# Patient Record
Sex: FEMALE | Race: WHITE | HISPANIC OR LATINO | Employment: OTHER | URBAN - METROPOLITAN AREA
[De-identification: names, ages, dates, MRNs, and addresses within clinical notes are randomized per-mention and may not be internally consistent; named-entity substitution may affect disease eponyms.]

---

## 2022-07-29 ENCOUNTER — APPOINTMENT (INPATIENT)
Dept: MRI IMAGING | Facility: HOSPITAL | Age: 77
DRG: 074 | End: 2022-07-29
Payer: MEDICARE

## 2022-07-29 ENCOUNTER — HOSPITAL ENCOUNTER (INPATIENT)
Facility: HOSPITAL | Age: 77
LOS: 2 days | Discharge: HOME | DRG: 074 | End: 2022-07-31
Attending: EMERGENCY MEDICINE | Admitting: INTERNAL MEDICINE
Payer: MEDICARE

## 2022-07-29 ENCOUNTER — APPOINTMENT (EMERGENCY)
Dept: CT IMAGING | Facility: HOSPITAL | Age: 77
DRG: 074 | End: 2022-07-29
Payer: MEDICARE

## 2022-07-29 DIAGNOSIS — I67.1 INTRACRANIAL ANEURYSM: ICD-10-CM

## 2022-07-29 DIAGNOSIS — I63.9 STROKE (HCC): Primary | ICD-10-CM

## 2022-07-29 DIAGNOSIS — I16.0 HYPERTENSIVE URGENCY: ICD-10-CM

## 2022-07-29 DIAGNOSIS — R29.898 WEAKNESS OF RIGHT HAND: ICD-10-CM

## 2022-07-29 PROBLEM — R29.90 STROKE-LIKE SYMPTOMS: Status: ACTIVE | Noted: 2022-07-29

## 2022-07-29 PROBLEM — R03.0 ELEVATED BLOOD PRESSURE READING: Status: ACTIVE | Noted: 2022-07-29

## 2022-07-29 LAB
2HR DELTA HS TROPONIN: 0 NG/L
4HR DELTA HS TROPONIN: 2 NG/L
ANION GAP SERPL CALCULATED.3IONS-SCNC: 7 MMOL/L (ref 4–13)
APTT PPP: 26 SECONDS (ref 23–37)
BUN SERPL-MCNC: 18 MG/DL (ref 5–25)
CALCIUM SERPL-MCNC: 8.4 MG/DL (ref 8.4–10.2)
CARDIAC TROPONIN I PNL SERPL HS: 4 NG/L
CARDIAC TROPONIN I PNL SERPL HS: 4 NG/L
CARDIAC TROPONIN I PNL SERPL HS: 6 NG/L
CHLORIDE SERPL-SCNC: 105 MMOL/L (ref 96–108)
CO2 SERPL-SCNC: 29 MMOL/L (ref 21–32)
CREAT SERPL-MCNC: 0.94 MG/DL (ref 0.6–1.3)
ERYTHROCYTE [DISTWIDTH] IN BLOOD BY AUTOMATED COUNT: 16.4 % (ref 11.6–15.1)
GFR SERPL CREATININE-BSD FRML MDRD: 59 ML/MIN/1.73SQ M
GLUCOSE SERPL-MCNC: 115 MG/DL (ref 65–140)
GLUCOSE SERPL-MCNC: 87 MG/DL (ref 65–140)
HCT VFR BLD AUTO: 37.3 % (ref 34.8–46.1)
HGB BLD-MCNC: 11.8 G/DL (ref 11.5–15.4)
INR PPP: 0.92 (ref 0.84–1.19)
MCH RBC QN AUTO: 25 PG (ref 26.8–34.3)
MCHC RBC AUTO-ENTMCNC: 31.6 G/DL (ref 31.4–37.4)
MCV RBC AUTO: 79 FL (ref 82–98)
PLATELET # BLD AUTO: 341 THOUSANDS/UL (ref 149–390)
PLATELET # BLD AUTO: 360 THOUSANDS/UL (ref 149–390)
PMV BLD AUTO: 10 FL (ref 8.9–12.7)
PMV BLD AUTO: 10.3 FL (ref 8.9–12.7)
POTASSIUM SERPL-SCNC: 4.4 MMOL/L (ref 3.5–5.3)
PROTHROMBIN TIME: 12.6 SECONDS (ref 11.6–14.5)
RBC # BLD AUTO: 4.72 MILLION/UL (ref 3.81–5.12)
SODIUM SERPL-SCNC: 141 MMOL/L (ref 135–147)
WBC # BLD AUTO: 7.09 THOUSAND/UL (ref 4.31–10.16)

## 2022-07-29 PROCEDURE — 99285 EMERGENCY DEPT VISIT HI MDM: CPT

## 2022-07-29 PROCEDURE — 85027 COMPLETE CBC AUTOMATED: CPT

## 2022-07-29 PROCEDURE — 82948 REAGENT STRIP/BLOOD GLUCOSE: CPT

## 2022-07-29 PROCEDURE — 99223 1ST HOSP IP/OBS HIGH 75: CPT | Performed by: INTERNAL MEDICINE

## 2022-07-29 PROCEDURE — G1004 CDSM NDSC: HCPCS

## 2022-07-29 PROCEDURE — 70496 CT ANGIOGRAPHY HEAD: CPT

## 2022-07-29 PROCEDURE — 80048 BASIC METABOLIC PNL TOTAL CA: CPT

## 2022-07-29 PROCEDURE — 85049 AUTOMATED PLATELET COUNT: CPT | Performed by: INTERNAL MEDICINE

## 2022-07-29 PROCEDURE — 70498 CT ANGIOGRAPHY NECK: CPT

## 2022-07-29 PROCEDURE — 70551 MRI BRAIN STEM W/O DYE: CPT

## 2022-07-29 PROCEDURE — 93005 ELECTROCARDIOGRAM TRACING: CPT

## 2022-07-29 PROCEDURE — 36415 COLL VENOUS BLD VENIPUNCTURE: CPT

## 2022-07-29 PROCEDURE — 84484 ASSAY OF TROPONIN QUANT: CPT | Performed by: INTERNAL MEDICINE

## 2022-07-29 PROCEDURE — 84484 ASSAY OF TROPONIN QUANT: CPT

## 2022-07-29 PROCEDURE — 99285 EMERGENCY DEPT VISIT HI MDM: CPT | Performed by: EMERGENCY MEDICINE

## 2022-07-29 PROCEDURE — 85730 THROMBOPLASTIN TIME PARTIAL: CPT

## 2022-07-29 PROCEDURE — 85610 PROTHROMBIN TIME: CPT

## 2022-07-29 RX ORDER — CLOPIDOGREL BISULFATE 75 MG/1
300 TABLET ORAL ONCE
Status: COMPLETED | OUTPATIENT
Start: 2022-07-29 | End: 2022-07-29

## 2022-07-29 RX ORDER — ATORVASTATIN CALCIUM 40 MG/1
40 TABLET, FILM COATED ORAL EVERY EVENING
Status: DISCONTINUED | OUTPATIENT
Start: 2022-07-29 | End: 2022-07-30

## 2022-07-29 RX ORDER — HEPARIN SODIUM 5000 [USP'U]/ML
5000 INJECTION, SOLUTION INTRAVENOUS; SUBCUTANEOUS EVERY 8 HOURS SCHEDULED
Status: DISCONTINUED | OUTPATIENT
Start: 2022-07-29 | End: 2022-07-31 | Stop reason: HOSPADM

## 2022-07-29 RX ORDER — CLOPIDOGREL BISULFATE 75 MG/1
75 TABLET ORAL DAILY
Status: DISCONTINUED | OUTPATIENT
Start: 2022-07-30 | End: 2022-07-31 | Stop reason: HOSPADM

## 2022-07-29 RX ORDER — ASPIRIN 325 MG
325 TABLET ORAL ONCE
Status: COMPLETED | OUTPATIENT
Start: 2022-07-29 | End: 2022-07-29

## 2022-07-29 RX ADMIN — IOHEXOL 85 ML: 350 INJECTION, SOLUTION INTRAVENOUS at 17:17

## 2022-07-29 RX ADMIN — HEPARIN SODIUM 5000 UNITS: 5000 INJECTION INTRAVENOUS; SUBCUTANEOUS at 21:49

## 2022-07-29 RX ADMIN — ASPIRIN 325 MG ORAL TABLET 325 MG: 325 PILL ORAL at 17:46

## 2022-07-29 RX ADMIN — ATORVASTATIN CALCIUM 40 MG: 40 TABLET, FILM COATED ORAL at 21:49

## 2022-07-29 RX ADMIN — CLOPIDOGREL BISULFATE 300 MG: 75 TABLET ORAL at 17:46

## 2022-07-29 NOTE — ASSESSMENT & PLAN NOTE
Patient presented with right-sided numbness, weakness, gait imbalance  CT head negative for any acute infarction or bleeding  CTA head and neck negative for any large vessel occlusion, showed a 4 mm ICA aneurysm  Patient was stroke alert  Case discussed with Neurology  Patient be admitted for stroke pathway  MRI brain, echocardiogram, tele monitoring  Patient loaded with aspirin Plavix per Neurology recommendations  Continue aspirin 81 mg, Plavix 75 mg and statin  PT OT evaluation

## 2022-07-29 NOTE — ASSESSMENT & PLAN NOTE
4 mm right ICA aneurysm seen on CT head and neck  New for patient  Neurology recommending Neurosurgery evaluation  Will consult

## 2022-07-29 NOTE — ED PROVIDER NOTES
History  Chief Complaint   Patient presents with    CVA/TIA-like Symptoms     Pt presents to ED due to c/o tongue numbness, dizziness, sensation changes and gait dysfunction  Hx stroke, similar symptoms  Patient is a 82-year-old female with a history of hyperlipidemia who presents with gait disturbance and numbness  Patient was last well around 8:30 p m  Last evening  Her daughter states that patient lives with her and patient was normal prior to going to bed  When patient woke up this morning around 5:00 a m , she began complaining of unsteady gait  This continued throughout the day and patient's then told the daughter that she was having sensory deficits on the right side  She described numbness on the right face, upper extremity and lower extremity  She also complained of numbness of her tongue  Daughter did not note any speech difficulty  Patient does feel that she is leaning to the right when she is ambulating  She does have a history of TIA  No history of hypertension according to patient and daughter  History provided by:  Patient  Neurologic Problem  Quality:  Gait disturbance and numbness  Timing:  Constant  Progression:  Unchanged  Chronicity:  New  Ineffective treatments:  None tried  Associated symptoms: no abdominal pain, no chest pain, no cough, no diarrhea, no fever, no headaches, no nausea, no rash, no shortness of breath, no sore throat and no vomiting        None       No past medical history on file  No past surgical history on file  No family history on file  I have reviewed and agree with the history as documented  No existing history information found  No existing history information found  Review of Systems   Constitutional: Negative for chills, diaphoresis and fever  HENT: Negative for nosebleeds, sore throat and trouble swallowing  Eyes: Negative for photophobia, pain and visual disturbance     Respiratory: Negative for cough, chest tightness and shortness of breath  Cardiovascular: Negative for chest pain, palpitations and leg swelling  Gastrointestinal: Negative for abdominal pain, constipation, diarrhea, nausea and vomiting  Endocrine: Negative for polydipsia and polyuria  Genitourinary: Negative for difficulty urinating, dysuria, hematuria, pelvic pain, vaginal bleeding and vaginal discharge  Musculoskeletal: Positive for gait problem  Negative for back pain, neck pain and neck stiffness  Skin: Negative for pallor and rash  Neurological: Positive for numbness  Negative for dizziness, seizures, light-headedness and headaches  All other systems reviewed and are negative  Physical Exam  Physical Exam  Vitals and nursing note reviewed  Constitutional:       General: She is not in acute distress  Appearance: She is well-developed  HENT:      Head: Normocephalic and atraumatic  Eyes:      Pupils: Pupils are equal, round, and reactive to light  Cardiovascular:      Rate and Rhythm: Normal rate and regular rhythm  Pulses: Normal pulses  Heart sounds: Normal heart sounds  Pulmonary:      Effort: Pulmonary effort is normal  No respiratory distress  Breath sounds: Normal breath sounds  Abdominal:      General: There is no distension  Palpations: Abdomen is soft  Abdomen is not rigid  Tenderness: There is no abdominal tenderness  There is no guarding or rebound  Musculoskeletal:         General: No tenderness  Normal range of motion  Cervical back: Normal range of motion and neck supple  Lymphadenopathy:      Cervical: No cervical adenopathy  Skin:     General: Skin is warm and dry  Capillary Refill: Capillary refill takes less than 2 seconds  Neurological:      Mental Status: She is alert and oriented to person, place, and time  GCS: GCS eye subscore is 4  GCS verbal subscore is 5  GCS motor subscore is 6  Cranial Nerves: No cranial nerve deficit        Sensory: Sensory deficit (Decreased sensation to light touch in the right face, upper and lower extremities) present  Motor: Weakness (4+/5 strength in the right lower extremity  , 5/5 strength in all other muscle groups) present  Coordination: Coordination is intact  Vital Signs  ED Triage Vitals   Temperature Pulse Respirations Blood Pressure SpO2   07/29/22 1724 07/29/22 1653 07/29/22 1653 07/29/22 1653 07/29/22 1653   98 2 °F (36 8 °C) 81 16 (!) 237/200 99 %      Temp Source Heart Rate Source Patient Position - Orthostatic VS BP Location FiO2 (%)   07/29/22 1724 07/29/22 1653 07/29/22 1653 07/29/22 1653 --   Oral Monitor Sitting Left arm       Pain Score       --                  Vitals:    07/29/22 1653 07/29/22 1705 07/29/22 1715   BP: (!) 237/200 (!) 186/87 (!) 178/85   Pulse: 81 80 80   Patient Position - Orthostatic VS: Sitting           Visual Acuity  Visual Acuity    Flowsheet Row Most Recent Value   L Pupil Size (mm) 2   R Pupil Size (mm) 3          ED Medications  Medications   iohexol (OMNIPAQUE) 350 MG/ML injection (SINGLE-DOSE) 85 mL (85 mL Intravenous Given 7/29/22 1717)       Diagnostic Studies  Results Reviewed     Procedure Component Value Units Date/Time    CBC and Platelet [[id number]]  (Abnormal) Collected: 07/29/22 1711    Lab Status: Final result Specimen: Blood from Arm, Right Updated: 07/29/22 1725     WBC 7 09 Thousand/uL      RBC 4 72 Million/uL      Hemoglobin 11 8 g/dL      Hematocrit 37 3 %      MCV 79 fL      MCH 25 0 pg      MCHC 31 6 g/dL      RDW 16 4 %      Platelets 522 Thousands/uL      MPV 10 3 fL     Protime-INR [[id number]] Collected: 07/29/22 1711    Lab Status: In process Specimen: Blood from Arm, Right Updated: 07/29/22 1716    APTT [[id number]] Collected: 07/29/22 1711    Lab Status: In process Specimen: Blood from Arm, Right Updated: 07/29/22 1716    HS Troponin 0hr (reflex protocol) [[id number]] Collected: 07/29/22 1711    Lab Status:  In process Specimen: Blood from Arm, Right Updated: 07/29/22 5777    Basic metabolic panel [[id number]] Collected: 07/29/22 1711    Lab Status: In process Specimen: Blood from Arm, Right Updated: 07/29/22 1716    Fingerstick Glucose (POCT) [[id number]]  (Normal) Collected: 07/29/22 1702    Lab Status: Final result Updated: 07/29/22 1703     POC Glucose 115 mg/dl                  CT stroke alert brain    (Results Pending)   CTA stroke alert (head/neck)    (Results Pending)              Procedures  ECG 12 Lead Documentation Only    Date/Time: 7/29/2022 5:28 PM  Performed by: Guillermo Minor DO  Authorized by: Guillermo Minor DO     ECG reviewed by me, the ED Provider: yes    Patient location:  ED  Previous ECG:     Previous ECG:  Compared to current    Similarity:  No change    Comparison to cardiac monitor: Yes    Comments:      Normal sinus rhythm at a rate of 76 beats per minute  Normal intervals  Normal axis  Poor R-wave progression  Nonspecific ST T wave abnormalities  Similar to previous from 03/08/2015             ED Course  ED Course as of 07/29/22 1857 Fri Jul 29, 2022   1711 Spoke with Neurology, Dr Mercedes Oates, who will look at the images  If negative, will load with aspirin and Plavix  1743 No hemorrhage on CT  Will load with aspirin and Plavix per neurology recommendations                                             MDM  Number of Diagnoses or Management Options  Stroke [facility]): new and requires workup  Diagnosis management comments: Patient presents with unsteady gait and paresthesias  Symptoms concerning for stroke  Last known well is outside of the window for tPA  CTA does not reveal a thrombosis or other pathology amenable to endovascular intervention  Neurology recommends aspirin and Plavix  Will hospitalize for further workup of stroke      Portions of the above record have been created with voice recognition software   Occasional wrong word or "sound alike" substitutions may have occurred due to the inherent limitations of voice recognition software   Read the chart carefully and recognize, using context, where substitutions may have occurred  Amount and/or Complexity of Data Reviewed  Clinical lab tests: ordered and reviewed  Tests in the radiology section of CPT®: ordered and reviewed  Tests in the medicine section of CPT®: ordered and reviewed  Review and summarize past medical records: yes  Discuss the patient with other providers: yes  Independent visualization of images, tracings, or specimens: yes    Risk of Complications, Morbidity, and/or Mortality  Presenting problems: high  Diagnostic procedures: high  Management options: moderate    Patient Progress  Patient progress: stable      Disposition  Final diagnoses:   Stroke [facility])     Time reflects when diagnosis was documented in both MDM as applicable and the Disposition within this note     Time User Action Codes Description Comment    7/29/2022  5:09 PM Angela Murphy Add [I63 9] Stroke [facility])       ED Disposition     None      Follow-up Information    None         Patient's Medications    No medications on file       No discharge procedures on file      PDMP Review     None          ED Provider  Electronically Signed by           Rosalinda Cantrell DO  07/29/22 7536

## 2022-07-29 NOTE — QUICK NOTE
Stroke alert 5:05 pm  Neurology call back 5:05 pm  NIH 1, rt sided numbness  Pt out of iv tpa window  Last known normal last night  Ct head no hemorrhage or early evidence of infarct  cta head/neck no lvo  Pt has bilateral intracranial stenosis    htn urgency vs acute left thalamic cva  Given intracranial rt suprclinoid 4 mm aneurysm would recommend tighter bp control sbp 160-180   Initially bp 237/200 don't want to drop it too fast     Aspirin 300 mg load, plavix 325 mg load then 81 mg and plavix 75 mg daily for 3 weeks then aspirin only 81 mg daily  lipitor 80 mg daily  Tele  2-d echo  Mri brain w/o contrast  Stat head ct with any neuro change  Flp, tsh, hba1c, b12  neurosurgery consult for aneurysm

## 2022-07-29 NOTE — H&P
Eduardo 60 1945, 78 y o  female MRN: [id number]  Unit/Bed#: ED 07 Encounter: [id number]  Primary Care Provider: No primary care provider on file  Date and time admitted to hospital: 7/29/2022  4:50 PM    * Stroke-like symptoms  Assessment & Plan  Patient presented with right-sided numbness, weakness, gait imbalance  CT head negative for any acute infarction or bleeding  CTA head and neck negative for any large vessel occlusion, showed a 4 mm ICA aneurysm  Patient was stroke alert  Case discussed with Neurology  Patient be admitted for stroke pathway  MRI brain, echocardiogram, tele monitoring  Patient loaded with aspirin Plavix per Neurology recommendations  Continue aspirin 81 mg, Plavix 75 mg and statin  PT OT evaluation  Intracranial aneurysm  Assessment & Plan  4 mm right ICA aneurysm seen on CT head and neck  New for patient  Neurology recommending Neurosurgery evaluation  Will consult  Elevated blood pressure reading  Assessment & Plan  Patient blood pressure is significantly elevated on admission  Patient without any known history of hypertension  Blood pressure now significantly improved without any interventions  Continue monitor closely for now  VTE Prophylaxis: Heparin  / sequential compression device   Code Status: full  POLST: POLST form is not discussed and not completed at this time  Discussion with family: pt and daughter    Anticipated Length of Stay:  Patient will be admitted on an Inpatient basis with an anticipated length of stay of  > 2 midnights  Justification for Hospital Stay: above    Total Time for Visit, including Counseling / Coordination of Care: 60 minutes  Greater than 50% of this total time spent on direct patient counseling and coordination of care      Chief Complaint:   Right upper and lower extremity numbness, weakness, gait imbalance x since this morning    History of Present Illness:    Travis Medrano is a 78 y o  female who presents with Right upper and lower extremity numbness, weakness, gait imbalance x since this morning  Patient is primarily Indonesian-speaking  Accompanied by her daughter at bedside who helped with translation  Per daughter, patient mentioned to them about gait imbalance and the numbness this afternoon when they were at the mall  But per patient her symptoms possibly started around this morning and progressively worsened  She also complained of numbness on the right side of her tongue  Currently on my evaluation, patient reports slight improvement in her symptoms  But still feels weak, numb on the right extremities  Had headache previously that has now resolved  Daughter does report history of TIA  No other known medical problems  She is not on any chronic prescription medications  Not even aspirin  Review of Systems:    Review of Systems   Constitutional: Negative for chills, fatigue and fever  HENT: Negative for congestion and sore throat  Respiratory: Negative for cough and shortness of breath  Cardiovascular: Negative for chest pain and palpitations  Gastrointestinal: Negative for abdominal pain, nausea and vomiting  Genitourinary: Negative for difficulty urinating, dysuria, flank pain, frequency and urgency  Musculoskeletal: Positive for gait problem  Negative for arthralgias and myalgias  Skin: Negative for color change and rash  Neurological: Positive for weakness, numbness and headaches  Negative for dizziness, speech difficulty and light-headedness  Psychiatric/Behavioral: Negative for agitation, behavioral problems and confusion  Past Medical and Surgical History:     No past medical history on file  No past surgical history on file  Meds/Allergies:    Prior to Admission medications    Not on File     I have reviewed home medications with patient family member      Allergies: No Known Allergies    Social History:     Marital Status: Single   Occupation:   Patient Pre-hospital Living Situation:   Patient Pre-hospital Level of Mobility:   Patient Pre-hospital Diet Restrictions:   Substance Use History:   Social History     Substance and Sexual Activity   Alcohol Use Not on file     Social History     Tobacco Use   Smoking Status Not on file   Smokeless Tobacco Not on file     Social History     Substance and Sexual Activity   Drug Use Not on file       Family History:    No family history on file  Physical Exam:     Vitals:   Blood Pressure: 155/72 (07/29/22 1845)  Pulse: 76 (07/29/22 1845)  Temperature: 98 2 °F (36 8 °C) (07/29/22 1724)  Temp Source: Oral (07/29/22 1724)  Respirations: 18 (07/29/22 1845)  Weight - Scale: 80 6 kg (177 lb 11 1 oz) (07/29/22 1715)  SpO2: 97 % (07/29/22 1845)    Physical Exam    Constitutional: Pt appears comfortable  Not in any acute distress  HENT:   Head: Normocephalic and atraumatic  Eyes: EOM are normal    Neck: Neck supple  Cardiovascular: Normal rate, regular rhythm, normal heart sounds  No murmur heard  Pulmonary/Chest: Effort normal, air entry b/l equal  No respiratory distress  Pt has no wheezes or crackles  Abdominal: Soft  Non-distended, Non-tender  Bowel sounds are normal    Musculoskeletal: Normal range of motion  Neurological: awake, alert  Moving all extremities spontaneously  Right upper and lower extremity strength 5-/5  Numbness in right upper and lower extremity  Cranial nerves intact  Psychiatric: normal mood and affect  Additional Data:     Lab Results: I have personally reviewed pertinent reports        Results from last 7 days   Lab Units 07/29/22  1711   WBC Thousand/uL 7 09   HEMOGLOBIN g/dL 11 8   HEMATOCRIT % 37 3   PLATELETS Thousands/uL 360     Results from last 7 days   Lab Units 07/29/22  1711   SODIUM mmol/L 141   POTASSIUM mmol/L 4 4   CHLORIDE mmol/L 105   CO2 mmol/L 29   BUN mg/dL 18   CREATININE mg/dL 0 94   ANION GAP mmol/L 7 CALCIUM mg/dL 8 4   GLUCOSE RANDOM mg/dL 87     Results from last 7 days   Lab Units 07/29/22  1711   INR  0 92     Results from last 7 days   Lab Units 07/29/22  1702   POC GLUCOSE mg/dl 115               Imaging: I have personally reviewed pertinent reports  CTA stroke alert (head/neck)   Final Result by Toya Petersen MD (07/29 6104)      Moderate to severe plaque stenosis bilateral cavernous ICA carotid siphon  4 mm right supraclinoid ICA aneurysm  I personally discussed this study with neurologist  on 7/29/2022 at 5:28 PM                         Workstation performed: [id number]         CT stroke alert brain   Final Result by Toya Petersen MD (07/29 1726)      No acute intracranial abnormality  I personally discussed this study with neurologist  on 7/29/2022 at 5:28 PM                Workstation performed: [id number]         MRI Inpatient Order    (Results Pending)       EKG, Pathology, and Other Studies Reviewed on Admission:   · EKG:     Allscripts / Epic Records Reviewed: Yes     ** Please Note: This note has been constructed using a voice recognition system   **

## 2022-07-29 NOTE — ASSESSMENT & PLAN NOTE
Patient blood pressure is significantly elevated on admission  Patient without any known history of hypertension  Blood pressure now significantly improved without any interventions  Continue monitor closely for now

## 2022-07-30 ENCOUNTER — APPOINTMENT (INPATIENT)
Dept: NON INVASIVE DIAGNOSTICS | Facility: HOSPITAL | Age: 77
DRG: 074 | End: 2022-07-30
Payer: MEDICARE

## 2022-07-30 PROBLEM — E78.5 HYPERLIPIDEMIA: Status: ACTIVE | Noted: 2022-07-30

## 2022-07-30 PROBLEM — I16.0 HYPERTENSIVE URGENCY: Status: ACTIVE | Noted: 2022-07-29

## 2022-07-30 PROBLEM — R29.898 WEAKNESS OF RIGHT HAND: Status: ACTIVE | Noted: 2022-07-29

## 2022-07-30 LAB
ALBUMIN SERPL BCP-MCNC: 3.4 G/DL (ref 3.5–5)
ALP SERPL-CCNC: 69 U/L (ref 34–104)
ALT SERPL W P-5'-P-CCNC: 11 U/L (ref 7–52)
ANION GAP SERPL CALCULATED.3IONS-SCNC: 5 MMOL/L (ref 4–13)
AST SERPL W P-5'-P-CCNC: 17 U/L (ref 13–39)
ATRIAL RATE: 79 BPM
BILIRUB SERPL-MCNC: 0.44 MG/DL (ref 0.2–1)
BUN SERPL-MCNC: 13 MG/DL (ref 5–25)
CALCIUM ALBUM COR SERPL-MCNC: 8.4 MG/DL (ref 8.3–10.1)
CALCIUM SERPL-MCNC: 7.9 MG/DL (ref 8.4–10.2)
CHLORIDE SERPL-SCNC: 108 MMOL/L (ref 96–108)
CHOLEST SERPL-MCNC: 188 MG/DL
CHOLEST SERPL-MCNC: 188 MG/DL
CO2 SERPL-SCNC: 27 MMOL/L (ref 21–32)
CREAT SERPL-MCNC: 0.68 MG/DL (ref 0.6–1.3)
ERYTHROCYTE [DISTWIDTH] IN BLOOD BY AUTOMATED COUNT: 16.2 % (ref 11.6–15.1)
EST. AVERAGE GLUCOSE BLD GHB EST-MCNC: 114 MG/DL
GFR SERPL CREATININE-BSD FRML MDRD: 85 ML/MIN/1.73SQ M
GLUCOSE SERPL-MCNC: 84 MG/DL (ref 65–140)
HBA1C MFR BLD: 5.6 %
HCT VFR BLD AUTO: 35.2 % (ref 34.8–46.1)
HDLC SERPL-MCNC: 49 MG/DL
HDLC SERPL-MCNC: 49 MG/DL
HGB BLD-MCNC: 11.3 G/DL (ref 11.5–15.4)
LDLC SERPL CALC-MCNC: 124 MG/DL (ref 0–100)
LDLC SERPL CALC-MCNC: 124 MG/DL (ref 0–100)
MCH RBC QN AUTO: 24.9 PG (ref 26.8–34.3)
MCHC RBC AUTO-ENTMCNC: 32.1 G/DL (ref 31.4–37.4)
MCV RBC AUTO: 78 FL (ref 82–98)
NONHDLC SERPL-MCNC: 139 MG/DL
P AXIS: 51 DEGREES
PLATELET # BLD AUTO: 322 THOUSANDS/UL (ref 149–390)
PMV BLD AUTO: 9.8 FL (ref 8.9–12.7)
POTASSIUM SERPL-SCNC: 3.7 MMOL/L (ref 3.5–5.3)
PR INTERVAL: 152 MS
PROT SERPL-MCNC: 6.1 G/DL (ref 6.4–8.4)
QRS AXIS: 66 DEGREES
QRSD INTERVAL: 68 MS
QT INTERVAL: 360 MS
QTC INTERVAL: 405 MS
RBC # BLD AUTO: 4.53 MILLION/UL (ref 3.81–5.12)
SODIUM SERPL-SCNC: 140 MMOL/L (ref 135–147)
T WAVE AXIS: 56 DEGREES
TRIGL SERPL-MCNC: 74 MG/DL
TRIGL SERPL-MCNC: 74 MG/DL
TSH SERPL DL<=0.05 MIU/L-ACNC: 4.25 UIU/ML (ref 0.45–4.5)
VENTRICULAR RATE: 76 BPM
VIT B12 SERPL-MCNC: 2773 PG/ML (ref 100–900)
WBC # BLD AUTO: 5.34 THOUSAND/UL (ref 4.31–10.16)

## 2022-07-30 PROCEDURE — 97116 GAIT TRAINING THERAPY: CPT

## 2022-07-30 PROCEDURE — 80053 COMPREHEN METABOLIC PANEL: CPT | Performed by: INTERNAL MEDICINE

## 2022-07-30 PROCEDURE — 93306 TTE W/DOPPLER COMPLETE: CPT

## 2022-07-30 PROCEDURE — 97163 PT EVAL HIGH COMPLEX 45 MIN: CPT

## 2022-07-30 PROCEDURE — 80061 LIPID PANEL: CPT

## 2022-07-30 PROCEDURE — 80061 LIPID PANEL: CPT | Performed by: INTERNAL MEDICINE

## 2022-07-30 PROCEDURE — 99222 1ST HOSP IP/OBS MODERATE 55: CPT | Performed by: PSYCHIATRY & NEUROLOGY

## 2022-07-30 PROCEDURE — 93010 ELECTROCARDIOGRAM REPORT: CPT | Performed by: INTERNAL MEDICINE

## 2022-07-30 PROCEDURE — 84443 ASSAY THYROID STIM HORMONE: CPT

## 2022-07-30 PROCEDURE — 92610 EVALUATE SWALLOWING FUNCTION: CPT

## 2022-07-30 PROCEDURE — 97166 OT EVAL MOD COMPLEX 45 MIN: CPT

## 2022-07-30 PROCEDURE — NC001 PR NO CHARGE: Performed by: NURSE PRACTITIONER

## 2022-07-30 PROCEDURE — 82607 VITAMIN B-12: CPT

## 2022-07-30 PROCEDURE — 83036 HEMOGLOBIN GLYCOSYLATED A1C: CPT | Performed by: INTERNAL MEDICINE

## 2022-07-30 PROCEDURE — 99447 NTRPROF PH1/NTRNET/EHR 11-20: CPT | Performed by: NURSE PRACTITIONER

## 2022-07-30 PROCEDURE — 85027 COMPLETE CBC AUTOMATED: CPT | Performed by: INTERNAL MEDICINE

## 2022-07-30 PROCEDURE — 99233 SBSQ HOSP IP/OBS HIGH 50: CPT | Performed by: INTERNAL MEDICINE

## 2022-07-30 RX ORDER — ATORVASTATIN CALCIUM 40 MG/1
80 TABLET, FILM COATED ORAL EVERY EVENING
Status: DISCONTINUED | OUTPATIENT
Start: 2022-07-30 | End: 2022-07-31 | Stop reason: HOSPADM

## 2022-07-30 RX ORDER — ASPIRIN 81 MG/1
81 TABLET ORAL DAILY
Status: DISCONTINUED | OUTPATIENT
Start: 2022-07-30 | End: 2022-07-31 | Stop reason: HOSPADM

## 2022-07-30 RX ADMIN — HEPARIN SODIUM 5000 UNITS: 5000 INJECTION INTRAVENOUS; SUBCUTANEOUS at 14:13

## 2022-07-30 RX ADMIN — HEPARIN SODIUM 5000 UNITS: 5000 INJECTION INTRAVENOUS; SUBCUTANEOUS at 05:33

## 2022-07-30 RX ADMIN — ASPIRIN 81 MG: 81 TABLET, COATED ORAL at 08:42

## 2022-07-30 RX ADMIN — HEPARIN SODIUM 5000 UNITS: 5000 INJECTION INTRAVENOUS; SUBCUTANEOUS at 20:39

## 2022-07-30 RX ADMIN — CLOPIDOGREL BISULFATE 75 MG: 75 TABLET ORAL at 08:42

## 2022-07-30 RX ADMIN — DESMOPRESSIN ACETATE 80 MG: 0.2 TABLET ORAL at 18:16

## 2022-07-30 NOTE — CONSULTS
Consultation - Neurology   Kamilah Doshi 78 y o  female MRN: [id number]  Unit/Bed#: S -01 Encounter: [id number]      Assessment/Plan     * Weakness of right hand  Assessment & Plan   78 y o  right handed female with intracranial aneurysm, elevated BP, who presented to [facility] on 7/29/22 with R sided numbness  NIHSS 1 (sensation)  BP on arrival 163/76 but then 237/200  Last known well night of 7/28/22 around 8:30PM   Pt not a tPA candidate due to being outside of tPA time window  R thumb adduction weakness and decreased strength concerning for neuropathy in R hand versus arthritis (pain limitation)     Plan   - recommend outpatient RUE nerve conduction study    MRI brain without any evidence of acute infarct   Echo pending    Lipid Panel: total cholesterol 152   Hemoglobin A1c pending    TSH 4 246   Vitamin B12 pending    S/p aspirin 325 mg load and Plavix 300 mg load on 7/29/22   May resume antiplatelet regimen as was prior to admission due to no stroke, no TIA    Defer statin recommendations to primary team    Recommend goal normotension, management per primary team    Euglycemic, normothermic goal   PT/OT/ST eval and treat as appropriate   Frequent neuro checks  Continue to monitor and notify neurology with any changes   STAT CT head for any acute change in neuro exam  - Medical management and supportive care per primary team  Correction of any metabolic or infectious disturbances    - No further inpatient neurology recommendations at this time  Please reach out with any further questions or concerns  Intracranial aneurysm  Assessment & Plan  - recommend neurosurgical evaluation     Elevated blood pressure reading  Assessment & Plan  - recommend goal normotension   - management per primary team         Kamilah Doshi will need follow up in in 6 weeks with general attending or advance practitioner  She will require a EMG/NCS within 12 weeks       **History and physical examination obtained by attending neurologist  AP in room as witness to history and physical examination obtained and acted solely as a scribe for attending neurologist  This AP did not provide any decision making for this encounter  **      History of Present Illness     Reason for Consult / Principal Problem: Numbness   Hx and PE limited by: N/A; patient's daughter in room and able to supplement history and translate   HPI: Cortney Sterling is a 78 y o  right handed female with intracranial aneurysm, elevated BP, who presented to [facility] on 7/29/22 with R sided numbness  NIHSS 1 (sensation)  BP on arrival 163/76 but then 237/200  Last known well night of 7/28/22 around 8:30PM   Pt not a tPA candidate due to being outside of tPA time window  Per ED notes, pt's daughter reports pt woke up at 5 AM 7/29/22 and was complaining of unsteady gait, which continued throughout the day  Then, pt complained of R sided sensory deficits per ED notes (numbness in R face, RUE, RLE, tongue)  Today, pt notes some lower abdominal pain  Patient reports the lower abdominal pain started "after they injected me " Patient reports she gets up 5-7 times to urinate in the night  Pt's daughter reports she has been dropping things with her R hand and is not able to open and close her R hand as well as her L hand  Pt reports some itchiness and discomfort in R ear  Inpatient consult to Neurology  Consult performed by: Jennifer Junior PA-C  Consult ordered by: Zion Richard MD    Consult to Neurology  Consult performed by: Jennifer Junior PA-C  Consult ordered by: Zion Richard MD          Review of Systems   Constitutional: Negative for fever  HENT: Positive for ear pain  Gastrointestinal: Positive for abdominal pain  Negative for nausea  Genitourinary: Positive for frequency  Musculoskeletal: Positive for neck pain (some chronic "here and there" )     Neurological: Negative for facial asymmetry and speech difficulty  Historical Information   History reviewed  No pertinent past medical history  History reviewed  No pertinent surgical history  Social History   Social History     Substance and Sexual Activity   Alcohol Use None     Social History     Substance and Sexual Activity   Drug Use Not on file     E-Cigarette/Vaping     E-Cigarette/Vaping Substances     Social History     Tobacco Use   Smoking Status Not on file   Smokeless Tobacco Not on file     Family History: History reviewed  No pertinent family history  Review of previous medical records was  completed  Meds/Allergies   current meds:   Current Facility-Administered Medications   Medication Dose Route Frequency    aspirin (ECOTRIN LOW STRENGTH) EC tablet 81 mg  81 mg Oral Daily    atorvastatin (LIPITOR) tablet 80 mg  80 mg Oral QPM    clopidogrel (PLAVIX) tablet 75 mg  75 mg Oral Daily    heparin (porcine) subcutaneous injection 5,000 Units  5,000 Units Subcutaneous Q8H [address]    and PTA meds:   None       No Known Allergies    Objective   Vitals:Blood pressure (!) 177/97, pulse 80, temperature 97 5 °F (36 4 °C), resp  rate 18, weight 80 6 kg (177 lb 11 1 oz), SpO2 98 %  ,There is no height or weight on file to calculate BMI  No intake or output data in the 24 hours ending 07/30/22 1147    Invasive Devices: Invasive Devices  Report    Peripheral Intravenous Line  Duration           Peripheral IV 07/29/22 Right Antecubital <1 day                Physical Exam  Vitals and nursing note reviewed  Constitutional:       General: She is not in acute distress  Appearance: She is obese  She is not diaphoretic  HENT:      Head: Normocephalic and atraumatic  Nose: Nose normal  No congestion or rhinorrhea        Mouth/Throat:      Mouth: Mucous membranes are moist    Eyes:      Extraocular Movements: Extraocular movements intact and EOM normal       Conjunctiva/sclera: Conjunctivae normal       Pupils: Pupils are equal, round, and reactive to light  Cardiovascular:      Rate and Rhythm: Normal rate  Pulmonary:      Effort: Pulmonary effort is normal    Musculoskeletal:      Right lower leg: No edema  Left lower leg: No edema  Neurological:      Mental Status: She is alert  Coordination: Finger-Nose-Finger Test and Heel to [employer] normal    Psychiatric:      Comments: Pleasant and cooperative during exam         Neurologic Exam     Mental Status   Follows 1 step commands  Attention: appropriately attends to provider  Concentration: no redirection required during exam    Speech: (No dysarthria on exam  Speech fluent  )  Level of consciousness: alert  - oriented to place   - able to correctly state date of birth   - oriented to month and year      Cranial Nerves     CN II   Visual acuity: (grossly intact )  Right visual field deficit: none  Left visual field deficit: none     CN III, IV, VI   Pupils are equal, round, and reactive to light  Extraocular motions are normal    Right pupil: Size: 3 mm  Left pupil: Size: 3 mm  CN V   Facial sensation intact  Right facial sensation deficit: none  Left facial sensation deficit: none    CN VII   Facial expression full, symmetric     Right facial weakness: none  Left facial weakness: none    CN VIII   Hearing impaired: reports hearing slightly louder in R compared to L     CN XI   CN XI normal    Right trapezius strength: normal  Left trapezius strength: normal    Motor Exam   Muscle bulk: normal- R thumb adduction weaker and with decreased mobility compared to L thumb adduction   - R hand  4+/5  - L hand  5/5  - b/l wrist flexion 5/5   - b/l finger flexion 5/5  - b/l finger extension 5/5   - b/l elbow flexion and extension 5/5  - b/l hip flexion 5/5  - b/l knee flexion and extension 5/5   - b/l plantar flexion and dorsi flexion 5/5       Sensory Exam   Light touch normal    Right arm light touch: normal  Left arm light touch: normal  Right leg light touch: normal  Left leg light touch: normal  - Proprioception intact at b/l great toes  - feels temperature sensation greater on RUE compared to LUE      Gait, Coordination, and Reflexes     Gait  Gait: (deferred for patient safety )    Coordination   Finger to nose coordination: normal  Heel to shin coordination: normal    Reflexes   Right plantar reflex: downgoing   Left plantar reflex: downgoing Reflexes equal in b/l biceps and b/l patellars        Lab Results:   CBC:   Results from last 7 days   Lab Units 07/30/22  0613 07/29/22  2045 07/29/22  1711   WBC Thousand/uL 5 34  --  7 09   RBC Million/uL 4 53  --  4 72   HEMOGLOBIN g/dL 11 3*  --  11 8   HEMATOCRIT % 35 2  --  37 3   MCV fL 78*  --  79*   PLATELETS Thousands/uL 322 341 360   , BMP/CMP:   Results from last 7 days   Lab Units 07/30/22  0613 07/29/22  1711   SODIUM mmol/L 140 141   POTASSIUM mmol/L 3 7 4 4   CHLORIDE mmol/L 108 105   CO2 mmol/L 27 29   BUN mg/dL 13 18   CREATININE mg/dL 0 68 0 94   CALCIUM mg/dL 7 9* 8 4   AST U/L 17  --    ALT U/L 11  --    ALK PHOS U/L 69  --    EGFR ml/min/1 73sq m 85 59   , HgBA1C:   , TSH:   Results from last 7 days   Lab Units 07/30/22  0613   TSH 3RD GENERATON uIU/mL 4 246   , Lipid Profile:   Results from last 7 days   Lab Units 07/30/22  0613   HDL mg/dL 49*  49*   LDL CALC mg/dL 124*  124*   TRIGLYCERIDES mg/dL 74  74     Imaging Studies: I have personally reviewed pertinent reports  and I have personally reviewed pertinent films in PACS  MRI brain wo contrast 7/29/22, CTA H/N wwo contrast 7/29/22, CTH wo contrast 7/29/22  EKG, Pathology, and Other Studies: EKG 7/29/22  VTE Prophylaxis: Heparin    Code Status: Level 1 - Full Code  Advance Directive and Living Will:      Power of :    POLST:      Counseling / Coordination of Care  Please see time spent in attending attestation       **History and physical examination obtained by attending neurologist  AP in room as witness to history and physical examination obtained and acted solely as a scribe for attending neurologist This AP did not provide any decision making for this encounter   **

## 2022-07-30 NOTE — PHYSICAL THERAPY NOTE
PHYSICAL THERAPY EVALUATION NOTE          Patient Name: Orville Florentino  UOQYK'M Date: 2022      AGE:   78 y o  Mrn:   [id number]  ADMIT DX:  Intracranial aneurysm [I67 1]  Stroke [facility]) [I63 9]  Symptoms of cerebrovascular accident (CVA) [R09 89]    Past Medical History:  History reviewed  No pertinent past medical history  Past Surgical History:  History reviewed  No pertinent surgical history    Length Of Stay: 1        PHYSICAL THERAPY EVALUATION:    Patient's identity confirmed via 2 patient identifiers (full name and ) at start of session       22 1320   PT Last Visit   PT Visit Date 22   Note Type   Note type Evaluation   Pain Assessment   Pain Assessment Tool 0-10   Pain Score No Pain   Restrictions/Precautions   Weight Bearing Precautions Per Order No   Other Precautions Fall Risk  (Pt preferred language is Green[place]ic)   Home Living   Type of [address] [place] [address] Two level;1/2 bath on main level;Bed/bath upstairs  (0 LORENE through garage, 4 LORENE w/o railing through front entrance)   Bathroom Shower/Tub Tub/shower unit   Bathroom Toilet Standard   Additional Comments Pt currently residing w/ family in a two level house w/ 0 LORENE through garage, 1/2 bath on 1st floor w/ couch available if needed, and full flight of steps to 2nd floor bedroom and full bathroom   Prior Function   Level of [place] Independent with ADLs and functional mobility   Lives With Daughter;Family   Receives Help From Family   ADL Assistance Independent   IADLs Needs assistance  (family assists)   Falls in the last 6 months 0   Comments At baseline, pt ambulates independently w/o AD, performs ADLs independently, and has assistance w/ IADLs   General   Additional Pertinent History Pt is primarily [place]ic speaking, declined use of  w/ family present throughout   Family/Caregiver Present Yes   Cognition   Arousal/Participation Cooperative Orientation Level Oriented to person;Oriented to time   Following Commands Follows one step commands with increased time or repetition  (visual cues provided as needed)   Comments Pt ID via name and  on wristband; pt agreeable to PT eval and mobility   Strength RLE   R Knee Flexion 4/5   R Knee Extension 3+/5   R Ankle Dorsiflexion 3+/5   R Ankle Plantar Flexion 3+/5   Strength LLE   L Knee Flexion 4/5   L Knee Extension 4/5   L Ankle Dorsiflexion 4/5   L Ankle Plantar Flexion 4/5   Vision-Basic Assessment   Current Vision Wears glasses all the time   Light Touch   RLE Light Touch Impaired   RLE Light Touch Comments pt reports decreased overall sensation of RLE compared to LLE   LLE Light Touch Grossly intact   Bed Mobility   Supine to Sit Unable to assess   Additional items   (pt sitting OOB on couch upon arrival)   Sit to Supine Unable to assess   Additional items   (pt sitting OOB at end of session)   Transfers   Sit to Stand 5  Supervision   Additional items Assist x 1; Increased time required;Verbal cues   Stand to Sit 5  Supervision   Additional items Assist x 1; Increased time required;Verbal cues   Toilet transfer 5  Supervision   Additional items Assist x 1; Increased time required;Commode   Additional Comments VC for self-pacing w/ sit>stand mobility  Pt able to maintain standing balance at bathroom sink for approx 45-60 sec w/ supervision   Ambulation/Elevation   Gait pattern Improper Weight shift; Wide KEYLA; Decreased foot clearance; Short stride  (1-2 episodes of pt reaching for external assistance to maintain balance)   Gait Assistance 5  Supervision  (1-2 epsiodes of pt reaching for additional external assistance for balance/support)   Additional items Assist x 1;Verbal cues   Assistive Device None   Distance 25'+20'   Stair Management Assistance 5  Supervision   Additional items Assist x 1;Verbal cues  (PT providing LUE support (acting as railing))   Stair Management Technique Step to pattern   Number of Stairs 2  (declined further step trials)   Balance   Static Sitting Fair +   Dynamic Sitting Fair   Static Standing Fair -   Dynamic Standing [address] -   Activity Tolerance   Activity Tolerance Patient tolerated treatment well   Medical Staff Ismael velazquez w/ OT Rebecca Rivas due to pt's medical complexity, regression from mobility baseline, and cognitive/safety awareness deficits requiring two skilled clinicians; individual items assessed and goals addressed   Assessment   Prognosis Fair   Problem List Decreased strength; Impaired balance;Decreased mobility; Impaired sensation   Assessment Ronald Bhakta is a 78 y o  Female who [facility]20[facility] w/ c/o tongue numbness, dizziness, sensation changes, and gait dysfunction and diagnosis of weakness of R hand  Orders for PT eval and treat received, w/ activity orders of up w/ assist  Comorbidities affecting pt at time of evaluation include: h/o TIA  Personal factors affecting DC include: lives in 2 story house, inability to navigate community distances, preferred language not English (language barrier) and inability to perform IADLs  At baseline, pt mobilizes independently w/ no AD, and w/ 0 fall(s) in the previous 6 months  Upon evaluation, pt presents w/ the following deficits: weakness, altered sensation, impaired balance and gait deviations  Pt currently requires supervision for transfers, supervision for ambulation w/o AD (1-2 episodes of pt reaching for additional external assistance/support), and supervision w/ LUE support for stair negotiation  Pt's clinical presentation is unstable/unpredictable due to poor blood pressure control, need for input for mobility technique/safety, impaired balance, ongoing medical monitoring/management, and recent drastic decline in mobility compared to baseline  Pt is at an increased risk of falls due to impaired balance, decreased RLE sensation, decreased RLE strength   From a PT/mobility standpoint, given the above findings, DC recommendation is: Home w/ HHPT w/ family support/supervision  During current admission, pt will benefit from continued skilled inpatient PT in the acute care setting in order to address the above deficits and to maximize function and mobility prior to DC from acute care  Goals   STG Expiration Date 08/09/22   Short Term Goal #1 Pt will: perform bed mobility w/ mod I to decrease pt's burden of care and increase pt's independence w/ repositioning in bed; perform transfers w/ mod I to increase pt's OOB mobility; ambulate at least 350' w/ LARD and mod I to increase pt's ambulatory endurance/tolerance; negotiate 12 steps w/ UE support and supervision to facilitate pt returning to previous living environment; increase all balance ratings by at least 1 grade to decrease pt's risk of falls   PT Treatment Day 1  (PT tx note below)   Plan   Treatment/Interventions Functional transfer training;LE strengthening/ROM; Elevations; Therapeutic exercise; Endurance training;Patient/family training;Equipment eval/education; Bed mobility;Gait training   PT Frequency 3-5x/wk   Recommendation   PT Discharge Recommendation Home with outpatient rehabilitation   Equipment Recommended Walker  (pt used RW during PT tx below)   Carola Kelly walker   Change/add to [employer]?  Yes, Change Size   Walker Size Oscar (Ht <5'1")   AM-PAC Basic Mobility Inpatient   Turning in Bed Without Bedrails 3   Lying on Back to Sitting on Edge of Flat Bed 3   Moving Bed to Chair 3   Standing Up From Chair 3   Walk in Room 3   Climb 3-5 Stairs 3   Basic Mobility Inpatient Raw Score 18   Basic Mobility Standardized Score 41 05   Highest Level Of Mobility   JH-HLM Goal 6: Walk 10 steps or more   JH-HLM Achieved 7: Walk 25 feet or more   Additional Treatment Session   Start Time 1335   End Time 1346  (total time: 11 min)   Treatment Assessment At end of PT eval, pt OOB sitting in armchair and agreeable to further PT intervention  Pt and family educated on using RW during ambulation for improved ambulatory endurance and balance w/ pt agreeable to trial  Visual demonstration of gait and turn negotiation w/ RW provided to pt prior to additional mobility  Using RW, pt ambulated 61' w/ supervision and no evidence of LOB  Pt demonstrated ability to negotiate around obstacles w/in room w/o requiring assistance from PT  Pt performed sit>supine bed mobility w/ supervision  Compared to ambulating w/o AD, while using RW pt demonstrated improved ambulatory endurance and balance  Pt and family educated on continued use of RW during current admission and upon DC w/ family confirming understanding  Pt continues to be functioning below baseline level, and remains limited in functional mobility due to decreased RLE strength and impaired balance  Pt will continue to benefit from skilled PT intervention to promote pt's independence w/ functional mobility and progress towards set goals  Continue to recommend DC home with outpatient rehabilitation w/ family support/supervision when medically cleared  PT's family educated on 1st floor set-up as needed upon DC  Equipment Use RW   Additional Treatment Day 1   End of Consult   Patient Position at End of Consult Supine; All needs within reach;Bed/Chair alarm activated       The patient's AM-PAC Basic Mobility Inpatient Short Form Raw Score is 18  A Raw score of greater than 16 suggests the patient may benefit from discharge to home  Please also refer to the recommendation of the Physical Therapist for safe discharge planning      Pt will benefit from skilled inpatient PT during this admission in order to facilitate progress towards goals and to maximize functional independence [facility] w/ family support/supervision        Carolina Simms, PT, DPT  07/30/22

## 2022-07-30 NOTE — ASSESSMENT & PLAN NOTE
78 y o  right handed female with intracranial aneurysm, elevated BP, who presented to [facility] on 7/29/22 with R sided numbness  NIHSS 1 (sensation)  BP on arrival 163/76 but then 237/200  Last known well night of 7/28/22 around 8:30PM   Pt not a tPA candidate due to being outside of tPA time window  R thumb adduction weakness and decreased strength concerning for neuropathy in R hand versus arthritis (pain limitation)     Plan   - recommend outpatient RUE nerve conduction study    MRI brain without any evidence of acute infarct   Echo pending    Lipid Panel: total cholesterol 152   Hemoglobin A1c pending    TSH 4 246   Vitamin B12 pending    S/p aspirin 325 mg load and Plavix 300 mg load on 7/29/22   May resume antiplatelet regimen as was prior to admission due to no stroke, no TIA    Defer statin recommendations to primary team    Recommend goal normotension, management per primary team    Euglycemic, normothermic goal   PT/OT/ST eval and treat as appropriate   Frequent neuro checks  Continue to monitor and notify neurology with any changes   STAT CT head for any acute change in neuro exam  - Medical management and supportive care per primary team  Correction of any metabolic or infectious disturbances    - No further inpatient neurology recommendations at this time  Please reach out with any further questions or concerns

## 2022-07-30 NOTE — SPEECH THERAPY NOTE
Speech-Language Pathology Bedside Swallow Evaluation        Patient Name: Reanna BUTCHER Date: 7/30/2022     Problem List  Patient Active Problem List   Diagnosis    Weakness of right hand    Elevated blood pressure reading    Intracranial aneurysm       Past Medical History  History reviewed  No pertinent past medical history  Past Surgical History  History reviewed  No pertinent surgical history  Current Medical Status  Pt is a 78 y o  female who presented to [address] [place] [address] with numbness and gait disturbance  BP on arrival 163/76 but then 237/200  Last known well night of 7/28/22 around 8:30PM   Pt not a tPA candidate due to being outside of tPA time window  Per ED notes, pt's daughter reports pt woke up at 5 AM 7/29/22 and was complaining of unsteady gait, which continued throughout the day  Then, pt complained of R sided sensory deficits per ED notes (numbness in R face, RUE, RLE, tongue)  Stroke pathway was initiatied  CTA revealed 4mm ICA aneurysm and neurosurgery evaluation was ordered  MRI negative for acute infarction but did show small chronic left basal ganglia lacunar infarction  Upon my arrival patient and RN deny any speech/language changes  She passed her RN dysphagia evaluation and has been reportedly tolerating her current diet  She reports her lingual numbness has resolved but reported new c/o odynophagia c/b globus sensation when she swallows which started approx 1 month ago  She also reports intermittent changes in her voice c/b hoarse vocal quality which also started approx 1 month ago  The patient/daughter also report occasional coughing with thin liquids  They deny any recent weight loss or PNA  Past medical history:   Please see H&P for details    Special Studies:  7/29 MRI Brain: 1  No acute infarction, intracranial hemorrhage or mass effect   2   Mild, chronic microangiopathy is similar to the previous study as is a small chronic left basal ganglia lacunar infarction  7/29 CTA head/neck: Moderate to severe plaque stenosis bilateral cavernous ICA carotid siphon  4 mm right supraclinoid ICA aneurysm  7/29 CT Brain: No acute intracranial abnormality  Swallow Information   Current Risks for Dysphagia & Aspiration: stroke pathway     Current Symptoms/Concerns: stroke pathway, globus sensation    Current Diet: regular diet and thin liquids      Baseline Diet: regular diet and thin liquids      Baseline Assessment   Behavior/Cognition: alert    Speech/Language Status: able to participate in conversation and able to follow commands Patient is Latvian speaking however her daughter is present to translate throughout my visit    Patient Positioning: upright at edge of couch      Swallow Mechanism Exam   Facial: symmetrical  Labial: WFL  Lingual: WFL  Velum: symmetrical  Mandible: adequate ROM  Dentition: adequate  Vocal quality:mildly hoarse   Volitional Cough: strong/productive   Resp: RA    Consistencies Assessed and Performance   Consistencies Administered: thin liquids and hard solids  Specific materials administered included: pulled pork sandwich, salad, thin liquids     Oral Stage:   Mastication was adequate with the materials administered today  Bolus formation and transfer were functional with nosignificant oral residue noted  No overt s/s reduced oral control  Pharyngeal Stage:   Swallowing initiation appeared prompt  Laryngeal rise was palpated and judged to be within functional limits  No coughing, throat clearing, change in vocal quality or respiratory status noted today despite rapid rate/vol of intake with both solids and liquids today  Esophageal Concerns: globus sensation, belching and reflux    Summary   Pts oropharyngeal swallow function appears generally WFL at this time with the materials administered today  s/s suggestive of esophageal dysphagia   Suspect laryngopharyngeal reflux which is likely contributing to her odynophagia and hoarse vocal quality  The daughter reports that when she comes to [place] her diet is different and her sxs increase  Additionally she reports she was previously on a "reflux medication" but was recently taken off of it in [facility]  I cannot fully r/o a pharyngeal dysphagia given the chronic infarct on the MRI ( started approx 1 month ago?) however this is less likely given her reported sxs, history and presentation today  Education was provided in detail to the patient and family on results of my assessment and recommendations including diet, safe swallowing strategies, aspiration/reflux precautions such as foods/drinks to avoid, positioning, etc  Reciprocal comprehension was verbally expressed       Recommendations: regular diet and thin liquids     Recommended Form of Meds: as tolerated ( daughter reported patient with c/o heartburn with multiple pills at once- consider one at a time)    Aspiration precautions and compensatory swallowing strategies: upright posture, slow rate of feeding, small bites/sips and smaller more frequent meals as well as use of added moisteners and avoiding foods/drinks that will increase reflux    Results Reviewed with: patient and family     Dysphagia Goals: pt will tolerate regular textures with thin liquids without s/s of aspiration x2-3    Plan  Will f/u 1-2x      MARYANN Kan , CCC-SLP  Speech Language Pathologist   Available via [address] [place] [address] #[id number]  [place] #[id number]

## 2022-07-30 NOTE — ASSESSMENT & PLAN NOTE
Patient presented with right-sided numbness, weakness, gait imbalance  Last known well night of 7/28/22 around 8:30PM   CT head negative for any acute infarction or bleeding  CTA head and neck negative for any large vessel occlusion, showed a 4 mm ICA aneurysm  MRI brain did not show any acute findings  Mild, chronic microangiopathy is similar to the previous study as is a small chronic left basal ganglia lacunar infarction  Patient was stroke alert  Case discussed with Neurology  Patient be admitted for stroke pathway  Patient loaded with aspirin Plavix per Neurology recommendations  Neurology consult and recommendation appreciated  Lipid Panel: total cholesterol 152  Hemoglobin A1c 5 6  TSH 4 246  Vitamin B12 pending    Plans:  Continue aspirin 81 mg, Plavix 75 mg and atorvastatin 80 mg daily  Follow-up PT OT evaluation  Follow-up speech and swallowing eval   Follow-up echocardiogram, and tele monitoring  Neurology recommends outpatient RUE nerve conduction study  Frequent neuro checks     STAT CT head for any acute change in neuro exam

## 2022-07-30 NOTE — TELEMEDICINE
On-Call Telephone Note      Jenni Fuentes 78 y o  female MRN: [id number]  Unit/Bed#: S -01 Encounter: [id number]    Per provider report, patient presented to the ED with right-sided weakness  NIH score 1  Patient's blood pressure was elevated  Last known well was night of 07/28/2022  Patient was not a tPA candidate due to outside of tPA window  During workup an incidental 4 mm right supraclinoid ICA aneurysm was seen  Available past medical history,social history, surgical history, medication list, drug allergies and review of systems were reviewed  BP (!) 177/97   Pulse 80   Temp 97 5 °F (36 4 °C)   Resp 18   Wt 80 6 kg (177 lb 11 1 oz)   SpO2 98%      Clinical exam per chart review, GCS 15, right  weakness 4+/5 otherwise strength 5/5 throughout    Imaging personally reviewed  And reviewed with Dr Genaro Sinclair  Incidental 4 mm right supraclinoid ICA aneurysm seen    Assessment and Plan  1  Continue neurological checks  2  Recommend SBP<160  3  No neurosurgical intervention warranted  4  This is likely an incidental finding, sent message to office patient will follow-up in 4 weeks as a snpx with Dr Genaro Sinclair  5  No need for transfer  6  STAT CT head with any neurological decline including drop GCS of 2pts within 1 hr   7  Medical management and pain control per primary team  8  Recommend discussing modifible risk factors with patient to include smoking , hypertension, and hyperlipidemia  Recommend close outpatient follow-up for blood pressure  9  Neurosurgery will sign off, patient will follow-up in outpatient setting in 4 weeks for further workup and evaluation of incidental aneurysms    All questions answered  Provider is in agreement with the course of action  A total of 15 minutes was spent discussing the presentation, physical exam and formulating a management plan with the provider

## 2022-07-30 NOTE — PLAN OF CARE
Problem: PHYSICAL THERAPY ADULT  Goal: Performs mobility at highest level of function for planned discharge setting  See evaluation for individualized goals  Description: Treatment/Interventions: Functional transfer training, LE strengthening/ROM, Elevations, Therapeutic exercise, Endurance training, Patient/family training, Equipment eval/education, Bed mobility, Gait training  Equipment Recommended: Misha Medrano (pt used RW during PT tx below)       See flowsheet documentation for full assessment, interventions and recommendations  7/30/2022 1554 by Trent Cabrera PT  Note: Prognosis: Fair  Problem List: Decreased strength, Impaired balance, Decreased mobility, Impaired sensation  Assessment: Karina Colon is a 78 y o  Female who [facility] w/ c/o tongue numbness, dizziness, sensation changes, and gait dysfunction and diagnosis of weakness of R hand  Orders for PT eval and treat received, w/ activity orders of up w/ assist  Comorbidities affecting pt at time of evaluation include: h/o TIA  Personal factors affecting DC include: lives in 2 story house, inability to navigate community distances, preferred language not English (language barrier) and inability to perform IADLs  At baseline, pt mobilizes independently w/ no AD, and w/ 0 fall(s) in the previous 6 months  Upon evaluation, pt presents w/ the following deficits: weakness, altered sensation, impaired balance and gait deviations  Pt currently requires supervision for transfers, supervision for ambulation w/o AD (1-2 episodes of pt reaching for additional external assistance/support), and supervision w/ LUE support for stair negotiation  Pt's clinical presentation is unstable/unpredictable due to poor blood pressure control, need for input for mobility technique/safety, impaired balance, ongoing medical monitoring/management, and recent drastic decline in mobility compared to baseline   Pt is at an increased risk of falls due to impaired balance, decreased RLE sensation, decreased RLE strength  From a PT/mobility standpoint, given the above findings, DC recommendation is: Home w/ HHPT w/ family support/supervision  During current admission, pt will benefit from continued skilled inpatient PT in the acute care setting in order to address the above deficits and to maximize function and mobility prior to DC from acute care  PT Discharge Recommendation: Home with outpatient rehabilitation    See flowsheet documentation for full assessment

## 2022-07-30 NOTE — OCCUPATIONAL THERAPY NOTE
Occupational Therapy Evaluation     Patient Name: August WADE Date: 7/30/2022  Problem List  Principal Problem:    Weakness of right hand  Active Problems:    Hypertensive urgency    Intracranial aneurysm    Hyperlipidemia    Past Medical History  History reviewed  No pertinent past medical history  Past Surgical History  History reviewed  No pertinent surgical history  07/30/22 4775   Note Type   Note type Evaluation   Restrictions/Precautions   Weight Bearing Precautions Per Order No   Other Precautions Chair Alarm; Bed Alarm; Fall Risk  (K[place] Speaking)   Pain Assessment   Pain Assessment Tool 0-10   Pain Score No Pain   Home Living   Type of Home House  (4 LORENE front and 0 LORENE garage)   Home Layout Two level;1/2 bath on main level   Bathroom Shower/Tub Tub/shower unit   Bathroom Toilet Standard   Additional Comments Patient ambulates w/o AD   Prior Function   Level of [place] Independent with ADLs and functional mobility   Lives With Daughter   Amos   IADLs Independent   Falls in the last 6 months 0   ADL   Eating Assistance 7  Independent   Grooming Assistance 5  Supervision/Setup   Grooming Deficit Wash/dry hands  (standing)   UB Bathing Assistance 5  Supervision/Setup   UB Bathing Deficit   (standing)   LB Bathing Assistance Unable to assess   UB Dressing Assistance 5  Supervision/Setup   LB Dressing Assistance 5  Supervision/Setup   LB Dressing Deficit Don/doff R sock; Don/doff L sock   Toileting Assistance  5  Supervision/Setup   Bed Mobility   Supine to Sit Unable to assess  (was reveived sitting on  couch)   Sit to Supine 5  Supervision   Transfers   Sit to Stand 5  Supervision   Additional items Increased time required   Stand to Sit 5  Supervision   Additional items Increased time required   Toilet transfer 5  Supervision   Functional Mobility   Additional Comments Patient ambulated w/o AD at [facility] and with RW at [facility] Static Sitting Good   Dynamic Sitting Good   Static Standing Fair   Dynamic Standing Fair   Activity Tolerance   Activity Tolerance Patient tolerated treatment well   Nurse Made Aware RN   RUE Assessment   RUE Assessment   (patient demonstarted weakness with hand strength and ability to pronate and supinate equally )   RUE Strength   R Forearm Pronation 3+/5   R Forearm Supination 3+/5   RUE Overall Strength   (Noted 3+ for finger adduction and abduction and mass grasp)   LUE Assessment   LUE Assessment WFL   Hand Function   Gross Motor Coordination Functional   Fine Motor Coordination Functional   Sensation   Light Touch   (patient states sensation difference in hand and forearm when copared to LT)   Vision-Basic Assessment   Current Vision Wears glasses all the time   Vision - Complex Assessment   Acuity Able to read clock/calendar on wall without difficulty   Cognition   Overall Cognitive Status [facility]   Arousal/Participation Alert; Cooperative   Orientation Level Oriented X4   Following Commands Follows one step commands with increased time or repetition   Comments Pt ID by wristband name and    Assessment   Limitation Decreased ADL status; Decreased UE strength;Decreased Safe judgement during ADL;Decreased endurance;Decreased self-care trans;Decreased high-level ADLs; Decreased fine motor control   Prognosis Fair   Assessment Patient evaluated by Occupational Therapy  Patient admitted with Weakness of right hand  CT head negative for any acute infarction or bleeding  CTA head and neck negative for any large vessel occlusion, showed a 4 mm ICA aneurysm  The patients occupational profile, medical and therapy history includes a expanded review of medical and/or therapy records and additional review of physical, cognitive, or psychosocial history related to current functional performance  Comorbidities affecting functional mobility and ADLS include: hypertension and hyperlipidemia    Prior to admission, patient was independent with functional mobility without assistive device, independent with ADLS, independent with IADLS and living with daughter in a 2 level home with 4 steps to enter  PatientThe evaluation identifies the following performance deficits: weakness, impaired balance, decreased endurance, decreased coordination, increased fall risk, new onset of impairment of functional mobility, decreased ADLS, decreased IADLS and decreased strength, that result in activity limitations and/or participation restrictions  Patient performed groom and Ub bath sup, UB dressing, LB dressing and Toileting sup  Transfers at sup and functional ambulation Min a w/o Ad CGA with RW  This evaluation requires clinical decision making of moderate complexity, because the patient may present with comorbidities that affect occupational performance and required minimal or moderate modification of tasks or assistance with the consideration of several treatment options  The Barthel Index was used as a functional outcome tool presenting with a score of Barthel Index Score: 75,The patient's raw score on the -PAC Daily Activity inpatient short form is 19, standardized score is 40 22, greater than 39 4  Patients at this level are likely to benefit from DC to home with outpatient OT to address hand weakness  Please refer to the recommendation of the Occupational Therapist for safe DC planning  Patient will benefit from skilled Occupational Therapy services to address above deficits and facilitate a safe return to prior level of function     Goals   Patient Goals "go home"   LTG Time Frame   (8-14)   Long Term Goal #1 Goals established to promote Patient Goals: "go home":  Patient will increase standing tolerance to 10 minutes during ADL task to decrease assistance level and decrease fall risk;Patient will tolerate 10 minutes of UE ROM/strengthening to increase general activity tolerance and performance in ADLS/IADLS; Patient will improve functional activity tolerance to 10 minutes of sustained functional tasks to increase participation in basic self-care and decrease assistance level;   Patient will increase dynamic standing balance to good to improve postural stability and decrease fall risk during standing ADLS and transfers  Functional Transfer Goals   Pt Will Transfer To Bedside Commode With mod indep   Pt Will Transfer To Toilet With mod indep   Pt Will Transfer To Shower With mod indep   ADL Goals   Pt Will Perform Eating Independently   Pt Will Perform Grooming With mod indep   Pt Will Perform Bathing With mod indep   Pt Will Perform UE Dressing With mod indep   Pt Will Perform LE Dressing With mod indep   Pt Will Perform Toileting With mod indep   Plan   Treatment Interventions ADL retraining;Functional transfer training;Patient/family training;Neuromuscular reeducation; Fine motor coordination activities;Continued evaluation   Goal Expiration Date 08/13/22   OT Frequency 2-3x/wk   Recommendation   OT Discharge Recommendation Home with outpatient rehabilitation   AM-PAC Daily Activity Inpatient   Lower Body Dressing 3   Bathing 3   Toileting 3   Upper Body Dressing 3   Grooming 3   Eating 4   Daily Activity Raw Score 19   Daily Activity Standardized Score (Calc for Raw Score >=11) 40 22   AM-PAC Applied Cognition Inpatient   Following a Speech/Presentation 4   Understanding Ordinary Conversation 4   Taking Medications 3   Remembering Where Things Are Placed or Put Away 4   Remembering List of 4-5 Errands 3   Taking Care of Complicated Tasks 3   Applied Cognition Raw Score 21   Applied Cognition Standardized Score 44 3   Barthel Index   Feeding 10   Bathing 5   Grooming Score 0   Dressing Score 5   Bladder Score 10   Bowels Score 10   Toilet Use Score 10   Transfers (Bed/Chair) Score 10   Mobility (Level Surface) Score 10   Stairs Score 5   Barthel Index Score 75   Lynette Bosch, OT

## 2022-07-30 NOTE — PLAN OF CARE
Recommendations: regular diet and thin liquids     Recommended Form of Meds: as tolerated ( daughter reported patient with c/o heartburn with multiple pills at once- consider one at a time)    Aspiration precautions and compensatory swallowing strategies: upright posture, slow rate of feeding, small bites/sips and smaller more frequent meals as well as use of added moisteners and avoiding foods/drinks that will increase reflux    Dysphagia Goals: pt will tolerate regular textures with thin liquids without s/s of aspiration x2-3

## 2022-07-30 NOTE — ASSESSMENT & PLAN NOTE
Lipid panel with total cholesterol 152, , HDL 49  No home medication  Patient had received a dose of Lipitor 40 mg at ED  Neurology recommended to continue Lipitor 80 mg daily  Defers statin? Recommends lifestyle modification with diet and exercise

## 2022-07-30 NOTE — ASSESSMENT & PLAN NOTE
4 mm right ICA aneurysm seen on CT head and neck  New for patient  Neurology and Neurosurgery evaluation consults and recommendation appreciated  Plans:  Continue neurological checks  Keep SBP<160  No neurosurgical intervention per Neurosurgery  This is likely an incidental finding, sent message to office patient will follow-up in 4 weeks as a snpx with Dr Umair Garcia  STAT CT head with any neurological decline including drop GCS of 2pts within 1 hr   Counseling on smoking , hypertension, and hyperlipidemia  Recommend close outpatient follow-up for blood pressure    Follow-up neurosurgery in outpatient setting in 4 weeks for further workup and evaluation of incidental aneurysms

## 2022-07-30 NOTE — PLAN OF CARE
Problem: OCCUPATIONAL THERAPY ADULT  Goal: Performs self-care activities at highest level of function for planned discharge setting  See evaluation for individualized goals  Description: Treatment Interventions: ADL retraining, Functional transfer training, Patient/family training, Neuromuscular reeducation, Fine motor coordination activities, Continued evaluation          See flowsheet documentation for full assessment, interventions and recommendations  Note: Limitation: Decreased ADL status, Decreased UE strength, Decreased Safe judgement during ADL, Decreased endurance, Decreased self-care trans, Decreased high-level ADLs, Decreased fine motor control  Prognosis: Fair  Assessment: Patient evaluated by Occupational Therapy  Patient admitted with Weakness of right hand  CT head negative for any acute infarction or bleeding  CTA head and neck negative for any large vessel occlusion, showed a 4 mm ICA aneurysm  The patients occupational profile, medical and therapy history includes a expanded review of medical and/or therapy records and additional review of physical, cognitive, or psychosocial history related to current functional performance  Comorbidities affecting functional mobility and ADLS include: hypertension and hyperlipidemia  Prior to admission, patient was independent with functional mobility without assistive device, independent with ADLS, independent with IADLS and living with daughter in a 2 level home with 4 steps to enter  PatientThe evaluation identifies the following performance deficits: weakness, impaired balance, decreased endurance, decreased coordination, increased fall risk, new onset of impairment of functional mobility, decreased ADLS, decreased IADLS and decreased strength, that result in activity limitations and/or participation restrictions  Patient performed groom and Ub bath sup, UB dressing, LB dressing and Toileting sup   Transfers at sup and functional ambulation Min a w/o Ad CGA with RW  This evaluation requires clinical decision making of moderate complexity, because the patient may present with comorbidities that affect occupational performance and required minimal or moderate modification of tasks or assistance with the consideration of several treatment options  The Barthel Index was used as a functional outcome tool presenting with a score of Barthel Index Score: 75,The patient's raw score on the -PAC Daily Activity inpatient short form is 19, standardized score is 40 22, greater than 39 4  Patients at this level are likely to benefit from DC to home with outpatient OT to address hand weakness  Please refer to the recommendation of the Occupational Therapist for safe DC planning  Patient will benefit from skilled Occupational Therapy services to address above deficits and facilitate a safe return to prior level of function       OT Discharge Recommendation: Home with outpatient rehabilitation

## 2022-07-30 NOTE — PROGRESS NOTES
[facility]  Progress Note Mickey Fothergill 1945, 78 y o  female MRN: [id number]  Unit/Bed#: S -[facility]amar Encounter: [id number]  Primary Care Provider: No primary care provider on file  Date and time admitted to hospital: 7/29/2022  4:50 PM    * Weakness of right hand  Assessment & Plan  Patient presented with right-sided numbness, weakness, gait imbalance  Last known well night of 7/28/22 around 8:30PM   CT head negative for any acute infarction or bleeding  CTA head and neck negative for any large vessel occlusion, showed a 4 mm ICA aneurysm  MRI brain did not show any acute findings  Mild, chronic microangiopathy is similar to the previous study as is a small chronic left basal ganglia lacunar infarction  Patient was stroke alert  Case discussed with Neurology  Patient be admitted for stroke pathway  Patient loaded with aspirin Plavix per Neurology recommendations  Neurology consult and recommendation appreciated  Lipid Panel: total cholesterol 152  Hemoglobin A1c 5 6  TSH 4 246  Vitamin B12 pending    Plans:  Continue aspirin 81 mg, Plavix 75 mg and atorvastatin 80 mg daily  Follow-up PT OT evaluation  Follow-up speech and swallowing eval   Follow-up echocardiogram, and tele monitoring  Neurology recommends outpatient RUE nerve conduction study  Frequent neuro checks  STAT CT head for any acute change in neuro exam        Hyperlipidemia  Assessment & Plan  Lipid panel with total cholesterol 152, , HDL 49  No home medication  Patient had received a dose of Lipitor 40 mg at ED  Neurology recommended to continue Lipitor 80 mg daily  Defers statin? Recommends lifestyle modification with diet and exercise  Intracranial aneurysm  Assessment & Plan  4 mm right ICA aneurysm seen on CT head and neck  New for patient  Neurology and Neurosurgery evaluation consults and recommendation appreciated      Plans:  Continue neurological checks  Keep SBP<160  No neurosurgical intervention per Neurosurgery  This is likely an incidental finding, sent message to office patient will follow-up in 4 weeks as a snpx with Dr Yarely Griffin  STAT CT head with any neurological decline including drop GCS of 2pts within 1 hr   Counseling on smoking , hypertension, and hyperlipidemia  Recommend close outpatient follow-up for blood pressure  Follow-up neurosurgery in outpatient setting in 4 weeks for further workup and evaluation of incidental aneurysms    Hypertensive urgency  Assessment & Plan  Patient blood pressure is significantly elevated on admission  Patient without any known history of hypertension  Blood pressure now significantly improved without any interventions  Continue monitor closely for now  VTE Pharmacologic Prophylaxis: VTE Score: 3 Moderate Risk (Score 3-4) - Pharmacological DVT Prophylaxis Ordered: heparin  Patient Centered Rounds: I performed bedside rounds with nursing staff today  Discussions with Specialists or Other Care Team Provider:  Neurology, Neurosurgery    Education and Discussions with Family / Patient: Updated  (daughter) at bedside  Patient's daughter is at bedside during rounds  Current Length of Stay: 1 day(s)  Current Patient Status: Inpatient   Discharge Plan: Anticipate discharge in 24-48 hrs to home  Code Status: Level 1 - Full Code    Subjective:   Patient seen at bedside  Communicated via  on phone this morning  Patient reports that her symptoms are improved  She feels better  Denied new neurological symptoms  No significant overnight event  Discussed imaging findings and treatment plans for today  Patient wants me to call to her daughter      Objective:     Vitals:   Temp (24hrs), Av 9 °F (36 6 °C), Min:97 5 °F (36 4 °C), Max:98 2 °F (36 8 °C)    Temp:  [97 5 °F (36 4 °C)-98 2 °F (36 8 °C)] 97 5 °F (36 4 °C)  HR:  [65-83] 80  Resp:  [16-18] 18  BP: (143-237)/() 177/97  SpO2:  [96 %-100 %] 98 %  There is no height or weight on file to calculate BMI  Input and Output Summary (last 24 hours):   No intake or output data in the 24 hours ending 07/30/22 1336    Physical Exam:   Physical Exam  Vitals and nursing note reviewed  Constitutional:       General: She is not in acute distress  Appearance: Normal appearance  She is well-developed  HENT:      Head: Normocephalic and atraumatic  Mouth/Throat:      Mouth: Mucous membranes are moist       Pharynx: Oropharynx is clear  Eyes:      Extraocular Movements: Extraocular movements intact  Conjunctiva/sclera: Conjunctivae normal       Pupils: Pupils are equal, round, and reactive to light  Cardiovascular:      Rate and Rhythm: Normal rate and regular rhythm  Heart sounds: Normal heart sounds  No murmur heard  No gallop  Pulmonary:      Effort: Pulmonary effort is normal  No respiratory distress  Breath sounds: Normal breath sounds  No wheezing or rales  Abdominal:      General: Bowel sounds are normal  There is no distension  Palpations: Abdomen is soft  Tenderness: There is no abdominal tenderness  Musculoskeletal:         General: No swelling  Cervical back: Neck supple  Right lower leg: No edema  Left lower leg: No edema  Skin:     General: Skin is warm and dry  Capillary Refill: Capillary refill takes less than 2 seconds  Coloration: Skin is not jaundiced or pale  Findings: No bruising or lesion  Neurological:      Mental Status: She is alert and oriented to person, place, and time  Cranial Nerves: No cranial nerve deficit  Motor: Weakness present  Comments: Slight weakness on right upper extremity  Motor 4/5 on right UE, 5/5 on left UE  Limited range of motion of right leg due to pain at right hip            Additional Data:     Labs:  Results from last 7 days   Lab Units 07/30/22  0613   WBC Thousand/uL 5 34   HEMOGLOBIN g/dL 11 3*   HEMATOCRIT % 35 2   PLATELETS Thousands/uL 322     Results from last 7 days   Lab Units 07/30/22  0613   SODIUM mmol/L 140   POTASSIUM mmol/L 3 7   CHLORIDE mmol/L 108   CO2 mmol/L 27   BUN mg/dL 13   CREATININE mg/dL 0 68   ANION GAP mmol/L 5   CALCIUM mg/dL 7 9*   ALBUMIN g/dL 3 4*   TOTAL BILIRUBIN mg/dL 0 44   ALK PHOS U/L 69   ALT U/L 11   AST U/L 17   GLUCOSE RANDOM mg/dL 84     Results from last 7 days   Lab Units 07/29/22  1711   INR  0 92     Results from last 7 days   Lab Units 07/29/22  1702   POC GLUCOSE mg/dl 115     Results from last 7 days   Lab Units 07/30/22  0613   HEMOGLOBIN A1C % 5 6           Lines/Drains:  Invasive Devices  Report    Peripheral Intravenous Line  Duration           Peripheral IV 07/29/22 Right Antecubital <1 day                  Telemetry:  Telemetry Orders (From admission, onward)             48 Hour Telemetry Monitoring  Continuous x 48 hours        References:    Telemetry Guidelines   Question:  Reason for 48 Hour Telemetry  Answer:  Acute CVA (<24 hrs old, hemispheric strokes, selected brainstem strokes, cardiac arrhythmias)                 Telemetry Reviewed: Normal Sinus Rhythm  Indication for Continued Telemetry Use: Acute CVA right-sided weakness    Imaging: Reviewed radiology reports from this admission including: MRI brain  MRI brain wo contrast   Final Result by Meghan Burciaga MD (07/30 0830)         1  No acute infarction, intracranial hemorrhage or mass effect  2   Mild, chronic microangiopathy is similar to the previous study as is a small chronic left basal ganglia lacunar infarction  Workstation performed: [id number]         CTA stroke alert (head/neck)   Final Result by Danette Goncalves MD (07/29 5684)      Moderate to severe plaque stenosis bilateral cavernous ICA carotid siphon  4 mm right supraclinoid ICA aneurysm              I personally discussed this study with neurologist  on 7/29/2022 at 5:28 PM  Workstation performed: [id number]         CT stroke alert brain   Final Result by Sena Maxwell MD (07/29 1728)      No acute intracranial abnormality  I personally discussed this study with neurologist  on 7/29/2022 at 5:28 PM                Workstation performed: [id number]             Recent Cultures (last 7 days):         Last 24 Hours Medication List:   Current Facility-Administered Medications   Medication Dose Route Frequency Provider Last Rate    aspirin  81 mg Oral Daily May Lili Mckeon MD      atorvastatin  80 mg Oral QPM May Lili Mckeon MD      clopidogrel  75 mg Oral Daily Justa Almaraz MD      heparin (porcine)  5,000 Units Subcutaneous [facility] 62 Justa Almaraz MD          Today, Patient Was Seen By: Maeve Mckeon MD    **Please Note: This note may have been constructed using a voice recognition system  **

## 2022-07-31 VITALS
OXYGEN SATURATION: 97 % | RESPIRATION RATE: 18 BRPM | BODY MASS INDEX: 35.68 KG/M2 | HEART RATE: 83 BPM | WEIGHT: 177 LBS | HEIGHT: 59 IN | DIASTOLIC BLOOD PRESSURE: 78 MMHG | TEMPERATURE: 98.2 F | SYSTOLIC BLOOD PRESSURE: 141 MMHG

## 2022-07-31 LAB
ANION GAP SERPL CALCULATED.3IONS-SCNC: 7 MMOL/L (ref 4–13)
AORTIC ROOT: 3.2 CM
APICAL FOUR CHAMBER EJECTION FRACTION: 63 %
AV REGURGITATION PRESSURE HALF TIME: 473 MS
BUN SERPL-MCNC: 12 MG/DL (ref 5–25)
CALCIUM SERPL-MCNC: 7.9 MG/DL (ref 8.4–10.2)
CHLORIDE SERPL-SCNC: 110 MMOL/L (ref 96–108)
CO2 SERPL-SCNC: 24 MMOL/L (ref 21–32)
CREAT SERPL-MCNC: 0.63 MG/DL (ref 0.6–1.3)
E WAVE DECELERATION TIME: 220 MS
ERYTHROCYTE [DISTWIDTH] IN BLOOD BY AUTOMATED COUNT: 16.3 % (ref 11.6–15.1)
FRACTIONAL SHORTENING: 41 % (ref 28–44)
GFR SERPL CREATININE-BSD FRML MDRD: 88 ML/MIN/1.73SQ M
GLUCOSE SERPL-MCNC: 82 MG/DL (ref 65–140)
HCT VFR BLD AUTO: 36.4 % (ref 34.8–46.1)
HGB BLD-MCNC: 11.7 G/DL (ref 11.5–15.4)
INTERVENTRICULAR SEPTUM IN DIASTOLE (PARASTERNAL SHORT AXIS VIEW): 1.1 CM
INTERVENTRICULAR SEPTUM: 1.1 CM (ref 0.6–1.1)
LAAS-AP2: 20.4 CM2
LAAS-AP4: 21.6 CM2
LEFT ATRIUM SIZE: 4 CM
LEFT INTERNAL DIMENSION IN SYSTOLE: 2.3 CM (ref 2.1–4)
LEFT VENTRICULAR INTERNAL DIMENSION IN DIASTOLE: 3.9 CM (ref 3.5–6)
LEFT VENTRICULAR POSTERIOR WALL IN END DIASTOLE: 1.1 CM
LEFT VENTRICULAR STROKE VOLUME: 48 ML
LVSV (TEICH): 48 ML
MCH RBC QN AUTO: 25.1 PG (ref 26.8–34.3)
MCHC RBC AUTO-ENTMCNC: 32.1 G/DL (ref 31.4–37.4)
MCV RBC AUTO: 78 FL (ref 82–98)
MV E'TISSUE VEL-SEP: 4 CM/S
MV PEAK A VEL: 0.95 M/S
MV PEAK E VEL: 51 CM/S
MV STENOSIS PRESSURE HALF TIME: 64 MS
MV VALVE AREA P 1/2 METHOD: 3.44 CM2
PLATELET # BLD AUTO: 314 THOUSANDS/UL (ref 149–390)
PMV BLD AUTO: 10.1 FL (ref 8.9–12.7)
POTASSIUM SERPL-SCNC: 3.8 MMOL/L (ref 3.5–5.3)
RBC # BLD AUTO: 4.67 MILLION/UL (ref 3.81–5.12)
RIGHT ATRIUM AREA SYSTOLE A4C: 12.5 CM2
RIGHT VENTRICLE ID DIMENSION: 3.3 CM
SL CV AV DECELERATION TIME RETROGRADE: 1630 MS
SL CV AV PEAK GRADIENT RETROGRADE: 91 MMHG
SL CV LEFT ATRIUM LENGTH A2C: 5.6 CM
SL CV LV EF: 65
SL CV PED ECHO LEFT VENTRICLE DIASTOLIC VOLUME (MOD BIPLANE) 2D: 67 ML
SL CV PED ECHO LEFT VENTRICLE SYSTOLIC VOLUME (MOD BIPLANE) 2D: 19 ML
SODIUM SERPL-SCNC: 141 MMOL/L (ref 135–147)
TR MAX PG: 22 MMHG
TR PEAK VELOCITY: 2.3 M/S
TRICUSPID VALVE PEAK REGURGITATION VELOCITY: 2.33 M/S
WBC # BLD AUTO: 5.86 THOUSAND/UL (ref 4.31–10.16)

## 2022-07-31 PROCEDURE — 93306 TTE W/DOPPLER COMPLETE: CPT | Performed by: INTERNAL MEDICINE

## 2022-07-31 PROCEDURE — 85027 COMPLETE CBC AUTOMATED: CPT

## 2022-07-31 PROCEDURE — 99239 HOSP IP/OBS DSCHRG MGMT >30: CPT | Performed by: INTERNAL MEDICINE

## 2022-07-31 PROCEDURE — 80048 BASIC METABOLIC PNL TOTAL CA: CPT

## 2022-07-31 RX ORDER — ATORVASTATIN CALCIUM 80 MG/1
40 TABLET, FILM COATED ORAL EVERY EVENING
Qty: 30 TABLET | Refills: 0 | Status: SHIPPED | OUTPATIENT
Start: 2022-07-31 | End: 2022-08-02 | Stop reason: SDUPTHER

## 2022-07-31 RX ORDER — AMLODIPINE BESYLATE 5 MG/1
5 TABLET ORAL DAILY
Qty: 30 TABLET | Refills: 0 | Status: SHIPPED | OUTPATIENT
Start: 2022-08-01 | End: 2022-08-02 | Stop reason: SDUPTHER

## 2022-07-31 RX ORDER — ASPIRIN 81 MG/1
81 TABLET ORAL DAILY
Qty: 30 TABLET | Refills: 0 | Status: SHIPPED | OUTPATIENT
Start: 2022-08-01 | End: 2022-08-02 | Stop reason: SDUPTHER

## 2022-07-31 RX ORDER — AMLODIPINE BESYLATE 5 MG/1
5 TABLET ORAL DAILY
Status: DISCONTINUED | OUTPATIENT
Start: 2022-07-31 | End: 2022-07-31 | Stop reason: HOSPADM

## 2022-07-31 RX ADMIN — AMLODIPINE BESYLATE 5 MG: 5 TABLET ORAL at 11:49

## 2022-07-31 RX ADMIN — ASPIRIN 81 MG: 81 TABLET, COATED ORAL at 08:27

## 2022-07-31 RX ADMIN — CLOPIDOGREL BISULFATE 75 MG: 75 TABLET ORAL at 08:27

## 2022-07-31 NOTE — INCIDENTAL FINDINGS
The following findings require follow up:  Radiographic finding   Findin mm right ICA aneurysm seen on CT head and neck  Follow up required: yes   Follow up should be done within 4 week(s) Follow-up neurosurgery in outpatient setting in 4 weeks for further workup and evaluation of incidental aneurysms    Please notify the following clinician to assist with the follow up: Your PCP

## 2022-07-31 NOTE — PROGRESS NOTES
[facility]  Progress Note Russell Salcedo 1945, 78 y o  female MRN: [id number]  Unit/Bed#: S -[facility] Encounter: [id number]  Primary Care Provider: No primary care provider on file  Date and time admitted to hospital: 7/29/2022  4:50 PM    * Weakness of right hand  Assessment & Plan  Patient presented with right-sided numbness, weakness, gait imbalance  Last known well night of 7/28/22 around 8:30PM   CT head negative for any acute infarction or bleeding  CTA head and neck negative for any large vessel occlusion, showed a 4 mm ICA aneurysm  MRI brain did not show any acute findings  Mild, chronic microangiopathy is similar to the previous study as is a small chronic left basal ganglia lacunar infarction  Patient was stroke alert  Case discussed with Neurology  Patient be admitted for stroke pathway  Patient loaded with aspirin Plavix per Neurology recommendations  Neurology consult and recommendation appreciated  Lipid Panel: total cholesterol 152  Hemoglobin A1c 5 6  TSH 4 246  Vitamin B12 2773    Plans:  Continue aspirin 81 mg,and atorvastatin 80 mg daily  Frequent neuro checks  Outpatient EMG nerve conduction test       Hypertensive urgency  Assessment & Plan  Patient blood pressure was significantly elevated on admission  237/200  This morning BP is 157/93  Plan:  5 mg p o  Amlodipine has been added  Continue  Reassess response  On discharge, patient will continue aspirin 81 mg daily and atorvastatin 80 mg daily  Patient will need to establish care with a PCP for ongoing blood pressure management        Intracranial aneurysm  Assessment & Plan  4 mm right ICA aneurysm seen on CT head and neck  New for patient  Neurology and Neurosurgery evaluation consults and recommendation appreciated  Plans:  Continue neurological check  Counseling on smoking , hypertension, and hyperlipidemia      Recommend close outpatient follow-up for blood pressure  Follow-up neurosurgery in outpatient setting in 4 weeks for further workup and evaluation of incidental aneurysms    Hyperlipidemia  Assessment & Plan  Lipid panel with total cholesterol 152, , HDL 49  No home medication  Patient had received a dose of Lipitor 40 mg at ED  Since admission patient has been on 80 mg atorvastatin daily/    Plan  Continue the 80 mg of atorvastatin  Encouraged lifestyle modifications        VTE Pharmacologic Prophylaxis: VTE Score: 3 Moderate Risk (Score 3-4) - Pharmacological DVT Prophylaxis Ordered: heparin  Patient Centered Rounds: I performed bedside rounds with nursing staff today  Discussions with Specialists or Other Care Team Provider: Neurosurgery and neurology    Education and Discussions with Family / Patient: Updated  (daughter) via phone  Current Length of Stay: 2 day(s)  Current Patient Status: Inpatient   Discharge Plan: Anticipate discharge in 48 hrs to home with home services  Code Status: Level 1 - Full Code    Subjective:   Patient is oriented and alert  Patient is Maltese speaking only  Daughter was able to translate  Rupper and right lower extremity weakness is improving  Denies any chest pain, SOB n/v diarrhea  Objective:     Vitals:   Temp (24hrs), Av 9 °F (36 6 °C), Min:97 6 °F (36 4 °C), Max:98 2 °F (36 8 °C)    Temp:  [97 6 °F (36 4 °C)-98 2 °F (36 8 °C)] 98 2 °F (36 8 °C)  HR:  [71-87] 73  Resp:  [16-18] 18  BP: (147-168)/(86-93) 157/93  SpO2:  [96 %-97 %] 97 %  Body mass index is 35 75 kg/m²  Input and Output Summary (last 24 hours):   No intake or output data in the 24 hours ending 22 1150    Physical Exam:   Physical Exam  Vitals and nursing note reviewed  Constitutional:       General: She is not in acute distress  Appearance: She is well-developed  HENT:      Head: Normocephalic and atraumatic     Eyes:      Conjunctiva/sclera: Conjunctivae normal    Cardiovascular:      Rate and Rhythm: Normal rate and regular rhythm  Heart sounds: No murmur heard  Pulmonary:      Effort: Pulmonary effort is normal  No respiratory distress  Breath sounds: Normal breath sounds  Abdominal:      Palpations: Abdomen is soft  Tenderness: There is no abdominal tenderness  Musculoskeletal:      Cervical back: Neck supple  Skin:     General: Skin is warm and dry  Neurological:      Mental Status: She is alert  Additional Data:     Labs:  Results from last 7 days   Lab Units 07/31/22  0444   WBC Thousand/uL 5 86   HEMOGLOBIN g/dL 11 7   HEMATOCRIT % 36 4   PLATELETS Thousands/uL 314     Results from last 7 days   Lab Units 07/31/22  0444 07/30/22  0613   SODIUM mmol/L 141 140   POTASSIUM mmol/L 3 8 3 7   CHLORIDE mmol/L 110* 108   CO2 mmol/L 24 27   BUN mg/dL 12 13   CREATININE mg/dL 0 63 0 68   ANION GAP mmol/L 7 5   CALCIUM mg/dL 7 9* 7 9*   ALBUMIN g/dL  --  3 4*   TOTAL BILIRUBIN mg/dL  --  0 44   ALK PHOS U/L  --  69   ALT U/L  --  11   AST U/L  --  17   GLUCOSE RANDOM mg/dL 82 84     Results from last 7 days   Lab Units 07/29/22  1711   INR  0 92     Results from last 7 days   Lab Units 07/29/22  1702   POC GLUCOSE mg/dl 115     Results from last 7 days   Lab Units 07/30/22  0613   HEMOGLOBIN A1C % 5 6           Lines/Drains:  Invasive Devices  Report    Peripheral Intravenous Line  Duration           Peripheral IV 07/29/22 Right Antecubital 1 day                  Telemetry:  Telemetry Orders (From admission, onward)             48 Hour Telemetry Monitoring  Continuous x 48 hours        Expiring   References:    Telemetry Guidelines   Question:  Reason for 48 Hour Telemetry  Answer:  Acute CVA (<24 hrs old, hemispheric strokes, selected brainstem strokes, cardiac arrhythmias)                 Telemetry Reviewed: Normal Sinus Rhythm  Indication for Continued Telemetry Use: No indication for continued use  Will discontinue  Imaging: No pertinent imaging reviewed      Recent Cultures (last 7 days):         Last 24 Hours Medication List:   Current Facility-Administered Medications   Medication Dose Route Frequency Provider Last Rate    amLODIPine  5 mg Oral Daily Rhianna Angel MD      aspirin  81 mg Oral Daily May Thu Cortez Morales MD      atorvastatin  80 mg Oral QPM May Thu MD Evert      clopidogrel  75 mg Oral Daily Zion Richard MD      heparin (porcine)  5,000 Units Subcutaneous [facility] Zion Richard MD          Today, Patient Was Seen By: Ana Isabel MD    **Please Note: This note may have been constructed using a voice recognition system  **

## 2022-07-31 NOTE — ASSESSMENT & PLAN NOTE
4 mm right ICA aneurysm seen on CT head and neck  New for patient  Neurology and Neurosurgery evaluation consults and recommendation appreciated  Plans:  Continue neurological check  Counseling on smoking , hypertension, and hyperlipidemia  Recommend close outpatient follow-up for blood pressure    Follow-up neurosurgery in outpatient setting in 4 weeks for further workup and evaluation of incidental aneurysms

## 2022-07-31 NOTE — PLAN OF CARE
Problem: Neurological Deficit  Goal: Neurological status is stable or improving  Description: Interventions:  - Monitor and assess patient's level of consciousness, motor function, sensory function, and level of assistance needed for ADLs  - Monitor and report changes from baseline  Collaborate with interdisciplinary team to initiate plan and implement interventions as ordered  - Provide and maintain a safe environment  - Consider seizure precautions  - Consider fall precautions  - Consider aspiration precautions  - Consider bleeding precautions  7/31/2022 1532 by Yoni Tierney RN  Outcome: Adequate for Discharge  7/31/2022 1022 by Yoni Tierney RN  Outcome: Progressing     Problem: Activity Intolerance/Impaired Mobility  Goal: Mobility/activity is maintained at optimum level for patient  Description: Interventions:  - Assess and monitor patient  barriers to mobility and need for assistive/adaptive devices  - Assess patient's emotional response to limitations  - Collaborate with interdisciplinary team and initiate plans and interventions as ordered  - Encourage independent activity per ability   - Maintain proper body alignment  - Perform active/passive rom as tolerated/ordered  - Plan activities to conserve energy   - Turn patient as appropriate  7/31/2022 1532 by Yoni Tierney RN  Outcome: Adequate for Discharge  7/31/2022 1022 by Yoni Tierney RN  Outcome: Progressing     Problem: Communication Impairment  Goal: Ability to express needs and understand communication  Description: Assess patient's communication skills and ability to understand information  Patient will demonstrate use of effective communication techniques, alternative methods of communication and understanding even if not able to speak  - Encourage communication and provide alternate methods of communication as needed  - Collaborate with case management/ for discharge needs    - Include patient/family/caregiver in decisions related to communication  7/31/2022 1532 by Edson Gracia RN  Outcome: Adequate for Discharge  7/31/2022 1022 by Edson Gracia RN  Outcome: Progressing     Problem: Potential for Aspiration  Goal: Non-ventilated patient's risk of aspiration is minimized  Description: Assess and monitor vital signs, respiratory status, and labs (WBC)  Monitor for signs of aspiration (tachypnea, cough, rales, wheezing, cyanosis, fever)  - Assess and monitor patient's ability to swallow  - Place patient up in chair to eat if possible  - HOB up at 90 degrees to eat if unable to get patient up into chair   - Supervise patient during oral intake  - Instruct patient/ family to take small bites  - Instruct patient/ family to take small single sips when taking liquids  - Follow patient-specific strategies generated by speech pathologist   7/31/2022 1532 by Edson Gracia RN  Outcome: Adequate for Discharge  7/31/2022 1022 by Edson Gracia RN  Outcome: Progressing  Goal: Ventilated patient's risk of aspiration is minimized  Description: Assess and monitor vital signs, respiratory status, airway cuff pressure, and labs (WBC)  Monitor for signs of aspiration (tachypnea, cough, rales, wheezing, cyanosis, fever)  - Elevate head of bed 30 degrees if patient has tube feeding   - Monitor tube feeding  7/31/2022 1532 by Edson Gracia RN  Outcome: Adequate for Discharge  7/31/2022 1022 by Edson Gracia RN  Outcome: Progressing     Problem: Nutrition  Goal: Nutrition/Hydration status is improving  Description: Monitor and assess patient's nutrition/hydration status for malnutrition (ex- brittle hair, bruises, dry skin, pale skin and conjunctiva, muscle wasting, smooth red tongue, and disorientation)  Collaborate with interdisciplinary team and initiate plan and interventions as ordered  Monitor patient's weight and dietary intake as ordered or per policy   Utilize nutrition screening tool and intervene per policy  Determine patient's food preferences and provide high-protein, high-caloric foods as appropriate  - Assist patient with eating   - Allow adequate time for meals   - Encourage patient to take dietary supplement as ordered  - Collaborate with clinical nutritionist   - Include patient/family/caregiver in decisions related to nutrition    7/31/2022 1532 by Angie Akers, RN  Outcome: Adequate for Discharge  7/31/2022 1022 by Angie Akers RN  Outcome: Progressing

## 2022-07-31 NOTE — ASSESSMENT & PLAN NOTE
Patient presented with right-sided numbness, weakness, gait imbalance  Last known well night of 7/28/22 around 8:30PM   CT head negative for any acute infarction or bleeding  CTA head and neck negative for any large vessel occlusion, showed a 4 mm ICA aneurysm  MRI brain did not show any acute findings  Mild, chronic microangiopathy is similar to the previous study as is a small chronic left basal ganglia lacunar infarction  Patient was stroke alert  Case discussed with Neurology  Patient be admitted for stroke pathway  Patient loaded with aspirin Plavix per Neurology recommendations  Neurology consult and recommendation appreciated  Lipid Panel: total cholesterol 152  Hemoglobin A1c 5 6  TSH 4 246  Vitamin B12 2773    Plans:  Continue aspirin 81 mg,and atorvastatin 80 mg daily    Outpatient EMG nerve conduction test

## 2022-07-31 NOTE — ASSESSMENT & PLAN NOTE
Patient blood pressure was significantly elevated on admission  237/200  This morning BP is 157/93  Plan:  Continue amlodipine 5 mg daily  Patient will be discharged on amlodipine 5 mg    continue aspirin 81 mg daily and atorvastatin 80 mg daily    Patient will need to establish care with a PCP for ongoing blood pressure management

## 2022-07-31 NOTE — DISCHARGE SUMMARY
[facility]  Discharge- Travis Medrano 1945, 78 y o  female MRN: [id number]  Unit/Bed#: S -01 Encounter: [id number]  Primary Care Provider: No primary care provider on file  Date and time admitted to hospital: 7/29/2022  4:50 PM    * Weakness of right hand  Assessment & Plan  Patient presented with right-sided numbness, weakness, gait imbalance  Last known well night of 7/28/22 around 8:30PM   CT head negative for any acute infarction or bleeding  CTA head and neck negative for any large vessel occlusion, showed a 4 mm ICA aneurysm  MRI brain did not show any acute findings  Mild, chronic microangiopathy is similar to the previous study as is a small chronic left basal ganglia lacunar infarction  Patient was stroke alert  Case discussed with Neurology  Patient be admitted for stroke pathway  Patient loaded with aspirin Plavix per Neurology recommendations  Neurology consult and recommendation appreciated  Lipid Panel: total cholesterol 152  Hemoglobin A1c 5 6  TSH 4 246  Vitamin B12 2773    Plans:  Continue aspirin 81 mg,and atorvastatin 80 mg daily  Outpatient EMG nerve conduction test       Hypertensive urgency  Assessment & Plan  Patient blood pressure was significantly elevated on admission  237/200  This morning BP is 157/93  Plan:  Continue amlodipine 5 mg daily  Patient will be discharged on amlodipine 5 mg    continue aspirin 81 mg daily and atorvastatin 80 mg daily  Patient will need to establish care with a PCP for ongoing blood pressure management        Intracranial aneurysm  Assessment & Plan  4 mm right ICA aneurysm seen on CT head and neck  New for patient  Neurology and Neurosurgery was onboard    Plans:  Continue neurological check  Counseling on smoking , hypertension, and hyperlipidemia  Recommend close outpatient follow-up for blood pressure    Follow-up neurosurgery in outpatient setting in 4 weeks for further workup and evaluation of incidental aneurysms    Hyperlipidemia  Assessment & Plan  Lipid panel with total cholesterol 152, , HDL 49  No home medication  Patient had received a dose of Lipitor 40 mg at ED  Since admission patient has been on 80 mg atorvastatin daily/    Plan  Continue the 80 mg of atorvastatin  Encouraged lifestyle modifications      Medical Problems             Resolved Problems  Date Reviewed: 7/31/2022   None               Discharging Resident: Zaida Wolfe MD  Discharging Attending: No att  providers found  PCP: No primary care provider on file  Admission Date:   Admission Orders (From admission, onward)     Ordered        07/29/22 1941  Inpatient Admission  Once            07/29/22 1843  Place in Observation  Once                      Discharge Date: 07/31/22    Consultations During Hospital Stay:  · Neurology and Neurosurgery     Procedures Performed:   · None    Significant Findings / Test Results:   MRI brain wo contrast    Result Date: 7/30/2022  Impression: 1  No acute infarction, intracranial hemorrhage or mass effect  2   Mild, chronic microangiopathy is similar to the previous study as is a small chronic left basal ganglia lacunar infarction  CT stroke alert brain    Result Date: 7/29/2022  Impression: No acute intracranial abnormality  I personally discussed this study with neurologist      CTA stroke alert (head/neck)    Result Date: 7/29/2022  Impression: Moderate to severe plaque stenosis bilateral cavernous ICA carotid siphon  4 mm right supraclinoid ICA aneurysm   I personally discussed this study with neurologist   ·     Incidental Findings:   · None    Test Results Pending at Discharge (will require follow up):    none    Outpatient Tests Requested:  · EMG nerve conduction tests    Complications:  none    Reason for Admission: none    Hospital Course:   Min Prabhakar is a 78 y o  female patient who originally presented to the hospital on 7/29/2022 due to arm weakness numbness and gait imbalance  In the ED patient presented with significant elevated blood pressure 237/200 decreased motor strength 4/5 in the right loupper extremity  A stroke alert was initiated and neurology was consulted  Patient did not receive tPA   Patient was loaded with aspirin and Plavix  Patient had a CT head that was negative for any acute infarct infarction or bleeding  Patient had a CTA head and neck that was unremarkable for large vessel occlusion however did reveal a 4 mm ICA aneurysm  MRI brain did not show any acute findings  Mild, chronic microangiopathy is similar to the previous study During hospitalization symptoms significantly improved  Patient is stable for discharge  Patient will be discharged on amlodipine 5 mg daily, aspirin 81 mg daily and atorvastatin 80 mg daily  Patient will be following up with neurology and neurosurgery  Patient will be following up with a PCP for management of elevated blood pressures  Please see above list of diagnoses and related plan for additional information  Condition at Discharge: stable    Discharge Day Visit / Exam:   * Please refer to separate progress note for these details *    Discussion with Family: Updated  (daughter) at bedside  Discharge instructions/Information to patient and family:   See after visit summary for information provided to patient and family  Provisions for Follow-Up Care:  See after visit summary for information related to follow-up care and any pertinent home health orders  Disposition:   Home    Planned Readmission: none    Discharge Medications:  See after visit summary for reconciled discharge medications provided to patient and/or family        **Please Note: This note may have been constructed using a voice recognition system**

## 2022-07-31 NOTE — DISCHARGE INSTR - AVS FIRST PAGE
Dear Anuel Velazquez,     It was our pleasure to care for you here at [facility]  It is our hope that we were always able to exceed the expected standards for your care during your stay  You were hospitalized due to Stroke like symptoms  You were cared for on the third  floor by Yohannes Jacobson MD under the service of Sade Malone MD with the [facility] who covers for your primary care physician (PCP), No primary care provider on file  , while you were hospitalized  If you have any questions or concerns related to this hospitalization, you may contact us at [phone]  For follow up as well as any medication refills, we recommend that you follow up with your primary care physician  A registered nurse will reach out to you by phone within a few days after your discharge to answer any additional questions that you may have after going home  However, at this time we provide for you here, the most important instructions / recommendations at discharge:     Notable Medication Adjustments -   Aspirin 81 mg has been added , take one tablet a day  Atorvastatin 80 mg has been added, take one tablet daily  Amlodipine 5 mg has been added  Please take 1 tablet daily  Testing Required after Discharge -   EMG nerve conduction  Important follow up information -   Please follow up with neurosurgery in four weeks for 4 mm ICA aneurysm  You will see Dr Franki Drummond at [facility]  Call to make an appointment if you do not hear from them in two weeks  Please follow up with neurology in 6 weeks at [facility]  Please follow up with a Primary care Provider to establish care   You can establish care at [facility] [place]   Other Instructions -   Continue with your medications as prescribed   Please review this entire after visit summary as additional general instructions including medication list, appointments, activity, diet, any pertinent wound care, and other additional recommendations from your care team that may be provided for you        Sincerely,     Ángela De La Torre MD

## 2022-07-31 NOTE — ASSESSMENT & PLAN NOTE
Patient presented with right-sided numbness, weakness, gait imbalance  Last known well night of 7/28/22 around 8:30PM   CT head negative for any acute infarction or bleeding  CTA head and neck negative for any large vessel occlusion, showed a 4 mm ICA aneurysm  MRI brain did not show any acute findings  Mild, chronic microangiopathy is similar to the previous study as is a small chronic left basal ganglia lacunar infarction  Patient was stroke alert  Case discussed with Neurology  Patient be admitted for stroke pathway  Patient loaded with aspirin Plavix per Neurology recommendations  Neurology consult and recommendation appreciated  Lipid Panel: total cholesterol 152  Hemoglobin A1c 5 6  TSH 4 246  Vitamin B12 2773    Plans:  Continue aspirin 81 mg,and atorvastatin 80 mg daily  Frequent neuro checks     Outpatient EMG nerve conduction test

## 2022-07-31 NOTE — ASSESSMENT & PLAN NOTE
4 mm right ICA aneurysm seen on CT head and neck  New for patient  Neurology and Neurosurgery was onboard    Plans:  Continue neurological check  Counseling on smoking , hypertension, and hyperlipidemia  Recommend close outpatient follow-up for blood pressure    Follow-up neurosurgery in outpatient setting in 4 weeks for further workup and evaluation of incidental aneurysms

## 2022-07-31 NOTE — ASSESSMENT & PLAN NOTE
Lipid panel with total cholesterol 152, , HDL 49  No home medication  Patient had received a dose of Lipitor 40 mg at ED    Since admission patient has been on 80 mg atorvastatin daily/    Plan  Continue the 80 mg of atorvastatin  Encouraged lifestyle modifications

## 2022-07-31 NOTE — ASSESSMENT & PLAN NOTE
Patient blood pressure was significantly elevated on admission  237/200  This morning BP is 157/93  Plan:  5 mg p o  Amlodipine has been added  Continue  Reassess response  On discharge, patient will continue aspirin 81 mg daily and atorvastatin 80 mg daily    Patient will need to establish care with a PCP for ongoing blood pressure management

## 2022-08-01 ENCOUNTER — TELEPHONE (OUTPATIENT)
Dept: INTERNAL MEDICINE CLINIC | Facility: CLINIC | Age: 77
End: 2022-08-01

## 2022-08-02 ENCOUNTER — TELEPHONE (OUTPATIENT)
Dept: NEUROSURGERY | Facility: CLINIC | Age: 77
End: 2022-08-02

## 2022-08-02 ENCOUNTER — OFFICE VISIT (OUTPATIENT)
Dept: INTERNAL MEDICINE CLINIC | Facility: CLINIC | Age: 77
End: 2022-08-02
Payer: MEDICARE

## 2022-08-02 VITALS
TEMPERATURE: 98.7 F | BODY MASS INDEX: 35.02 KG/M2 | RESPIRATION RATE: 20 BRPM | WEIGHT: 173.4 LBS | DIASTOLIC BLOOD PRESSURE: 80 MMHG | HEART RATE: 85 BPM | SYSTOLIC BLOOD PRESSURE: 120 MMHG | OXYGEN SATURATION: 97 %

## 2022-08-02 DIAGNOSIS — I67.1 INTRACRANIAL ANEURYSM: ICD-10-CM

## 2022-08-02 DIAGNOSIS — I63.20 CEREBROVASCULAR ACCIDENT (CVA) DUE TO OCCLUSION OF PRECEREBRAL ARTERY (HCC): ICD-10-CM

## 2022-08-02 DIAGNOSIS — I10 PRIMARY HYPERTENSION: Primary | ICD-10-CM

## 2022-08-02 PROCEDURE — 99204 OFFICE O/P NEW MOD 45 MIN: CPT | Performed by: INTERNAL MEDICINE

## 2022-08-02 RX ORDER — AMLODIPINE BESYLATE 5 MG/1
5 TABLET ORAL DAILY
Qty: 30 TABLET | Refills: 2 | Status: SHIPPED | OUTPATIENT
Start: 2022-08-02

## 2022-08-02 RX ORDER — ATORVASTATIN CALCIUM 80 MG/1
40 TABLET, FILM COATED ORAL EVERY EVENING
Qty: 30 TABLET | Refills: 2 | Status: SHIPPED | OUTPATIENT
Start: 2022-08-02

## 2022-08-02 RX ORDER — ASPIRIN 81 MG/1
81 TABLET ORAL DAILY
Qty: 30 TABLET | Refills: 2 | Status: SHIPPED | OUTPATIENT
Start: 2022-08-02

## 2022-08-02 RX ORDER — LISINOPRIL 5 MG/1
5 TABLET ORAL DAILY
Qty: 30 TABLET | Refills: 1 | Status: SHIPPED | OUTPATIENT
Start: 2022-08-02 | End: 2022-09-29

## 2022-08-02 NOTE — PROGRESS NOTES
INTERNAL MEDICINE TRANSITION OF CARE OFFICE VISIT  [facility] INTERNAL MEDICINE [facility]    NAME: Ese Mclean  AGE: 78 y o  SEX: female  : 1945     DATE: 2022     Assessment and Plan:     Problem List Items Addressed This Visit        Cardiovascular and Mediastinum    Cerebrovascular accident (CVA) due to occlusion of precerebral artery ([address] )     Recent hospitalization for CVA, discharged 22  DC on Amlodipine, ASA, Atorvastatin   Hx TIA 7 years ago, was not taking any home medication since   Residual R arm and R leg weakness upon examination, improving since discharge     Plan-  Follow up with Neurology   PT/OT   Continue taking ASA and Statin               Relevant Medications    atorvastatin (LIPITOR) 80 mg tablet    aspirin (ECOTRIN LOW STRENGTH) 81 mg EC tablet    Other Relevant Orders    Ambulatory Referral to Physical Therapy    Ambulatory Referral to Occupational Therapy    Ambulatory Referral to Neurology    Primary hypertension - Primary     Daughter has been taking BP at home and mentioned a systolicrange of 153-643, noted that the BP is usually elevated at night  Patient takes Amlodipine HS    Plan-  Added Lisinopril 5mg daily   Continue Amlodipine 5mg daily   Continue taking BP 3x/day              Relevant Medications    lisinopril (ZESTRIL) 5 mg tablet    amLODIPine (NORVASC) 5 mg tablet    Intracranial aneurysm     Recent hospitalization for CVA 22  CTA H/N Incidental finding - Moderate to severe plaque stenosis bilateral cavernous ICA carotid siphon  4 mm right supraclinoid ICA aneurysm      Plan-  Follow up with Neurosurgery  Continue Amlodipine, ASA, Statin, Lisinopril            Relevant Orders    Ambulatory Referral to Neurosurgery           Counseling:     · Medication Side Effects: Adverse side effects of medications were reviewed with the patient/guardian today    · I have spent 30 minutes with Patient and family today in which [facility] than 50% of this time was spent in counseling/coordination of care regarding Prognosis, Risks and benefits of tx options, Intructions for management, Patient and family education, Importance of tx compliance and Risk factor reductions  · Barriers to treatment include: No identified barriers     Transitional Care Management Review:     Sari Theodore is a 78 y o  female here for TCM follow-up    During the TCM phone call patient stated:    TCM Call     None      TCM Call     None           HPI:     Ms Teresa Savage is a 79 y/o F with a PMH of TIA,  who presents with daughter who was translating for patient, presented to the office for transition of care management  Patient was recently discharged from the hospital due to a CVA  During hospitalization CT head, CTA H/N, and MRI Brain were all negative for acute intracranial abnormalities  During hospital stay patient was hypertensive as well  Upon discharge, patient was prescribed Amlodipine, ASA, and Statin  Patient's daughter reported that she had been taking her mother's BP 3x/day and had noticed that the systolic pressures were ranging from 140-170  Patient's daughter also reported that the higher BP readings occur at night, which was when she took her BP meds  Patient reported improving R arm and R leg mobility and strength since hospitalization  Patient denied dizziness, HA, vision changes, new weakness, numbness, difficulty speaking, CP, SOB, palpitations, or any other sx at this time  The following portions of the patient's history were reviewed and updated as appropriate: allergies, current medications, past family history, past medical history, past social history, past surgical history and problem list      Review of Systems:     Review of Systems   Constitutional: Negative for chills and fever  HENT: Negative for ear pain and sore throat  Eyes: Negative for pain and visual disturbance     Respiratory: Negative for cough, chest tightness, shortness of breath and wheezing  Cardiovascular: Negative for chest pain and palpitations  Gastrointestinal: Negative for abdominal pain, diarrhea, nausea and vomiting  Genitourinary: Negative for dysuria and hematuria  Musculoskeletal: Negative for arthralgias and back pain  Skin: Negative for color change and rash  Neurological: Positive for weakness  Negative for dizziness, seizures, syncope, facial asymmetry, speech difficulty, light-headedness, numbness and headaches  Psychiatric/Behavioral: Negative for confusion  All other systems reviewed and are negative  Problem List:     Patient Active Problem List   Diagnosis    Weakness of right hand    Hypertensive urgency    Intracranial aneurysm    Hyperlipidemia    Primary hypertension    Cerebrovascular accident (CVA) due to occlusion of precerebral artery (HCC)        Objective:     /80   Pulse 85   Temp 98 7 °F (37 1 °C)   Resp 20   Wt 78 7 kg (173 lb 6 4 oz)   SpO2 97%   BMI 35 02 kg/m²     Physical Exam  Constitutional:       General: She is not in acute distress  Appearance: Normal appearance  She is not ill-appearing or toxic-appearing  HENT:      Head: Normocephalic and atraumatic  Mouth/Throat:      Mouth: Mucous membranes are moist    Eyes:      Extraocular Movements: Extraocular movements intact  Pupils: Pupils are equal, round, and reactive to light  Cardiovascular:      Rate and Rhythm: Normal rate and regular rhythm  Pulses: Normal pulses  Heart sounds: Normal heart sounds  No murmur heard  No gallop  Pulmonary:      Effort: Pulmonary effort is normal  No respiratory distress  Breath sounds: Normal breath sounds  No wheezing  Chest:      Chest wall: No tenderness  Abdominal:      General: Bowel sounds are normal  There is no distension  Palpations: Abdomen is soft  Tenderness: There is no abdominal tenderness     Musculoskeletal:         General: No swelling or tenderness  Normal range of motion  Cervical back: Normal range of motion  Right lower leg: No edema  Left lower leg: No edema  Skin:     General: Skin is warm  Neurological:      General: No focal deficit present  Mental Status: She is alert and oriented to person, place, and time  Cranial Nerves: No cranial nerve deficit  Sensory: No sensory deficit  Motor: Weakness present  Coordination: Coordination normal       Gait: Gait normal       Comments: R arm and R Leg residual weakness  Improving since discharge    Psychiatric:         Mood and Affect: Mood normal          Behavior: Behavior normal          Laboratory Results: I have personally reviewed the pertinent laboratory results/reports     Radiology/Other Diagnostic Testing Results: I have personally reviewed pertinent reports  MRI brain wo contrast    Result Date: 7/30/2022  MRI BRAIN WITHOUT CONTRAST INDICATION: Stroke-like symptoms  Right-sided numbness  Imbalance  Weakness  COMPARISON:   3/9/2015; 7/29/2022; H&P 7/29/2022 TECHNIQUE:  Sagittal T1, axial T2, axial FLAIR, axial T1, axial [place] and axial diffusion imaging  IMAGE QUALITY:  Diagnostic  FINDINGS: BRAIN PARENCHYMA:  There is no discrete mass, mass effect or midline shift  There is no intracranial hemorrhage  There is no evidence of acute infarction and diffusion imaging is unremarkable  Small scattered hyperintensities on T2/FLAIR imaging are noted in the periventricular and subcortical white matter demonstrating an appearance that is statistically most likely to represent mild microangiopathic change  Chronic left basal ganglia lacunar infarction redemonstrated with peripheral hemosiderin staining indicative of old hemorrhagic lacunar infarction  No acute intracranial hemorrhage currently  VENTRICLES:  Mild ex vacuo dilatation left lateral ventricle   SELLA AND PITUITARY GLAND:  Normal  ORBITS:  Normal  PARANASAL SINUSES:  Normal  VASCULATURE: Evaluation of the major intracranial vasculature demonstrates appropriate flow voids  CALVARIUM AND SKULL BASE:  Normal  EXTRACRANIAL SOFT TISSUES:  Normal      1   No acute infarction, intracranial hemorrhage or mass effect  2   Mild, chronic microangiopathy is similar to the previous study as is a small chronic left basal ganglia lacunar infarction  Workstation performed: [id number]     CT stroke alert brain    Result Date: 7/29/2022  CT BRAIN - STROKE ALERT PROTOCOL INDICATION:   Stroke Alert  COMPARISON:  None  TECHNIQUE:  CT examination of the brain was performed  In addition to axial images, coronal reformatted images were created and submitted for interpretation  Radiation dose length product (DLP) for this visit:  861 mGy-cm   This examination, like all CT scans performed in the [facility], was performed utilizing techniques to minimize radiation dose exposure, including the use of iterative reconstruction and automated exposure control  IMAGE QUALITY:  Diagnostic  FINDINGS:  PARENCHYMA:  No intracranial mass, mass effect or midline shift  No CT signs of acute infarction  No acute parenchymal hemorrhage  Old left basal ganglia/anterior limb internal capsule infarct  VENTRICLES AND EXTRA-AXIAL SPACES:  Normal for patient's age  VISUALIZED ORBITS AND PARANASAL SINUSES:  Unremarkable  CALVARIUM AND EXTRACRANIAL SOFT TISSUES:   Normal      No acute intracranial abnormality  I personally discussed this study with neurologist  on 7/29/2022 at 5:28 PM  Workstation performed: [id number]     CTA stroke alert (head/neck)    Result Date: 7/29/2022  CTA NECK AND BRAIN WITH CONTRAST INDICATION: Stroke Alert COMPARISON:   None  TECHNIQUE:   Post contrast imaging was performed after administration of iodinated contrast through the neck and brain  Post contrast axial 0 625 mm images timed to opacify the arterial system  3D rendering was performed on an independent workstation     MIP reconstructions performed  Coronal reconstructions were performed of the noncontrast portion of the brain  Radiation dose length product (DLP) for this visit:  461 mGy-cm   This examination, like all CT scans performed in the [facility], was performed utilizing techniques to minimize radiation dose exposure, including the use of iterative reconstruction and automated exposure control  IV Contrast:  85 mL of iohexol (OMNIPAQUE)  IMAGE QUALITY:   Diagnostic FINDINGS: CERVICAL VASCULATURE AORTIC ARCH AND GREAT VESSELS:  Mild atherosclerotic disease of the arch, proximal great vessels and visualized subclavian vessels  No significant stenosis  RIGHT VERTEBRAL ARTERY CERVICAL SEGMENT:  Normal origin  The vessel is normal in caliber throughout the neck  LEFT VERTEBRAL ARTERY CERVICAL SEGMENT:  Normal origin  The vessel is normal in caliber throughout the neck  RIGHT EXTRACRANIAL CAROTID SEGMENT:  Normal caliber common carotid artery  Normal bifurcation and cervical internal carotid artery  No stenosis or dissection  LEFT EXTRACRANIAL CAROTID SEGMENT:  Normal caliber common carotid artery  Normal bifurcation and cervical internal carotid artery  No stenosis or dissection  NASCET criteria was used to determine the degree of internal carotid artery diameter stenosis  INTRACRANIAL VASCULATURE INTERNAL CAROTID ARTERIES:  Moderate to severe plaque stenosis bilateral cavernous carotid siphon  4 mm right supraclinoid ICA aneurysm  Normal ophthalmic artery origins  Normal ICA terminus  ANTERIOR CIRCULATION:  Symmetric A1 segments and anterior cerebral arteries with normal enhancement  Normal anterior communicating artery  MIDDLE CEREBRAL ARTERY CIRCULATION:  M1 segment and middle cerebral artery branches demonstrate normal enhancement bilaterally  DISTAL VERTEBRAL ARTERIES:  Normal distal vertebral arteries  Posterior inferior cerebellar artery origins are normal  Normal vertebral basilar junction   BASILAR ARTERY: Basilar artery is normal in caliber  Normal superior cerebellar arteries  POSTERIOR CEREBRAL ARTERIES: Both posterior cerebral arteries arises from the basilar tip  Both arteries demonstrate normal enhancement  Normal posterior communicating arteries  VENOUS STRUCTURES:  Normal  NON VASCULAR ANATOMY BONY STRUCTURES:  No acute osseous abnormality  SOFT TISSUES OF THE NECK:  Normal  THORACIC INLET:  Upper lobe scarring near the apex can be correlated with nonemergent outpatient unenhanced chest CT in 12 months  Moderate to severe plaque stenosis bilateral cavernous ICA carotid siphon  4 mm right supraclinoid ICA aneurysm  I personally discussed this study with neurologist  on 7/29/2022 at 5:28 PM  Workstation performed: [id number]     Echo complete w/ contrast if indicated    Result Date: 7/31/2022    Left Ventricle: Left ventricular cavity size is normal  Wall thickness is upper normal  The left ventricular ejection fraction is 65%  Systolic function is normal  Wall motion is normal  Diastolic function is mildly abnormal, consistent with grade I (abnormal) relaxation    Left Atrium: The atrium is mildly dilated    Aortic Valve: There is mild to moderate regurgitation    Mitral Valve: There is trace regurgitation    Tricuspid Valve: There is mild regurgitation    Pulmonary Artery: The pulmonary artery systolic pressure is normal         Current Medications:     Outpatient Medications Prior to Visit   Medication Sig Dispense Refill    amLODIPine (NORVASC) 5 mg tablet Take 1 tablet (5 mg total) by mouth daily 30 tablet 0    aspirin (ECOTRIN LOW STRENGTH) 81 mg EC tablet Take 1 tablet (81 mg total) by mouth daily 30 tablet 0    atorvastatin (LIPITOR) 80 mg tablet Take 0 5 tablets (40 mg total) by mouth every evening 30 tablet 0     No facility-administered medications prior to visit         Fany Noble MD  [facility] INTERNAL MEDICINE [facility]

## 2022-08-02 NOTE — TELEPHONE ENCOUNTER
8/2/22:   DISCHARGED HOME  CONFIRMED OV W/ DAUGHTER DUSTIN    2 WK HFU SNPX W/ TAYLER      8/31/22 / 11:00 / [place]    IMAGING:  Martita Haider

## 2022-08-02 NOTE — TELEPHONE ENCOUNTER
08/02/2022-PT DISCHARGED TO HOME  TELEPHONE NOTE AND IN BASKET MESSAGE SENT TO NEYMAR TO SCHEDULE, SINCE PT SEEN IN [facility]          ----- Message from Elizabet Neil RN sent at 8/1/2022  8:33 AM EDT -----    ----- Message -----  From: MONIQUE Flynn  Sent: 7/30/2022  12:17 PM EDT  To: Neurosurgical [facility]    Patient needs post hospital follow-up in 4 weeks [facility]

## 2022-08-02 NOTE — ASSESSMENT & PLAN NOTE
Recent hospitalization for CVA, discharged 7/31/22  DC on Amlodipine, ASA, Atorvastatin   Hx TIA 7 years ago, was not taking any home medication since   Residual R arm and R leg weakness upon examination, improving since discharge     Plan-  Follow up with Neurology   PT/OT   Continue taking ASA and Statin

## 2022-08-02 NOTE — ASSESSMENT & PLAN NOTE
Recent hospitalization for CVA 7/31/22  CTA H/N Incidental finding - Moderate to severe plaque stenosis bilateral cavernous ICA carotid siphon   4 mm right supraclinoid ICA aneurysm      Plan-  Follow up with Neurosurgery  Continue Amlodipine, ASA, Statin, Lisinopril

## 2022-08-02 NOTE — ASSESSMENT & PLAN NOTE
Daughter has been taking BP at home and mentioned a systolicrange of 330-442, noted that the BP is usually elevated at night  Patient takes Amlodipine HS    Plan-  Added Lisinopril 5mg daily   Continue Amlodipine 5mg daily   Continue taking BP 3x/day

## 2022-08-03 ENCOUNTER — TELEPHONE (OUTPATIENT)
Dept: NEUROLOGY | Facility: CLINIC | Age: 77
End: 2022-08-03

## 2022-08-03 NOTE — TELEPHONE ENCOUNTER
SLAN/WEAKNESS OF RIGHT HAND            NOTES:Ximena Sealsgabrielabnersabrinaas will need follow up in in 6 weeks with general attending or advance practitioner  She will require a EMG/NCS within 12 weeks  SCHEDULED: 9/21/22 @ 12:45PM W/NATI Barnett IN [place]  ADDED PATIENT TO THE WAIT LIST  WILL SEND PATIENT A LETTER W/APPOINTMENT DETAILS/DIRECTIONS

## 2022-08-12 ENCOUNTER — EVALUATION (OUTPATIENT)
Dept: OCCUPATIONAL THERAPY | Facility: CLINIC | Age: 77
End: 2022-08-12
Payer: MEDICARE

## 2022-08-12 DIAGNOSIS — I63.20 CEREBROVASCULAR ACCIDENT (CVA) DUE TO OCCLUSION OF PRECEREBRAL ARTERY (HCC): Primary | ICD-10-CM

## 2022-08-12 PROCEDURE — 97166 OT EVAL MOD COMPLEX 45 MIN: CPT | Performed by: OCCUPATIONAL THERAPIST

## 2022-08-12 NOTE — PROGRESS NOTES
OCCUPATIONAL THERAPY INITIAL EVALUATION    Today's Date: 2022  Patient Name: Jessica Stone  :   MRN: [id number]  Referring Provider: Won Pretty MD  Dx: Cerebrovascular accident (CVA) due to occlusion of precerebral artery ([address] ) [I63 20]    SKILLED ANALYSIS:  Pt is a R hand dominant 78year old female presenting to OP OT s/p CVA  Pt currently requires assistance with IADLs, bathing, mobility d/t R hemiparesis, balance deficits, motor control deficits  Deficits are noted based on the following assessments: 9 hole peg test, functional dexterity test, MMT, MAS, STANLEY  Recommend OP OT 2-3x/wk for 8-12 weeks with focus on aforementioned deficits to maximize functional recovery s/p CVA and improve QOL  Findings and recommendations discussed with pt and daughter Indu Ngo), and they are in agreement  Educated daughter on allowing pt to participate in seated activities such as folding laundry to increase RUE use, and on recommendation for tub bench or shower chair depending on setup in bathroom to decrease fall risk  She indicated understanding  Next session assess sensation, proprioception, MoCA, Trail Making  PLAN OF CARE START:22  PLAN OF CARE END: 22  FREQUENCY: 2-3x/wk  PRECAUTIONS Portuguese speaking, falls, mod severity b/l cataracts    Subjective  Pt's daughter reports that pt is still experiencing decreased RUE and RLE strength, cramps up when walking community distances (1-2 blocks), loses balance  Juancho with dressing, has had LOB when standing to bathe, Juancho bathing  Normally does homemaking tasks, but daughter isn't letting her at this time  Lives with daughter and most likely will stay with daughter long-term  Mechanism of Injury  Anuel Velazquez is a 78 y o  female patient who originally presented to the hospital on 2022 due to arm weakness numbness and gait imbalance    In the ED patient presented with significant elevated blood pressure 237/200 decreased motor strength 4/5 in the right loupper extremity  A stroke alert was initiated and neurology was consulted  Patient did not receive tPA   Patient was loaded with aspirin and Plavix  Patient had a CT head that was negative for any acute infarct infarction or bleeding  Patient had a CTA head and neck that was unremarkable for large vessel occlusion however did reveal a 4 mm ICA aneurysm  MRI brain did not show any acute findings, did show chronic small L basal ganglia lacunar infarct  Mild, chronic microangiopathy is similar to the previous study During hospitalization symptoms significantly improved    Occupational Profile  Recently moved to the [address] [place] [address] [address] from [facility]  Lives with her daughter, Dewey Chowdary  Had vision checked just before TIA and was diagnosed with moderate cataracts  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  Enjoys taking the family dog for walks  PATIENT GOAL: "The right side, the strength in my right hand and leg  I feel like I fall to the right side "  Wants to be able to go for walks with the dog  HISTORY OF PRESENT ILLNESS:     Pt is a 78 y o  female who was referred to Occupational Therapy s/p  Cerebrovascular accident (CVA) due to occlusion of precerebral artery ([address] ) [I63 20]  PMH: No past medical history on file  Past Surgical Hx: No past surgical history on file  Pain:  Location:     Restin    With Activity:  5/10 from low back down RLE when walking    Objective    Upper Extremities:  Pt is right hand dominant                STANLEY: RUE: 25 3/200 LUE: 34 6/200  The age norm is approximately 42 lbs R, 37 lbs L lbs and indicating decreased  strength                  Range of Motion: [facility]     Manual Muscle Testing:  R UE:  - Shoulder flexion: 3-/5  - Elbow flexion: 4-/5  - Elbow extension: 3+/5  - Wrist flexion: 3/5  - Wrist extension: 3/5  L UE:  - Shoulder flexion: 4/5  - Elbow flexion: 4/5  - Elbow extension: 4/5  - Wrist flexion: 5/5  - Wrist extension: 5/5       NINE-HOLE PEG TEST: assessing dexterity/fine motor coordination with pt scoring 32 7 seconds on R hand and 33 7 seconds on L hand side  Pt demonstrating decreased [address] related to age-related norms (age R 25 seconds, L 24)  Scores indicate decreased b/l [address] however may have been affected by vision deficits  Functional Dexterity Test: assesses patient's ability to use the hand for daily tasks requiring a 3-jaw jose m prehension between the fingers and the thumb     R UE: 28 7 seconds  L UE: 29 9 seconds   Norms for age/sex: dominant hand 37 seconds, non-dominant hand 38 seconds  Scores indicate dexterity WNL for age      Modified Iain Scale: measures spasticity in patients with lesions in the Central Nervous System  R UE: 0/4 at elbow, wrist, FA    0: No increase in muscle tone  1: Slight increase in muscle tone, manifested by a catch and release or by minimal resistance at the end of the range of motion when the affected part(s) is moved in flexion or extension  1+: Slight increase in muscle tone, manifested by a catch, followed by minimal resistance throughout the remainder (less than half) of the ROM  2: More marked increase in muscle tone through most of the ROM, but affected part(s) easily moved  3: Considerable increase in muscle tone, passive movement difficult  4: Affected part(s) rigid in flexion or extension    Subluxation: not assessed    No scapular winging noted     PROPRIOCEPTION:  not tested d/t time constraints    Finger to Nose Testing without visual occlusion: not tested d/t time constraints    Monofilaments/sensory testing: not tested d/t time constraints    Functional Cognition:  Highest level of education: [employer] Cognitive Assessment Version 8 1 (MoCA V8 1)  Visuospatial/executive functioning:  /5  Naming:  /3  Memory: 1st trial:  /5, 2nd trial:  /5  Attention/concentration: /2  List of letters: /1  Serial Seven Subtraction:  /3 w/  errors  Language/sentence repetition: /2  Language Fluency:  /1  Abstract/Correlational Thinking:  /2  Delayed Recall:  /5  Orientation:  /6   Memory Index Score: /15  MoCA V1 8 1 Raw Score:  /30, MIS:  /15, indicative of  neurocognitive impairments  MoCA Scoring        Normal: 26+         Mild Cognitive Impairment: 18-25          Moderate Cognitive Impairment: 10-17         Severe Cognitive Impairment: <10    Trail making Part A and Part B:   Part A:  with    Part B:  with  VCs for recall of instructions, problem solving,   Indicating deficits: Part A deficit >  seconds and Part B deficit >  seconds      Contextual Memory Test:  Immediate: /20,  confabulations  Delayed: /20,  confabulations     GOALS:   Short Term Goals:  Pt will increase BUE  strength by 3 lbs to complete IADLs   Pt will increase b/l FMC by 3 seconds on 9 hole peg to complete ADLs and IADLs   Pt will increase RUE strength by 1 level for MMT at all pivots in order to complete ADLs/IADLs  Pt will participate in simulated homemaking tasks with distant supervision based on improved standing balance in order to complete IADLs  Long Term Goals: 8-12 weeks  Pt will increase BUE  strength by 7 lbs to complete IADLs   Pt will increase b/l FMC by 8 seconds on 9 hole peg to complete ADLs and IADLs   Pt will increase RUE strength to 4+ to 5/5 at all pivots in order to complete ADLs/IADLs  Pt will participate in simulated homemaking tasks independently based on improved standing balance in order to complete IADLs        OTHER PLANNED THERAPY INTERVENTIONS:   Supine, seated, and in stance neuro re-ed  Tricep AG  NMES/FES  FMC/prehension  Timed Trials  Manual tx  Hand to target  Sensory re-ed  Seated functional reach: crossing midline  Supine place and hold  WBearing strategies   Closed chain activities  Open chain activities  Internal and external memory aides  Multimatrix for saccades/ visual clutter/attention  Hypersensitivity strategies education  Multi-modal environment  Sustained/alternating/divided attention  Tracking tube  Oculomotor control:  saccades, con/divergence  Conv /div   Dynamic tasks  Work stations with timed transitions  Temporal Awareness  Memory and mental manipulation  Auditory processing with immediate recall  Memory retention with immediate and delayed recall  Edu on cog/vision apps      Outcome Measures EVAL  PN  PN PN PN PN   Fugyl carmichael         STANLEY    2 pinch  Lateral  3 pinch R 25 3  L 34 6        9 hole peg test R 32 7  L 33 7        Functional dexterity R 28 7  L 29 9        MOCA Score only          Trail making Part A  Part B

## 2022-08-16 ENCOUNTER — APPOINTMENT (OUTPATIENT)
Dept: LAB | Facility: CLINIC | Age: 77
End: 2022-08-16
Payer: MEDICARE

## 2022-08-16 ENCOUNTER — TELEPHONE (OUTPATIENT)
Dept: INTERNAL MEDICINE CLINIC | Facility: CLINIC | Age: 77
End: 2022-08-16

## 2022-08-16 ENCOUNTER — OFFICE VISIT (OUTPATIENT)
Dept: INTERNAL MEDICINE CLINIC | Facility: CLINIC | Age: 77
End: 2022-08-16
Payer: MEDICARE

## 2022-08-16 VITALS
WEIGHT: 171.6 LBS | TEMPERATURE: 98 F | OXYGEN SATURATION: 97 % | BODY MASS INDEX: 34.6 KG/M2 | HEART RATE: 89 BPM | HEIGHT: 59 IN | DIASTOLIC BLOOD PRESSURE: 74 MMHG | SYSTOLIC BLOOD PRESSURE: 120 MMHG

## 2022-08-16 DIAGNOSIS — E66.9 OBESITY (BMI 30.0-34.9): ICD-10-CM

## 2022-08-16 DIAGNOSIS — Z13.820 SCREENING FOR OSTEOPOROSIS: ICD-10-CM

## 2022-08-16 DIAGNOSIS — N39.41 URGE INCONTINENCE: ICD-10-CM

## 2022-08-16 DIAGNOSIS — Z78.0 ASYMPTOMATIC MENOPAUSAL STATE: ICD-10-CM

## 2022-08-16 DIAGNOSIS — Z00.00 WELCOME TO MEDICARE PREVENTIVE VISIT: ICD-10-CM

## 2022-08-16 DIAGNOSIS — Z12.11 SCREEN FOR COLON CANCER: ICD-10-CM

## 2022-08-16 DIAGNOSIS — Z00.00 MEDICARE ANNUAL WELLNESS VISIT, INITIAL: Primary | ICD-10-CM

## 2022-08-16 DIAGNOSIS — R35.0 URINARY FREQUENCY: ICD-10-CM

## 2022-08-16 DIAGNOSIS — N30.01 ACUTE CYSTITIS WITH HEMATURIA: Primary | ICD-10-CM

## 2022-08-16 LAB
BACTERIA UR QL AUTO: ABNORMAL /HPF
BILIRUB UR QL STRIP: NEGATIVE
CAOX CRY URNS QL MICRO: ABNORMAL /HPF
CLARITY UR: ABNORMAL
COLOR UR: YELLOW
GLUCOSE UR STRIP-MCNC: NEGATIVE MG/DL
HGB UR QL STRIP.AUTO: ABNORMAL
HYALINE CASTS #/AREA URNS LPF: ABNORMAL /LPF
KETONES UR STRIP-MCNC: NEGATIVE MG/DL
LEUKOCYTE ESTERASE UR QL STRIP: ABNORMAL
MUCOUS THREADS UR QL AUTO: ABNORMAL
NITRITE UR QL STRIP: POSITIVE
NON-SQ EPI CELLS URNS QL MICRO: ABNORMAL /HPF
PH UR STRIP.AUTO: 5.5 [PH]
PROT UR STRIP-MCNC: ABNORMAL MG/DL
RBC #/AREA URNS AUTO: ABNORMAL /HPF
SP GR UR STRIP.AUTO: 1.02 (ref 1–1.03)
UROBILINOGEN UR STRIP-ACNC: <2 MG/DL
WBC #/AREA URNS AUTO: ABNORMAL /HPF
WBC CLUMPS # UR AUTO: PRESENT /UL

## 2022-08-16 PROCEDURE — 87186 SC STD MICRODIL/AGAR DIL: CPT

## 2022-08-16 PROCEDURE — 87077 CULTURE AEROBIC IDENTIFY: CPT

## 2022-08-16 PROCEDURE — G0438 PPPS, INITIAL VISIT: HCPCS | Performed by: INTERNAL MEDICINE

## 2022-08-16 PROCEDURE — 81001 URINALYSIS AUTO W/SCOPE: CPT

## 2022-08-16 PROCEDURE — 87086 URINE CULTURE/COLONY COUNT: CPT

## 2022-08-16 RX ORDER — NITROFURANTOIN 25; 75 MG/1; MG/1
100 CAPSULE ORAL 2 TIMES DAILY
Qty: 10 CAPSULE | Refills: 0 | Status: SHIPPED | OUTPATIENT
Start: 2022-08-16 | End: 2022-08-21

## 2022-08-16 RX ORDER — OXYBUTYNIN CHLORIDE 5 MG/1
5 TABLET, EXTENDED RELEASE ORAL
Qty: 30 TABLET | Refills: 1 | Status: SHIPPED | OUTPATIENT
Start: 2022-08-16

## 2022-08-16 NOTE — TELEPHONE ENCOUNTER
[facility] pharmacist Clinton Hathaway has callled the [facility] office in regards to [employer]  Clinton Hathaway stated the pharmacy has received the prescription for oxybutynin (DITROPAN-XL) 5 mg 24 hr tablet  Clinton Hathaway stated with the tablet being cut in half the medication won't last 24 hours  Clinton Hathaway stated he would need to speak to the prescribing provider in regards to the oxybutynin   Clinton Hathaway stated the best phone number to reach him would be the [phone]

## 2022-08-16 NOTE — PROGRESS NOTES
Assessment and Plan:     Problem List Items Addressed This Visit    None          Preventive health issues were discussed with patient, and age appropriate screening tests were ordered as noted in patient's After Visit Summary  Personalized health advice and appropriate referrals for health education or preventive services given if needed, as noted in patient's After Visit Summary  History of Present Illness:     Patient presents for a Medicare Wellness Visit    HPI   Patient Care Team:  Amber Garcia MD as PCP - General (Internal Medicine)     Review of Systems:     Review of Systems     Problem List:     Patient Active Problem List   Diagnosis    Weakness of right hand    Hypertensive urgency    Intracranial aneurysm    Hyperlipidemia    Primary hypertension    Cerebrovascular accident (CVA) due to occlusion of precerebral artery [facility])      Past Medical and Surgical History:     History reviewed  No pertinent past medical history  History reviewed  No pertinent surgical history  Family History:     History reviewed  No pertinent family history     Social History:     Social History     Socioeconomic History    Marital status: Single     Spouse name: None    Number of children: None    Years of education: None    Highest education level: None   Occupational History    None   Tobacco Use    Smoking status: Never Smoker    Smokeless tobacco: Never Used   Vaping Use    Vaping Use: Never used   Substance and Sexual Activity    Alcohol use: Never    Drug use: Never    Sexual activity: Not Currently   Other Topics Concern    None   Social History Narrative    None     Social Determinants of Health     Financial Resource Strain: Not on file   Food Insecurity: Not on file   Transportation Needs: Not on file   Physical Activity: Not on file   Stress: Not on file   Social Connections: Not on file   Intimate Partner Violence: Not on file   Housing Stability: Not on file      Medications and Allergies:     Current Outpatient Medications   Medication Sig Dispense Refill    amLODIPine (NORVASC) 5 mg tablet Take 1 tablet (5 mg total) by mouth daily 30 tablet 2    aspirin (ECOTRIN LOW STRENGTH) 81 mg EC tablet Take 1 tablet (81 mg total) by mouth daily 30 tablet 2    atorvastatin (LIPITOR) 80 mg tablet Take 0 5 tablets (40 mg total) by mouth every evening 30 tablet 2    lisinopril (ZESTRIL) 5 mg tablet Take 1 tablet (5 mg total) by mouth daily 30 tablet 1     No current facility-administered medications for this visit  No Known Allergies   Immunizations: There is no immunization history on file for this patient  Health Maintenance:         Topic Date Due    Hepatitis C Screening  Never done    Colorectal Cancer Screening  Never done         Topic Date Due    COVID-19 Vaccine (1) Never done    Pneumococcal Vaccine: 65+ Years (1 - PCV) Never done    Influenza Vaccine (1) 09/01/2022      Medicare Screening Tests and Risk Assessments:     Raghu Perea is here for her Initial Wellness visit  Last Medicare Wellness visit information reviewed, patient interviewed and updates made to the record today  Health Risk Assessment:   Patient rates overall health as good  Patient feels that their physical health rating is same  Patient is satisfied with their life  Eyesight was rated as same  Hearing was rated as slightly worse  Patient feels that their emotional and mental health rating is same  Patients states they are never, rarely angry  Patient states they are often unusually tired/fatigued  Pain experienced in the last 7 days has been none  Patient states that she has experienced no weight loss or gain in last 6 months  Fall Risk Screening: In the past year, patient has experienced: no history of falling in past year      Urinary Incontinence Screening:   Patient has not leaked urine accidently in the last six months       Home Safety:  Patient does not have trouble with stairs inside or outside of their home  Patient has working smoke alarms and has no working carbon monoxide detector  Home safety hazards include: none  Nutrition:   Current diet is Regular  Medications:   Patient is currently taking over-the-counter supplements  OTC medications include: Vitamin B12, Vitamin B3, Multivitamin  Patient is able to manage medications  Activities of Daily Living (ADLs)/Instrumental Activities of Daily Living (IADLs):   Walk and transfer into and out of bed and chair?: Yes  Dress and groom yourself?: Yes    Bathe or shower yourself?: Yes    Feed yourself? Yes  Do your laundry/housekeeping?: No  Manage your money, pay your bills and track your expenses?: No  Make your own meals?: No    Do your own shopping?: No    Previous Hospitalizations:   Any hospitalizations or ED visits within the last 12 months?: Yes    How many hospitalizations have you had in the last year?: 1-2    Cognitive Screening:   Provider or family/friend/caregiver concerned regarding cognition?: No    PREVENTIVE SCREENINGS      Cardiovascular Screening:    General: Screening Not Indicated, History Lipid Disorder and Screening Current      Diabetes Screening:     General: Screening Current      Colorectal Cancer Screening:     General: Risks and Benefits Discussed    Due for: Colonoscopy - Low Risk      Breast Cancer Screening:     General: Risks and Benefits Discussed      Cervical Cancer Screening:    General: Screening Not Indicated      Osteoporosis Screening:    General: Risks and Benefits Discussed    Due for: DXA Axial      Abdominal Aortic Aneurysm (AAA) Screening:        General: Screening Not Indicated      Lung Cancer Screening:     General: Screening Not Indicated    Screening, Brief Intervention, and Referral to Treatment (SBIRT)    Screening  Typical number of drinks in a day: 0  Typical number of drinks in a week: 0  Interpretation: Low risk drinking behavior      Single Item Drug Screening:  How often have you used an illegal drug (including marijuana) or a prescription medication for non-medical reasons in the past year? never    Single Item Drug Screen Score: 0  Interpretation: Negative screen for possible drug use disorder    Other Counseling Topics:   Car/seat belt/driving safety, skin self-exam, sunscreen and calcium and vitamin D intake and regular weightbearing exercise       No exam data present     Physical Exam:     /74 (BP Location: Left arm, Patient Position: Sitting, Cuff Size: Standard)   Pulse 89   Temp 98 °F (36 7 °C) (Tympanic)   Ht 4' 11" (1 499 m)   Wt 77 8 kg (171 lb 9 6 oz)   SpO2 97%   BMI 34 66 kg/m²     Physical Exam     Nasir Hyatt, DO

## 2022-08-16 NOTE — PATIENT INSTRUCTIONS
Medicare Preventive Visit Patient Instructions  Thank you for completing your Welcome to Medicare Visit or Medicare Annual Wellness Visit today  Your next wellness visit will be due in one year (8/17/2023)  The screening/preventive services that you may require over the next 5-10 years are detailed below  Some tests may not apply to you based off risk factors and/or age  Screening tests ordered at today's visit but not completed yet may show as past due  Also, please note that scanned in results may not display below  Preventive Screenings:  Service Recommendations Previous Testing/Comments   Colorectal Cancer Screening  * Colonoscopy    * Fecal Occult Blood Test (FOBT)/Fecal Immunochemical Test (FIT)  * Fecal DNA/Cologuard Test  * Flexible Sigmoidoscopy Age: 39-70 years old   Colonoscopy: every 10 years (may be performed more frequently if at higher risk)  OR  FOBT/FIT: every 1 year  OR  Cologuard: every 3 years  OR  Sigmoidoscopy: every 5 years  Screening may be recommended earlier than age 39 if at higher risk for colorectal cancer  Also, an individualized decision between you and your healthcare provider will decide whether screening between the ages of 74-80 would be appropriate  Colonoscopy: Not on file  FOBT/FIT: Not on file  Cologuard: Not on file  Sigmoidoscopy: Not on file    Risks and Benefits Discussed  Due for Colonoscopy - Low Risk     Breast Cancer Screening Age: 36 years old  Frequency: every 1-2 years  Not required if history of left and right mastectomy Mammogram: Not on file    Risks and Benefits Discussed   Cervical Cancer Screening Between the ages of 21-29, pap smear recommended once every 3 years  Between the ages of 33-67, can perform pap smear with HPV co-testing every 5 years     Recommendations may differ for women with a history of total hysterectomy, cervical cancer, or abnormal pap smears in past  Pap Smear: Not on file    Screening Not Indicated   Hepatitis C Screening Once for adults born between [birth year] and 1965  More frequently in patients at high risk for Hepatitis C Hep C Antibody: Not on file        Diabetes Screening 1-2 times per year if you're at risk for diabetes or have pre-diabetes Fasting glucose: No results in last 5 years (No results in last 5 years)  A1C: 5 6 % (7/30/2022)  Screening Current   Cholesterol Screening Once every 5 years if you don't have a lipid disorder  May order more often based on risk factors  Lipid panel: 07/30/2022    Screening Not Indicated  History Lipid Disorder  Screening Current     Other Preventive Screenings Covered by Medicare:  1  Abdominal Aortic Aneurysm (AAA) Screening: covered once if your at risk  You're considered to be at risk if you have a family history of AAA  2  Lung Cancer Screening: covers low dose CT scan once per year if you meet all of the following conditions: (1) Age 50-69; (2) No signs or symptoms of lung cancer; (3) Current smoker or have quit smoking within the last 15 years; (4) You have a tobacco smoking history of at least 20 pack years (packs per day multiplied by number of years you smoked); (5) You get a written order from a healthcare provider  3  Glaucoma Screening: covered annually if you're considered high risk: (1) You have diabetes OR (2) Family history of glaucoma OR (3)  aged 48 and older OR (3)  American aged 72 and older  3  Osteoporosis Screening: covered every 2 years if you meet one of the following conditions: (1) You're estrogen deficient and at risk for osteoporosis based off medical history and other findings; (2) Have a vertebral abnormality; (3) On glucocorticoid therapy for more than 3 months; (4) Have primary hyperparathyroidism; (5) On osteoporosis medications and need to assess response to drug therapy  · Last bone density test (DXA Scan): Not on file  5  HIV Screening: covered annually if you're between the age of 12-76   Also covered annually if you are younger than 13 and older than 72 with risk factors for HIV infection  For pregnant patients, it is covered up to 3 times per pregnancy  Immunizations:  Immunization Recommendations   Influenza Vaccine Annual influenza vaccination during flu season is recommended for all persons aged >= 6 months who do not have contraindications   Pneumococcal Vaccine   * Pneumococcal conjugate vaccine = PCV13 (Prevnar 13), PCV15 (Vaxneuvance), PCV20 (Prevnar 20)  * Pneumococcal polysaccharide vaccine = PPSV23 (Pneumovax) Adults 25-60 years old: 1-3 doses may be recommended based on certain risk factors  Adults 72 years old: 1-2 doses may be recommended based off what pneumonia vaccine you previously received   Hepatitis B Vaccine 3 dose series if at intermediate or high risk (ex: diabetes, end stage renal disease, liver disease)   Tetanus (Td) Vaccine - COST NOT COVERED BY MEDICARE PART B Following completion of primary series, a booster dose should be given every 10 years to maintain immunity against tetanus  Td may also be given as tetanus wound prophylaxis  Tdap Vaccine - COST NOT COVERED BY MEDICARE PART B Recommended at least once for all adults  For pregnant patients, recommended with each pregnancy  Shingles Vaccine (Shingrix) - COST NOT COVERED BY MEDICARE PART B  2 shot series recommended in those aged 48 and above     Health Maintenance Due:      Topic Date Due    Hepatitis C Screening  Never done    Colorectal Cancer Screening  Never done     Immunizations Due:      Topic Date Due    COVID-19 Vaccine (1) Never done    Pneumococcal Vaccine: 65+ Years (1 - PCV) Never done    Influenza Vaccine (1) 09/01/2022     Advance Directives   What are advance directives? Advance directives are legal documents that state your wishes and plans for medical care  These plans are made ahead of time in case you lose your ability to make decisions for yourself   Advance directives can apply to any medical decision, such as the treatments you want, and if you want to donate organs  What are the types of advance directives? There are many types of advance directives, and each state has rules about how to use them  You may choose a combination of any of the following:  · Living will: This is a written record of the treatment you want  You can also choose which treatments you do not want, which to limit, and which to stop at a certain time  This includes surgery, medicine, IV fluid, and tube feedings  · Durable power of  for healthcare [facility]): This is a written record that states who you want to make healthcare choices for you when you are unable to make them for yourself  This person, called a proxy, is usually a family member or a friend  You may choose more than 1 proxy  · Do not resuscitate (DNR) order:  A DNR order is used in case your heart stops beating or you stop breathing  It is a request not to have certain forms of treatment, such as CPR  A DNR order may be included in other types of advance directives  · Medical directive: This covers the care that you want if you are in a coma, near death, or unable to make decisions for yourself  You can list the treatments you want for each condition  Treatment may include pain medicine, surgery, blood transfusions, dialysis, IV or tube feedings, and a ventilator (breathing machine)  · Values history: This document has questions about your views, beliefs, and how you feel and think about life  This information can help others choose the care that you would choose  Why are advance directives important? An advance directive helps you control your care  Although spoken wishes may be used, it is better to have your wishes written down  Spoken wishes can be misunderstood, or not followed  Treatments may be given even if you do not want them  An advance directive may make it easier for your family to make difficult choices about your care     Weight Management   Why it is important to manage your weight:  Being overweight increases your risk of health conditions such as heart disease, high blood pressure, type 2 diabetes, and certain types of cancer  It can also increase your risk for osteoarthritis, sleep apnea, and other respiratory problems  Aim for a slow, steady weight loss  Even a small amount of weight loss can lower your risk of health problems  How to lose weight safely:  A safe and healthy way to lose weight is to eat fewer calories and get regular exercise  You can lose up about 1 pound a week by decreasing the number of calories you eat by 500 calories each day  Healthy meal plan for weight management:  A healthy meal plan includes a variety of foods, contains fewer calories, and helps you stay healthy  A healthy meal plan includes the following:  · Eat whole-grain foods more often  A healthy meal plan should contain fiber  Fiber is the part of grains, fruits, and vegetables that is not broken down by your body  Whole-grain foods are healthy and provide extra fiber in your diet  Some examples of whole-grain foods are whole-wheat breads and pastas, oatmeal, brown rice, and bulgur  · Eat a variety of vegetables every day  Include dark, leafy greens such as spinach, kale, ann greens, and mustard greens  Eat yellow and orange vegetables such as carrots, sweet potatoes, and winter squash  · Eat a variety of fruits every day  Choose fresh or canned fruit (canned in its own juice or light syrup) instead of juice  Fruit juice has very little or no fiber  · Eat low-fat dairy foods  Drink fat-free (skim) milk or 1% milk  Eat fat-free yogurt and low-fat cottage cheese  Try low-fat cheeses such as mozzarella and other reduced-fat cheeses  · Choose meat and other protein foods that are low in fat  Choose beans or other legumes such as split peas or lentils  Choose fish, skinless poultry (chicken or turkey), or lean cuts of red meat (beef or pork)   Before you cook meat or poultry, cut off any visible fat  · Use less fat and oil  Try baking foods instead of frying them  Add less fat, such as margarine, sour cream, regular salad dressing and mayonnaise to foods  Eat fewer high-fat foods  Some examples of high-fat foods include french fries, doughnuts, ice cream, and cakes  · Eat fewer sweets  Limit foods and drinks that are high in sugar  This includes candy, cookies, regular soda, and sweetened drinks  Exercise:  Exercise at least 30 minutes per day on most days of the week  Some examples of exercise include walking, biking, dancing, and swimming  You can also fit in more physical activity by taking the stairs instead of the elevator or parking farther away from stores  Ask your healthcare provider about the best exercise plan for you  © Copyright [address] 2018 Information is for End User's use only and may not be sold, redistributed or otherwise used for commercial purposes   All illustrations and images included in CareNotes® are the copyrighted property of [employer]  or [address] [place] [address]

## 2022-08-16 NOTE — PROGRESS NOTES
Assessment and Plan:     Problem List Items Addressed This Visit        Other    Medicare annual wellness visit, initial - Primary      Other Visit Diagnoses     Urinary frequency        Relevant Medications    oxybutynin (DITROPAN-XL) 5 mg 24 hr tablet    Other Relevant Orders    UA w Reflex to Microscopic w Reflex to Culture -Lab Collect    Ambulatory Referral to Physical Therapy    Screening for osteoporosis        Relevant Orders    DXA bone density spine hip and pelvis    Urge incontinence        Relevant Medications    oxybutynin (DITROPAN-XL) 5 mg 24 hr tablet    Other Relevant Orders    Ambulatory Referral to Physical Therapy    Asymptomatic menopausal state         Relevant Orders    DXA bone density spine hip and pelvis    Screen for colon cancer        Relevant Orders    Ambulatory referral for colonoscopy    Welcome to Medicare preventive visit        Obesity (BMI 30 0-34  9)               Preventive health issues were discussed with patient, and age appropriate screening tests were ordered as noted in patient's After Visit Summary  Personalized health advice and appropriate referrals for health education or preventive services given if needed, as noted in patient's After Visit Summary  History of Present Illness:     Patient presents for a Medicare Wellness Visit    Patient presents for medicare annual wellness  She does not speak english, only Ghanaian  Son in law is present in the room and daughter is on the phone to help with translation and help provide history  She lives with her daughter  There is one flight of stairs with a handrail, which she takes her time using  Daughter states there is also a chair in the shower for patient to use as needed  No concerns for falls  Patient denies any problems with depression or sleeping  Has not had a colonoscopy in many years   Reports she had a mammogram last year in [facility] which was normal      She does want to talk about urinary issues she has been having  States for the last year she has been having sudden urges to urinate  She denies having any accidents thus far, but says when she has to urinate, she has to go immediately or she feels as though she would have an accident  Denies any dysuria, odor, hematuria  Patient Care Team:  Hayley Packer MD as PCP - General (Internal Medicine)     Review of Systems:     Review of Systems   Constitutional: Positive for appetite change  Negative for fatigue and unexpected weight change  Eyes: Negative for visual disturbance  Respiratory: Negative for cough, shortness of breath and wheezing  Cardiovascular: Negative for chest pain and leg swelling  Gastrointestinal: Negative for abdominal pain, diarrhea, nausea and vomiting  Genitourinary: Positive for urgency  Negative for difficulty urinating, dysuria and hematuria  Musculoskeletal: Negative for arthralgias and back pain  Neurological: Negative for dizziness, syncope, weakness and headaches  Psychiatric/Behavioral: Negative for behavioral problems, dysphoric mood and sleep disturbance  The patient is not nervous/anxious  Problem List:     Patient Active Problem List   Diagnosis    Weakness of right hand    Hypertensive urgency    Intracranial aneurysm    Hyperlipidemia    Primary hypertension    Cerebrovascular accident (CVA) due to occlusion of precerebral artery ([facility] [address] )    Medicare annual wellness visit, initial      Past Medical and Surgical History:     History reviewed  No pertinent past medical history  History reviewed  No pertinent surgical history  Family History:     History reviewed  No pertinent family history     Social History:     Social History     Socioeconomic History    Marital status: Single     Spouse name: None    Number of children: None    Years of education: None    Highest education level: None   Occupational History    None   Tobacco Use    Smoking status: Never Smoker    Smokeless tobacco: Never Used   Vaping Use    Vaping Use: Never used   Substance and Sexual Activity    Alcohol use: Never    Drug use: Never    Sexual activity: Not Currently   Other Topics Concern    None   Social History Narrative    None     Social Determinants of Health     Financial Resource Strain: Not on file   Food Insecurity: Not on file   Transportation Needs: Not on file   Physical Activity: Not on file   Stress: Not on file   Social Connections: Not on file   Intimate Partner Violence: Not on file   Housing Stability: Not on file      Medications and Allergies:     Current Outpatient Medications   Medication Sig Dispense Refill    amLODIPine (NORVASC) 5 mg tablet Take 1 tablet (5 mg total) by mouth daily 30 tablet 2    aspirin (ECOTRIN LOW STRENGTH) 81 mg EC tablet Take 1 tablet (81 mg total) by mouth daily 30 tablet 2    atorvastatin (LIPITOR) 80 mg tablet Take 0 5 tablets (40 mg total) by mouth every evening 30 tablet 2    lisinopril (ZESTRIL) 5 mg tablet Take 1 tablet (5 mg total) by mouth daily 30 tablet 1    oxybutynin (DITROPAN-XL) 5 mg 24 hr tablet Take 1 tablet (5 mg total) by mouth daily at bedtime Take 2 5mg (0 5 tablet) at night 30 tablet 1     No current facility-administered medications for this visit  No Known Allergies   Immunizations: There is no immunization history on file for this patient     Health Maintenance:         Topic Date Due    Hepatitis C Screening  Never done    Colorectal Cancer Screening  Never done         Topic Date Due    COVID-19 Vaccine (1) Never done    Pneumococcal Vaccine: 65+ Years (1 - PCV) Never done    Influenza Vaccine (1) 09/01/2022      Medicare Screening Tests and Risk Assessments:     Annual Wellness Visit  No exam data present     Physical Exam:     /74 (BP Location: Left arm, Patient Position: Sitting, Cuff Size: Standard)   Pulse 89   Temp 98 °F (36 7 °C) (Tympanic)   Ht 4' 11" (1 499 m)   Wt 77 8 kg (171 lb 9 6 oz)   SpO2 97%   BMI 34 66 kg/m²     Physical Exam  Constitutional:       General: She is not in acute distress  Appearance: She is obese  HENT:      Head: Normocephalic and atraumatic  Right Ear: External ear normal       Left Ear: External ear normal       Mouth/Throat:      Mouth: Mucous membranes are moist       Pharynx: Oropharynx is clear  Eyes:      Conjunctiva/sclera: Conjunctivae normal    Cardiovascular:      Rate and Rhythm: Normal rate and regular rhythm  Heart sounds: No murmur heard  Pulmonary:      Effort: Pulmonary effort is normal  No respiratory distress  Breath sounds: No wheezing or rales  Abdominal:      General: Abdomen is flat  Palpations: Abdomen is soft  Tenderness: There is no abdominal tenderness  Musculoskeletal:      Right lower leg: No edema  Left lower leg: No edema  Skin:     General: Skin is warm and dry  Neurological:      Mental Status: She is alert  Mental status is at baseline  Psychiatric:         Mood and Affect: Mood normal          Thought Content:  Thought content normal          Judgment: Judgment normal           Bassam Alonso DO

## 2022-08-17 ENCOUNTER — OFFICE VISIT (OUTPATIENT)
Dept: OCCUPATIONAL THERAPY | Facility: CLINIC | Age: 77
End: 2022-08-17
Payer: MEDICARE

## 2022-08-17 ENCOUNTER — APPOINTMENT (OUTPATIENT)
Dept: PHYSICAL THERAPY | Facility: CLINIC | Age: 77
End: 2022-08-17
Payer: MEDICARE

## 2022-08-17 DIAGNOSIS — I63.20 CEREBROVASCULAR ACCIDENT (CVA) DUE TO OCCLUSION OF PRECEREBRAL ARTERY (HCC): Primary | ICD-10-CM

## 2022-08-17 PROCEDURE — 97530 THERAPEUTIC ACTIVITIES: CPT | Performed by: OCCUPATIONAL THERAPIST

## 2022-08-17 NOTE — PROGRESS NOTES
Daily Note     Today's date: 2022  Patient name: Belkys Cooney  :   MRN: [id number]  Referring provider: Tianna Cat MD  Dx:   Encounter Diagnosis   Name Primary?  Cerebrovascular accident (CVA) due to occlusion of precerebral artery ([facility] [address] ) Yes                  Precautions: Hungarian speaking, falls, mod severity b/l cataracts  Visit 2    PN due   POC valid 22  No Auth- BOMN    Subjective: Pt reports no changes since last session      Objective: See treatment below  [place] Cognitive Assessment Version 8 1 (MoCA V8 1) (translated by pt's son-in-law)  Visuospatial/executive functioning:  3/5 (able to draw cube, contour and numbers for clock)  Naming:  3/3  Memory: 1st trial:  5, 2nd trial:  5  Attention/concentration: 2/2  List of letters:   Serial Seven Subtraction:  3/3 w/ 1 errors  Language/sentence repetition:  1/2  Language Fluency:    Abstract/Correlational Thinkin/2  Delayed Recall:  3/5  Orientation:  3/6 (oriented to month, date, place)   Memory Index Score: 11/15  MoCA V1 8 1 Raw Score:  20/30, MIS:  11/15, indicative of mild neurocognitive impairments  Level of education: started college    Trail Making Part A: 1:44 with 1 self corrected error  Trail Making Part B: 3:23 with multiple errors/omissions, at least 10 verbal cues  Norms for age/education: A 41 seconds, B 100 seconds    Assessment: Tolerated treatment well  Pt demonstrated impairments in EF, immediate and delayed recall  Pt would benefit from continued OT services to address RUE function, cognition for life roles  Plan: Continued skilled OT per POC

## 2022-08-17 NOTE — PROGRESS NOTES
Talked with daughter Rubens Martinez on the phone about results of UA, showing possible UTI with positive leukocytes and nitrites  Confirmed patient had no allergies  Sent in a prescription for 5 days macrobid 100mg bid to treat  Informed daughter we would return call after cultures came back to let them know if we needed to change meds  Daughter voiced understanding and agreed with plan

## 2022-08-18 LAB — BACTERIA UR CULT: ABNORMAL

## 2022-08-19 ENCOUNTER — OFFICE VISIT (OUTPATIENT)
Dept: OCCUPATIONAL THERAPY | Facility: CLINIC | Age: 77
End: 2022-08-19
Payer: MEDICARE

## 2022-08-19 ENCOUNTER — EVALUATION (OUTPATIENT)
Dept: PHYSICAL THERAPY | Facility: CLINIC | Age: 77
End: 2022-08-19
Payer: MEDICARE

## 2022-08-19 DIAGNOSIS — I63.20 CEREBROVASCULAR ACCIDENT (CVA) DUE TO OCCLUSION OF PRECEREBRAL ARTERY (HCC): Primary | ICD-10-CM

## 2022-08-19 PROCEDURE — 97162 PT EVAL MOD COMPLEX 30 MIN: CPT

## 2022-08-19 PROCEDURE — 97112 NEUROMUSCULAR REEDUCATION: CPT | Performed by: OCCUPATIONAL THERAPIST

## 2022-08-19 PROCEDURE — 97530 THERAPEUTIC ACTIVITIES: CPT | Performed by: OCCUPATIONAL THERAPIST

## 2022-08-19 NOTE — PROGRESS NOTES
Daily Note     Today's date: 2022  Patient name: Ese Mclean  :   MRN: [id number]  Referring provider: Allison Zarco MD  Dx:   Encounter Diagnosis   Name Primary?  Cerebrovascular accident (CVA) due to occlusion of precerebral artery ([address] ) Yes       Start Time: 1018  Stop Time: 1100  Total time in clinic (min): 42 minutes    Precautions: Macanese speaking, falls, mod severity b/l cataracts  Visit 3  PN due   POC valid 22  No Auth- BOMN    Subjective: Pt's daughter reports that they purchased a shower chair  PT Suzanna Her reports balance deficits with head turns to R side  Objective: See treatment below  NMRE:  - Finger to Nose:    Pt eyes open:   R = 7 42 seconds, tremor noted                L = 6 39 seconds    Pt eyes closed:   R = 9 53 seconds with 3/5 missed finger        L = 6 79 seconds with 2/5 missed finger   Pt demonstrating (+) dysmetria with R side  - Large peg board on table while standing on foam using yellow pnksters on RUE focusing on digit flexion and extension, dexterity, and static standing balance  Pt reported pain in R thumb using pnkster which was removed  Transitioned to picking up and inserting pegs 2 at a time  Pt demonstrated good ability removing pegs 2 at a time upgraded to 3 at a time  Pt required max VCs to fully extend R hand when reading for peg    - Double spot following pattern at R side standing on foam holding multiple pieces in R hand  Pt focused on [facility], dexterity, and static standing balance demonstrating good standing balance, FMC, and dexterity  Pt required 3 VCs to hold multiple pieces at a time and 2 VCs to remove L hand from table for support while standing  Therapeutic Activity:  - Pt educated on tan theraputty HEP with pt's daughter interpreting instructions  Pt demonstrated each activity in session with good carryover  Educated to stop any exercise that causes pain  Assessment: Tolerated treatment well   Pt demonstrated good static standing balance, FMC, and dexterity however reported pain in R thumb when attempting R hand strengthening  Pt would benefit from continued OT services to address RUE function, cognition for life roles  Plan: Continued skilled OT per POC

## 2022-08-19 NOTE — PROGRESS NOTES
PT Evaluation          POC expires Auth Status Total     Start date  Expiration date PT/OT + Visit Limit? Co-Insurance   22 NA NA NA NA BOMN, PT + OT No                                                Today's date: 2022  Patient name: Abril Elizondo  : 1945  MRN: [id number]  Referring provider: Jessica Lizarraga MD  Dx:   Encounter Diagnosis     ICD-10-CM    1  Cerebrovascular accident (CVA) due to occlusion of precerebral artery ([facility] [address] )  I63 20 Ambulatory Referral to Physical Therapy         Assessment  Assessment details:  Patient is a 78year old male who is presenting to skilled OPPT services following TIA and subsequent (R) sided weakness that in turn has limited safe performance in ADLs/IADLs and mobility at home and in the community  Patient presented to ED on 22 with complaints of dizziness and (R) sided weakness  CTA scan and MRI findings negative, however referral for cerebrovascular accident (CVA) due to occlusion of precerebral artery per Dr Aruna Sosa MD  Strength screen revealed gross RLE weakness per MMT grading (see below)  Coordination screen revealed poor coordination with alternating toe taps as well as slowed performance with dysmetria and dysdiadochokinesia testing  Diminished light touch on RLE compared to LLE per sensation screen, in addition to reported cramping in (R) calf with prolonged ambulation  Patient performed below age-matched normative values for the following outcome measures: 5 x STS, TUG, Alves, FGA, and DGI suggesting deficits in functional LE strength, safe functional mobility, static balance, and dynamic balance, respectively  Per cutoff scores for the 5 x STS, TUG, Alves, FGA, and DGI she is classified as HIGH risk for falls  All normative values and cutoff scores taken from the APTA Neuro Section and Rehab Measures  Plan for general strength, balance, and gait training   Consider HIGT per current research for maximal recovery post-CVA or TIA, however patient may be limited by onset of RLE pain with prolonged ambulation  Patient will benefit from skilled OPPT services to address these aforementioned deficits in order to maximize functional mobility post-TIA  Impairments: Abnormal gait, Activity intolerance and Impaired balance  Understanding of Dx/Px/POC: Good  Prognosis: Excellent      Patient verbalized understanding of POC  Please contact me if you have any questions or recommendations  Thank you for the referral and the opportunity to share in [address] care          Plan  Plan details: balance, gait, strength training  Patient would benefit from: PT Eval and Skilled PT  Planned modality interventions: Biofeedback, Cryotherapy, TENS and Thermotherapy: Hydrocollator Packs  Planned therapy interventions: Balance, Gait training, Neuromuscular re-education, Strengthening, Stretching, Therapeutic activities and Therapeutic exercises  Frequency: 2x/wk  Duration in weeks: 12 weeks  Plan of Care beginning date: 8/19/2022  Plan of Care expiration date: 12 weeks - 11/11/2022  Treatment plan discussed with: Patient         Goals  Short Term Goals (4 weeks):    - Patient will improve time on TUG by 2 9 seconds from 15 01 seconds to 12 11 seconds to facilitate improved safety in all ambulation  - Patient will improve scoring on DGI by 2 6 points from 16/24 to 19/24 to progress safety  - Patient will be independent in basic HEP 2-3 weeks  - Patient will improve 5xSTS score by 2 3 seconds from 14 25 seconds to 11 95 seconds to promote improved LE functional strength needed for ADLs    Long Term Goals (12 weeks):  - Patient will be independent in a comprehensive home exercise program  - Patient will improve gait speed by 0 18 s/sec to improve safety with community ambulation  - Patient will improve LEACH by 6 points from 41/56 to 47/56 to facilitate return to safe independent ambulation  - Patient will improve scoring on FGA by 4 points from 19/30 to 23/30 to progress safety with dynamic tasks  - Patient will be able to demonstrate HT in gait without veering  - Patient will improve 6 Minute Walk Test score by 190 feet to promote improved cardiovascular endurance  - Patient will report 50% reduction in near falls in order to improve safety with functional tasks and reduce his risk for falls  - Patient will report going on walks at least 3 days per week to promote independence and improved cardiovascular endurance  - Patient will be able to ascend/descend stairs reciprocally without UE assist to promote independence and safety with ADLs  - Patient will report 50% reduction in near falls when ambulating on uneven terrain      Cut off score    All date taken from APTA Neuro Section or Rehab Measures      Alves:  Agustin et al , 2018  Mariela Orange [employer] [address]: 2 7 pts    Genia mir al , 2011  Cut-off score: 45/56    Chronic CVA  < 44/56 high risk for falls (Agustin et al , 2018)  < 47 5/56 slow walker status (Tigist mir al , 2011) 5xSTS: Gloria 2010  Mariela Her[address]: 2 3 sec  Age Norms:  60-69: 11 1 sec  70-79: 12 6 sec  80-89: 14 8 sec    Lizeth 2010, Chronic Stroke  Chronic CVA: 12 sec   TUG  Reymundo , 2005  MDC: 2 9 sec    Cut off score:  >13 5 sec community dwelling adults  >32 2 frail elderly  <20 I for basic transfers  >30 dependent on transfers 10 Meter Walk Test:   Fred pablo , 2006  Small meaningful change: 0 06 m/s  Substantial meaningful change: 0 14 m/s  MCID: 0 16 m/s    < 0 4 m/s household ambulators  0 4 - 0 8 m/s limited community ambulators  > 0 8 m/s community ambulators   FGA: Renee et al , 2010  MCID: 4 2 pts  Geriatrics/community < 22/30 fall risk  Geriatrics/community < 20/30 unexplained falls DGI  MDC: vestibular - 4 pts  MDC: geriatric/community - 3 pts  Falls risk <19/24   6 Minute Walk Test  Fred pablo , 2006  MDC: 60 98 m (200 01 ft)    Kari Webster, Brad, & Cut off, 2012  MCID: 34 4 m    Age Norms  60-69: M - 1876 ft   F - 1765 ft  70-79: M - 1729 ft   F - 1545 ft  80-89: M - 1368 ft   F - 1286 ft Modified Iain  0: No increase in tone  1: Catch and release or min resistance at end range  1+: Catch f/b min resistance throughout remainder (< half ROM)  2: Easily moved, but more marked tone throughout most ROM  3: Significant tone, PROM difficult  4: Rigid   MiniBest: Sarah et al , 2013  CVA < 17 5 fall risk Pass (Acute CVA)  MDC: 1 8 points (acute), 3 2 points (chronic)         Subjective    History of Present Illness  Mechanism of injury: Patient is a 78year old male who is presenting to skilled OPPT services following TIA and sequale of (R) sided weakness  Patient presented to ED on 22 with complaints of dizziness and (R) sided weakness  CTA scan and MRI findings negative  Patient is primarily [address] speaking and daughter Jose Luis Rubio presents to serve as   Patient's daughter reports patient enjoys walking the family dog but is unable to walk longer than one block secondary to onset of RLE pain  She obtained a shower chair for her mother based on OT's recommendation  She notes no falls, but states her mother has been more off balance        Primary AD: none  Assist level at home: modified independent, supervision with showering  WC usage: none  PLOF: independent    Pain  Current pain ratin/10  At best pain ratin/10  At worst pain ratin/10  Location: cramping in calf  Aggravating factors: walking    Social Support  Steps to enter house: 3 [address], but goes through garage  Stairs in house: one flight, L hand side   Lives in: 2 story home  Lives with: daughter    Employment status: retired  Hand dominance: R    Treatments  Previous treatment: none  Current treatment: OT  Diagnostic Testing: CT scan, MRI      Objective     Vitals  - HR: 76 bpm  - BP: 122/76  - SPO2: 98%    HR Max  - 207 - (0 7x75)  = 154 bpm    LE MMT  - R Hip Flexion: 3/5   - L Hip Flexion: 4/5  - R Hip Extension: 3/5   - L Hip Extension: 4/5  - R Hip Abduction: 3+/5  - L Hip abduction: 4+/5  - R Hip adduction: 3+/5  - L Hip adduction: 4+/5  - R Knee Extension: 3+/5  - L Knee Extension: 4+/5  - R Knee Flexion: 4-/5   - L Knee Flexion: 4+/5  - R Ankle DF: 3/5   - L Ankle DF: 4+/5    Sensation  - Light touch: diminished light touch on right  - Deep pressure: intact    Coordination  - Dysmetria: slowed  - Dysdiadochokinesia: slowed  - Alternating Toe Taps: difficulty, slowed on R    Modified Iain  - R knee extension: 1 - catch and release or minimum resistance at end range  - L knee extension: 0 - no increase in tone  - R knee flexion: 0 - no increase in tone       - L knee flexion: 0 - no increase in tone  - R ankle DF: 0 - no increase in tone        - L ankle DF: 0 - no increase in tone      Clonus  - L: No  - R: No  - Fatiguing: No  - Number of beats: NA    Vision  - Glasses: Yes  - Abnormalities prior to CVA: Yes, cataracts both eyes  - Abnormalities post CVA: No    Neglect  - R sided: No  - L sided: No    Pusher's Syndrome  - R sided: No  - L sided: No        Outcome Measures Initial Eval  8/19/2022        5xSTS 14 25 sec,   2 UE         TUG 15 01 sec        10 meter defer m/s        LEACH 41/56        FGA 19/30        DGI 16/24        MiniBEST defer/28        6MWT defer ft                      Precautions: CVA     **Czech Speaking**    No past medical history on file

## 2022-08-23 ENCOUNTER — OFFICE VISIT (OUTPATIENT)
Dept: PHYSICAL THERAPY | Facility: CLINIC | Age: 77
End: 2022-08-23
Payer: MEDICARE

## 2022-08-23 ENCOUNTER — OFFICE VISIT (OUTPATIENT)
Dept: OCCUPATIONAL THERAPY | Facility: CLINIC | Age: 77
End: 2022-08-23
Payer: MEDICARE

## 2022-08-23 DIAGNOSIS — I63.20 CEREBROVASCULAR ACCIDENT (CVA) DUE TO OCCLUSION OF PRECEREBRAL ARTERY (HCC): Primary | ICD-10-CM

## 2022-08-23 PROCEDURE — 97112 NEUROMUSCULAR REEDUCATION: CPT | Performed by: OCCUPATIONAL THERAPIST

## 2022-08-23 PROCEDURE — 97112 NEUROMUSCULAR REEDUCATION: CPT

## 2022-08-23 PROCEDURE — 97110 THERAPEUTIC EXERCISES: CPT

## 2022-08-23 NOTE — PROGRESS NOTES
Daily Note     Today's date: 2022  Patient name: Lisseth Stearns  :   MRN: [id number]  Referring provider: Ozzie Patiño MD  Dx:   Encounter Diagnosis     ICD-10-CM    1  Cerebrovascular accident (CVA) due to occlusion of precerebral artery (HCC)  I63 20                   Subjective: Patient reports from OT session with no new issues or complaints  Objective: See treatment diary below    TA:  - miniBEST (see below)  - 6 MWT (see below)  - 10 MWT (see below)    TA/NMR:  Access Code: [email or web address]  URL: [email or web address]  Date: 2022  Prepared by: Cuba Woods    Exercises  · Sit to Stand - 1 x daily - 7 x weekly - 2 sets - 10 reps  · Standing March with Counter Support - 1 x daily - 7 x weekly - 2 sets - 10 reps - 3 sec hold  · Standing Hip Abduction with Counter Support - 1 x daily - 7 x weekly - 2 sets - 10 reps - 3 sec hold  · Standing Hip Extension with Counter Support - 1 x daily - 7 x weekly - 2 sets - 10 reps - 3 sec hold  · Gastroc Stretch on Wall - 1 x daily - 7 x weekly - 3 sets - 30 sec hold    - Ambulation w/ 5# R ankle weight 2 x 100 ft    Assessment: Patient tolerated treatment session well today with focus on concluding outcome measure testing and establishing simple HEP  Patient performed below age-matched normative values for the miniBEST, 6 MWT, and 10 MWT, likely indicating overall deficits in postural and reactive balance, cardiovascular endurance, and ambulation speed, respectively  Patient educated on simple HEP and provided handout; she demonstrated comprehensive understanding  Patient will continue to benefit from skilled OPPT services to address deficits in gait, balance, and strength in order to maximize functional mobility post-TIA  Plan: Continue per plan of care  Progress treatment as tolerated  POC expires Auth Status Total     Start date  Expiration date PT/OT + Visit Limit?  Co-Insurance   22 NA NA NA NA BOMN, PT + OT No Outcome Measures Initial Eval  8/19/2022 & DN 8/23/22        5xSTS 14 25 sec,   2 UE         TUG 15 01 sec        10 meter 0 8 m/s        LEACH 41/56        FGA 19/30        DGI 16/24        MiniBEST 13/28        6MWT 840 ft

## 2022-08-23 NOTE — PROGRESS NOTES
Daily Note     Today's date: 2022  Patient name: Criselda Hanley  :   MRN: [id number]  Referring provider: Rayna Marquez MD  Dx:   Encounter Diagnosis   Name Primary?  Cerebrovascular accident (CVA) due to occlusion of precerebral artery ([facility] [address] ) Yes       Start Time: 809  Stop Time: 0845  Total time in clinic (min): 36 minutes    Precautions: [place]ymra[place] speaking, falls, mod severity b/l cataracts  Visit 4  PN due   POC valid 22  No Auth- BOMN    Subjective: Pt reports that she has been doing theraputty HEP  Objective: See treatment below  NMRE:  -Multimatrix with red pinch pin, 1lb wrist weight to RUE for proprioceptive input and pt picking up white cubes and translating to open cube to reveal a letter  Pt required to name a place starting with each letter  Pt noted with multiple errors when matching shapes, min difficulty with naming component of task   -120 number board while standing on foam beam and pt completing serial 4 subtraction, picking up and placing tiles on answers in 2 green, 1 red pattern  One lb wrist weight applied to RUE for proprioceptive input  Completed to address RUE motor control and [address], standing balance, sustained and divided attention  Pt required min cues for error ID/correction  Assessment: Tolerated treatment well  Pt demonstrated difficulty with  and serial subtraction tasks  Pt would benefit from continued OT services to address RUE function, cognition for life roles  Plan: Continued skilled OT per POC

## 2022-08-25 ENCOUNTER — CONSULT (OUTPATIENT)
Dept: GASTROENTEROLOGY | Facility: CLINIC | Age: 77
End: 2022-08-25
Payer: MEDICARE

## 2022-08-25 ENCOUNTER — TELEPHONE (OUTPATIENT)
Dept: GASTROENTEROLOGY | Facility: CLINIC | Age: 77
End: 2022-08-25

## 2022-08-25 VITALS
SYSTOLIC BLOOD PRESSURE: 109 MMHG | HEART RATE: 78 BPM | HEIGHT: 60 IN | DIASTOLIC BLOOD PRESSURE: 75 MMHG | WEIGHT: 173.6 LBS | BODY MASS INDEX: 34.08 KG/M2

## 2022-08-25 DIAGNOSIS — Z12.11 SCREEN FOR COLON CANCER: ICD-10-CM

## 2022-08-25 PROCEDURE — 99203 OFFICE O/P NEW LOW 30 MIN: CPT | Performed by: NURSE PRACTITIONER

## 2022-08-25 NOTE — TELEPHONE ENCOUNTER
[facility] Assessment    Name: Gretchen Smith  YOB: 1945  Last Height: 5' (1 524 m)  Last weight: 78 7 kg (173 lb 9 6 oz)  BMI: 33 90 kg/m²  Procedure: Colon  Diagnosis: screening  Date of procedure: 9/16/22  Prep: miralax w/ dul (British)  Responsible : yes  Phone#: [phone]  Name completing form: Cruzito Kim  Date form completed: 08/25/22      If the patient answers yes to any of these questions, schedule in a hospital  Are you pregnant: No  Do you rely on a wheelchair for mobility: No  Have you been diagnosed with End Stage Renal Disease (ESRD): No  Do you need oxygen during the day: No  Have you had a heart attack or stroke within the past three months: No  Have you had a seizure within the past three months: No  Have you ever been informed by anesthesia that you have a difficult airway: No  Additional Questions  Have you had any cardiac testing or are under the care of a Cardiologist (see cardiac list): No  Cardiac list:   Do you have an implanted cardiac defibrillator: No (Comment:  This patient should be scheduled in the hospital)    Have any bleeding problems, such as anemia or hemophilia (If patient has H&H result below 8, schedule in hospital   H&H must be within 30 days of procedure): No    Had an organ transplant within the past 3 months: No    Do you have any present infections: No  Do you get short of breath when walking a few blocks: No  Have you been diagnosed with diabetes: No  Comments (provide cardiac provider information if applicable):

## 2022-08-25 NOTE — PROGRESS NOTES
[facility] - Outpatient Consultation  Norberto Navarro 78 y o  female MRN: [id number]  Encounter: [id number]          ASSESSMENT AND PLAN:      1  Screen for colon cancer  Patient reports previous colonoscopy over 10 years ago  Report not available  Patient is overdue for colon cancer screening  Patient is on no blood thinners except for 81 mg of aspirin daily  - Ambulatory referral for colonoscopy  - Colonoscopy; schedule for colonoscopy  Prep and procedure explained to patient in detail  Further recommendations pending results of colonoscopy  -MiraLax and Dulcolax prep for colonoscopy    Follow-up pending results of colonoscopy     ______________________________________________________________________    HPI:  Kennedi James is a 72-year-old [place]ian-speaking female who presents to office for colon cancer screening  Patient denies any GI issues except for intermittent epic of nausea  Patient denies vomiting, acid reflux, heartburn, dysphagia, epigastric abdominal pain  Patient denies blood in stool, blood from rectal area, or black tarry stool  Patient bowel patterns are regular  Denies constipation or diarrhea  Per patient last colonoscopy done over 10 years ago  Review lab work done July 2022 shows hemoglobin 11 7 and within normal limits  BMP within normal limits except for chloride 110 and calcium 7 9  Results are unknown  No report available  Patient does not smoke  No family history of gastric or colon cancer  REVIEW OF SYSTEMS:    CONSTITUTIONAL: Denies any fever, chills, rigors, and weight loss  HEENT: No earache or tinnitus  Denies hearing loss or visual disturbances  CARDIOVASCULAR: No chest pain or palpitations  RESPIRATORY: Denies any cough, hemoptysis, shortness of breath or dyspnea on exertion  GASTROINTESTINAL: As noted in the History of Present Illness  GENITOURINARY: No problems with urination   Denies any hematuria or dysuria  NEUROLOGIC: No dizziness or vertigo, denies headaches  MUSCULOSKELETAL: Denies any muscle or joint pain  SKIN: Denies skin rashes or itching  ENDOCRINE: Denies excessive thirst  Denies intolerance to heat or cold  PSYCHOSOCIAL: Denies depression or anxiety  Denies any recent memory loss  Historical Information   Past Medical History:   Diagnosis Date    Hypertension     Kidney stones      History reviewed  No pertinent surgical history  Social History   Social History     Substance and Sexual Activity   Alcohol Use Never     Social History     Substance and Sexual Activity   Drug Use Never     Social History     Tobacco Use   Smoking Status Never Smoker   Smokeless Tobacco Never Used     Family History   Problem Relation Age of Onset    No Known Problems Mother     No Known Problems Father        Meds/Allergies       Current Outpatient Medications:     amLODIPine (NORVASC) 5 mg tablet    aspirin (ECOTRIN LOW STRENGTH) 81 mg EC tablet    atorvastatin (LIPITOR) 80 mg tablet    lisinopril (ZESTRIL) 5 mg tablet    oxybutynin (DITROPAN-XL) 5 mg 24 hr tablet    No Known Allergies        Objective     Blood pressure 109/75, pulse 78, height 5' (1 524 m), weight 78 7 kg (173 lb 9 6 oz)  Body mass index is 33 9 kg/m²  PHYSICAL EXAM:      General Appearance:   Alert, cooperative, no distress   HEENT:   Normocephalic, atraumatic, anicteric      Neck:  Supple, symmetrical, trachea midline   Lungs:   Clear to auscultation bilaterally; no rales, rhonchi or wheezing; respirations unlabored    Heart[de-identified]   Regular rate and rhythm; no murmur, rub, or gallop     Abdomen:   Soft, mild abdominal tenderness to right lower quadrant with palpation, non-distended; normal bowel sounds; no masses, no organomegaly    Genitalia:   Deferred    Rectal:   Deferred    Extremities:  No cyanosis, clubbing or edema    Pulses:  2+ and symmetric    Skin:  No jaundice, rashes, or lesions    Lymph nodes:  No palpable cervical lymphadenopathy        Lab Results:   No visits with results within 1 Day(s) from this visit  Latest known visit with results is:   Appointment on 08/16/2022   Component Date Value    Color, UA 08/16/2022 Yellow     Clarity, UA 08/16/2022 Turbid     Specific Gravity, UA 08/16/2022 1 024     pH, UA 08/16/2022 5 5     Leukocytes, UA 08/16/2022 Large (A)    Nitrite, UA 08/16/2022 Positive (A)    Protein, UA 08/16/2022 Trace (A)    Glucose, UA 08/16/2022 Negative     Ketones, UA 08/16/2022 Negative     Urobilinogen, UA 08/16/2022 <2 0     Bilirubin, UA 08/16/2022 Negative     Occult Blood, UA 08/16/2022 Small (A)    RBC, UA 08/16/2022 4-10 (A)    WBC, UA 08/16/2022 Innumerable (A)    Epithelial Cells 08/16/2022 Occasional     Bacteria, UA 08/16/2022 Moderate (A)    MUCUS THREADS 08/16/2022 Innumerable (A)    Hyaline Casts, UA 08/16/2022 0-3 (A)    Ca Oxalate Lacy, UA 08/16/2022 Innumerable (A)    WBC Clumps 08/16/2022 Present     Urine Culture 08/16/2022 >100,000 cfu/ml Escherichia coli (A)         Radiology Results:   MRI brain wo contrast    Result Date: 7/30/2022  Narrative: MRI BRAIN WITHOUT CONTRAST INDICATION: Stroke-like symptoms  Right-sided numbness  Imbalance  Weakness  COMPARISON:   3/9/2015; 7/29/2022; H&P 7/29/2022 TECHNIQUE:  Sagittal T1, axial T2, axial FLAIR, axial T1, axial [place] and axial diffusion imaging  IMAGE QUALITY:  Diagnostic  FINDINGS: BRAIN PARENCHYMA:  There is no discrete mass, mass effect or midline shift  There is no intracranial hemorrhage  There is no evidence of acute infarction and diffusion imaging is unremarkable  Small scattered hyperintensities on T2/FLAIR imaging are noted in the periventricular and subcortical white matter demonstrating an appearance that is statistically most likely to represent mild microangiopathic change    Chronic left basal ganglia lacunar infarction redemonstrated with peripheral hemosiderin staining indicative of old hemorrhagic lacunar infarction  No acute intracranial hemorrhage currently  VENTRICLES:  Mild ex vacuo dilatation left lateral ventricle  SELLA AND PITUITARY GLAND:  Normal  ORBITS:  Normal  PARANASAL SINUSES:  Normal  VASCULATURE:  Evaluation of the major intracranial vasculature demonstrates appropriate flow voids  CALVARIUM AND SKULL BASE:  Normal  EXTRACRANIAL SOFT TISSUES:  Normal      Impression: 1  No acute infarction, intracranial hemorrhage or mass effect  2   Mild, chronic microangiopathy is similar to the previous study as is a small chronic left basal ganglia lacunar infarction  Workstation performed: [id number]     CT stroke alert brain    Result Date: 7/29/2022  Narrative: CT BRAIN - STROKE ALERT PROTOCOL INDICATION:   Stroke Alert  COMPARISON:  None  TECHNIQUE:  CT examination of the brain was performed  In addition to axial images, coronal reformatted images were created and submitted for interpretation  Radiation dose length product (DLP) for this visit:  861 mGy-cm   This examination, like all CT scans performed in the [facility], was performed utilizing techniques to minimize radiation dose exposure, including the use of iterative reconstruction and automated exposure control  IMAGE QUALITY:  Diagnostic  FINDINGS:  PARENCHYMA:  No intracranial mass, mass effect or midline shift  No CT signs of acute infarction  No acute parenchymal hemorrhage  Old left basal ganglia/anterior limb internal capsule infarct  VENTRICLES AND EXTRA-AXIAL SPACES:  Normal for patient's age  VISUALIZED ORBITS AND PARANASAL SINUSES:  Unremarkable  CALVARIUM AND EXTRACRANIAL SOFT TISSUES:   Normal      Impression: No acute intracranial abnormality    I personally discussed this study with neurologist  on 7/29/2022 at 5:28 PM  Workstation performed: [id number]     CTA stroke alert (head/neck)    Result Date: 7/29/2022  Narrative: CTA NECK AND BRAIN WITH CONTRAST INDICATION: Stroke Alert COMPARISON: None  TECHNIQUE:   Post contrast imaging was performed after administration of iodinated contrast through the neck and brain  Post contrast axial 0 625 mm images timed to opacify the arterial system  3D rendering was performed on an independent workstation  MIP reconstructions performed  Coronal reconstructions were performed of the noncontrast portion of the brain  Radiation dose length product (DLP) for this visit:  461 mGy-cm   This examination, like all CT scans performed in the [facility], was performed utilizing techniques to minimize radiation dose exposure, including the use of iterative reconstruction and automated exposure control  IV Contrast:  85 mL of iohexol (OMNIPAQUE)  IMAGE QUALITY:   Diagnostic FINDINGS: CERVICAL VASCULATURE AORTIC ARCH AND GREAT VESSELS:  Mild atherosclerotic disease of the arch, proximal great vessels and visualized subclavian vessels  No significant stenosis  RIGHT VERTEBRAL ARTERY CERVICAL SEGMENT:  Normal origin  The vessel is normal in caliber throughout the neck  LEFT VERTEBRAL ARTERY CERVICAL SEGMENT:  Normal origin  The vessel is normal in caliber throughout the neck  RIGHT EXTRACRANIAL CAROTID SEGMENT:  Normal caliber common carotid artery  Normal bifurcation and cervical internal carotid artery  No stenosis or dissection  LEFT EXTRACRANIAL CAROTID SEGMENT:  Normal caliber common carotid artery  Normal bifurcation and cervical internal carotid artery  No stenosis or dissection  NASCET criteria was used to determine the degree of internal carotid artery diameter stenosis  INTRACRANIAL VASCULATURE INTERNAL CAROTID ARTERIES:  Moderate to severe plaque stenosis bilateral cavernous carotid siphon  4 mm right supraclinoid ICA aneurysm  Normal ophthalmic artery origins  Normal ICA terminus  ANTERIOR CIRCULATION:  Symmetric A1 segments and anterior cerebral arteries with normal enhancement  Normal anterior communicating artery   MIDDLE CEREBRAL ARTERY CIRCULATION:  M1 segment and middle cerebral artery branches demonstrate normal enhancement bilaterally  DISTAL VERTEBRAL ARTERIES:  Normal distal vertebral arteries  Posterior inferior cerebellar artery origins are normal  Normal vertebral basilar junction  BASILAR ARTERY:  Basilar artery is normal in caliber  Normal superior cerebellar arteries  POSTERIOR CEREBRAL ARTERIES: Both posterior cerebral arteries arises from the basilar tip  Both arteries demonstrate normal enhancement  Normal posterior communicating arteries  VENOUS STRUCTURES:  Normal  NON VASCULAR ANATOMY BONY STRUCTURES:  No acute osseous abnormality  SOFT TISSUES OF THE NECK:  Normal  THORACIC INLET:  Upper lobe scarring near the apex can be correlated with nonemergent outpatient unenhanced chest CT in 12 months  Impression: Moderate to severe plaque stenosis bilateral cavernous ICA carotid siphon  4 mm right supraclinoid ICA aneurysm  I personally discussed this study with neurologist  on 7/29/2022 at 5:28 PM  Workstation performed: [id number]     Echo complete w/ contrast if indicated    Result Date: 7/31/2022  Narrative: Kylah Vásquez  Left Ventricle: Left ventricular cavity size is normal  Wall thickness is upper normal  The left ventricular ejection fraction is 65%  Systolic function is normal  Wall motion is normal  Diastolic function is mildly abnormal, consistent with grade I (abnormal) relaxation    Left Atrium: The atrium is mildly dilated    Aortic Valve: There is mild to moderate regurgitation    Mitral Valve: There is trace regurgitation    Tricuspid Valve: There is mild regurgitation     Pulmonary Artery: The pulmonary artery systolic pressure is normal

## 2022-08-25 NOTE — H&P (VIEW-ONLY)
[facility] - Outpatient Consultation  Jami Resendiz 78 y o  female MRN: [id number]  Encounter: [id number]          ASSESSMENT AND PLAN:      1  Screen for colon cancer  Patient reports previous colonoscopy over 10 years ago  Report not available  Patient is overdue for colon cancer screening  Patient is on no blood thinners except for 81 mg of aspirin daily  - Ambulatory referral for colonoscopy  - Colonoscopy; schedule for colonoscopy  Prep and procedure explained to patient in detail  Further recommendations pending results of colonoscopy  -MiraLax and Dulcolax prep for colonoscopy    Follow-up pending results of colonoscopy     ______________________________________________________________________    HPI:  Matt Le is a 46-year-old Italian-speaking female who presents to office for colon cancer screening  Patient denies any GI issues except for intermittent epic of nausea  Patient denies vomiting, acid reflux, heartburn, dysphagia, epigastric abdominal pain  Patient denies blood in stool, blood from rectal area, or black tarry stool  Patient bowel patterns are regular  Denies constipation or diarrhea  Per patient last colonoscopy done over 10 years ago  Review lab work done July 2022 shows hemoglobin 11 7 and within normal limits  BMP within normal limits except for chloride 110 and calcium 7 9  Results are unknown  No report available  Patient does not smoke  No family history of gastric or colon cancer  REVIEW OF SYSTEMS:    CONSTITUTIONAL: Denies any fever, chills, rigors, and weight loss  HEENT: No earache or tinnitus  Denies hearing loss or visual disturbances  CARDIOVASCULAR: No chest pain or palpitations  RESPIRATORY: Denies any cough, hemoptysis, shortness of breath or dyspnea on exertion  GASTROINTESTINAL: As noted in the History of Present Illness  GENITOURINARY: No problems with urination   Denies any hematuria or dysuria  NEUROLOGIC: No dizziness or vertigo, denies headaches  MUSCULOSKELETAL: Denies any muscle or joint pain  SKIN: Denies skin rashes or itching  ENDOCRINE: Denies excessive thirst  Denies intolerance to heat or cold  PSYCHOSOCIAL: Denies depression or anxiety  Denies any recent memory loss  Historical Information   Past Medical History:   Diagnosis Date    Hypertension     Kidney stones      History reviewed  No pertinent surgical history  Social History   Social History     Substance and Sexual Activity   Alcohol Use Never     Social History     Substance and Sexual Activity   Drug Use Never     Social History     Tobacco Use   Smoking Status Never Smoker   Smokeless Tobacco Never Used     Family History   Problem Relation Age of Onset    No Known Problems Mother     No Known Problems Father        Meds/Allergies       Current Outpatient Medications:     amLODIPine (NORVASC) 5 mg tablet    aspirin (ECOTRIN LOW STRENGTH) 81 mg EC tablet    atorvastatin (LIPITOR) 80 mg tablet    lisinopril (ZESTRIL) 5 mg tablet    oxybutynin (DITROPAN-XL) 5 mg 24 hr tablet    No Known Allergies        Objective     Blood pressure 109/75, pulse 78, height 5' (1 524 m), weight 78 7 kg (173 lb 9 6 oz)  Body mass index is 33 9 kg/m²  PHYSICAL EXAM:      General Appearance:   Alert, cooperative, no distress   HEENT:   Normocephalic, atraumatic, anicteric      Neck:  Supple, symmetrical, trachea midline   Lungs:   Clear to auscultation bilaterally; no rales, rhonchi or wheezing; respirations unlabored    Heart[de-identified]   Regular rate and rhythm; no murmur, rub, or gallop     Abdomen:   Soft, mild abdominal tenderness to right lower quadrant with palpation, non-distended; normal bowel sounds; no masses, no organomegaly    Genitalia:   Deferred    Rectal:   Deferred    Extremities:  No cyanosis, clubbing or edema    Pulses:  2+ and symmetric    Skin:  No jaundice, rashes, or lesions    Lymph nodes:  No palpable cervical lymphadenopathy        Lab Results:   No visits with results within 1 Day(s) from this visit  Latest known visit with results is:   Appointment on 08/16/2022   Component Date Value    Color, UA 08/16/2022 Yellow     Clarity, UA 08/16/2022 Turbid     Specific Gravity, UA 08/16/2022 1 024     pH, UA 08/16/2022 5 5     Leukocytes, UA 08/16/2022 Large (A)    Nitrite, UA 08/16/2022 Positive (A)    Protein, UA 08/16/2022 Trace (A)    Glucose, UA 08/16/2022 Negative     Ketones, UA 08/16/2022 Negative     Urobilinogen, UA 08/16/2022 <2 0     Bilirubin, UA 08/16/2022 Negative     Occult Blood, UA 08/16/2022 Small (A)    RBC, UA 08/16/2022 4-10 (A)    WBC, UA 08/16/2022 Innumerable (A)    Epithelial Cells 08/16/2022 Occasional     Bacteria, UA 08/16/2022 Moderate (A)    MUCUS THREADS 08/16/2022 Innumerable (A)    Hyaline Casts, UA 08/16/2022 0-3 (A)    Ca Oxalate Lacy, UA 08/16/2022 Innumerable (A)    WBC Clumps 08/16/2022 Present     Urine Culture 08/16/2022 >100,000 cfu/ml Escherichia coli (A)         Radiology Results:   MRI brain wo contrast    Result Date: 7/30/2022  Narrative: MRI BRAIN WITHOUT CONTRAST INDICATION: Stroke-like symptoms  Right-sided numbness  Imbalance  Weakness  COMPARISON:   3/9/2015; 7/29/2022; H&P 7/29/2022 TECHNIQUE:  Sagittal T1, axial T2, axial FLAIR, axial T1, axial [place] and axial diffusion imaging  IMAGE QUALITY:  Diagnostic  FINDINGS: BRAIN PARENCHYMA:  There is no discrete mass, mass effect or midline shift  There is no intracranial hemorrhage  There is no evidence of acute infarction and diffusion imaging is unremarkable  Small scattered hyperintensities on T2/FLAIR imaging are noted in the periventricular and subcortical white matter demonstrating an appearance that is statistically most likely to represent mild microangiopathic change    Chronic left basal ganglia lacunar infarction redemonstrated with peripheral hemosiderin staining indicative of old hemorrhagic lacunar infarction  No acute intracranial hemorrhage currently  VENTRICLES:  Mild ex vacuo dilatation left lateral ventricle  SELLA AND PITUITARY GLAND:  Normal  ORBITS:  Normal  PARANASAL SINUSES:  Normal  VASCULATURE:  Evaluation of the major intracranial vasculature demonstrates appropriate flow voids  CALVARIUM AND SKULL BASE:  Normal  EXTRACRANIAL SOFT TISSUES:  Normal      Impression: 1  No acute infarction, intracranial hemorrhage or mass effect  2   Mild, chronic microangiopathy is similar to the previous study as is a small chronic left basal ganglia lacunar infarction  Workstation performed: [id number]     CT stroke alert brain    Result Date: 7/29/2022  Narrative: CT BRAIN - STROKE ALERT PROTOCOL INDICATION:   Stroke Alert  COMPARISON:  None  TECHNIQUE:  CT examination of the brain was performed  In addition to axial images, coronal reformatted images were created and submitted for interpretation  Radiation dose length product (DLP) for this visit:  861 mGy-cm   This examination, like all CT scans performed in the [facility], was performed utilizing techniques to minimize radiation dose exposure, including the use of iterative reconstruction and automated exposure control  IMAGE QUALITY:  Diagnostic  FINDINGS:  PARENCHYMA:  No intracranial mass, mass effect or midline shift  No CT signs of acute infarction  No acute parenchymal hemorrhage  Old left basal ganglia/anterior limb internal capsule infarct  VENTRICLES AND EXTRA-AXIAL SPACES:  Normal for patient's age  VISUALIZED ORBITS AND PARANASAL SINUSES:  Unremarkable  CALVARIUM AND EXTRACRANIAL SOFT TISSUES:   Normal      Impression: No acute intracranial abnormality    I personally discussed this study with neurologist  on 7/29/2022 at 5:28 PM  Workstation performed: [id number]     CTA stroke alert (head/neck)    Result Date: 7/29/2022  Narrative: CTA NECK AND BRAIN WITH CONTRAST INDICATION: Stroke Alert COMPARISON: None  TECHNIQUE:   Post contrast imaging was performed after administration of iodinated contrast through the neck and brain  Post contrast axial 0 625 mm images timed to opacify the arterial system  3D rendering was performed on an independent workstation  MIP reconstructions performed  Coronal reconstructions were performed of the noncontrast portion of the brain  Radiation dose length product (DLP) for this visit:  461 mGy-cm   This examination, like all CT scans performed in the [facility], was performed utilizing techniques to minimize radiation dose exposure, including the use of iterative reconstruction and automated exposure control  IV Contrast:  85 mL of iohexol (OMNIPAQUE)  IMAGE QUALITY:   Diagnostic FINDINGS: CERVICAL VASCULATURE AORTIC ARCH AND GREAT VESSELS:  Mild atherosclerotic disease of the arch, proximal great vessels and visualized subclavian vessels  No significant stenosis  RIGHT VERTEBRAL ARTERY CERVICAL SEGMENT:  Normal origin  The vessel is normal in caliber throughout the neck  LEFT VERTEBRAL ARTERY CERVICAL SEGMENT:  Normal origin  The vessel is normal in caliber throughout the neck  RIGHT EXTRACRANIAL CAROTID SEGMENT:  Normal caliber common carotid artery  Normal bifurcation and cervical internal carotid artery  No stenosis or dissection  LEFT EXTRACRANIAL CAROTID SEGMENT:  Normal caliber common carotid artery  Normal bifurcation and cervical internal carotid artery  No stenosis or dissection  NASCET criteria was used to determine the degree of internal carotid artery diameter stenosis  INTRACRANIAL VASCULATURE INTERNAL CAROTID ARTERIES:  Moderate to severe plaque stenosis bilateral cavernous carotid siphon  4 mm right supraclinoid ICA aneurysm  Normal ophthalmic artery origins  Normal ICA terminus  ANTERIOR CIRCULATION:  Symmetric A1 segments and anterior cerebral arteries with normal enhancement  Normal anterior communicating artery   MIDDLE CEREBRAL ARTERY CIRCULATION:  M1 segment and middle cerebral artery branches demonstrate normal enhancement bilaterally  DISTAL VERTEBRAL ARTERIES:  Normal distal vertebral arteries  Posterior inferior cerebellar artery origins are normal  Normal vertebral basilar junction  BASILAR ARTERY:  Basilar artery is normal in caliber  Normal superior cerebellar arteries  POSTERIOR CEREBRAL ARTERIES: Both posterior cerebral arteries arises from the basilar tip  Both arteries demonstrate normal enhancement  Normal posterior communicating arteries  VENOUS STRUCTURES:  Normal  NON VASCULAR ANATOMY BONY STRUCTURES:  No acute osseous abnormality  SOFT TISSUES OF THE NECK:  Normal  THORACIC INLET:  Upper lobe scarring near the apex can be correlated with nonemergent outpatient unenhanced chest CT in 12 months  Impression: Moderate to severe plaque stenosis bilateral cavernous ICA carotid siphon  4 mm right supraclinoid ICA aneurysm  I personally discussed this study with neurologist  on 7/29/2022 at 5:28 PM  Workstation performed: [id number]     Echo complete w/ contrast if indicated    Result Date: 7/31/2022  Narrative: Ashli Rahman  Left Ventricle: Left ventricular cavity size is normal  Wall thickness is upper normal  The left ventricular ejection fraction is 65%  Systolic function is normal  Wall motion is normal  Diastolic function is mildly abnormal, consistent with grade I (abnormal) relaxation    Left Atrium: The atrium is mildly dilated    Aortic Valve: There is mild to moderate regurgitation    Mitral Valve: There is trace regurgitation    Tricuspid Valve: There is mild regurgitation     Pulmonary Artery: The pulmonary artery systolic pressure is normal

## 2022-08-25 NOTE — TELEPHONE ENCOUNTER
Scheduled date of colonoscopy (as of today): 9/16/22  Physician performing colonoscopy: Dr Melanie Allen   Location of colonoscopy: [facility]  Bowel prep reviewed with patient: Miralax w/ dul (Czech)  Instructions reviewed with patient by: ls   Clearances: n/a

## 2022-08-26 ENCOUNTER — OFFICE VISIT (OUTPATIENT)
Dept: OCCUPATIONAL THERAPY | Facility: CLINIC | Age: 77
End: 2022-08-26
Payer: MEDICARE

## 2022-08-26 ENCOUNTER — OFFICE VISIT (OUTPATIENT)
Dept: PHYSICAL THERAPY | Facility: CLINIC | Age: 77
End: 2022-08-26
Payer: MEDICARE

## 2022-08-26 DIAGNOSIS — I63.20 CEREBROVASCULAR ACCIDENT (CVA) DUE TO OCCLUSION OF PRECEREBRAL ARTERY (HCC): Primary | ICD-10-CM

## 2022-08-26 PROCEDURE — 97112 NEUROMUSCULAR REEDUCATION: CPT | Performed by: PHYSICAL THERAPIST

## 2022-08-26 PROCEDURE — 97112 NEUROMUSCULAR REEDUCATION: CPT | Performed by: OCCUPATIONAL THERAPIST

## 2022-08-26 PROCEDURE — 97530 THERAPEUTIC ACTIVITIES: CPT | Performed by: OCCUPATIONAL THERAPIST

## 2022-08-26 NOTE — PROGRESS NOTES
Daily Note     Today's date: 2022  Patient name: Ilda Benjamin  : [id number]  MRN: [id number]  Referring provider: Laureen Bryan MD  Dx:   Encounter Diagnosis   Name Primary?  Cerebrovascular accident (CVA) due to occlusion of precerebral artery ([facility] [address] ) Yes       Start Time: 0806  Stop Time: 0845  Total time in clinic (min): 39 minutes    Precautions: Armenian speaking, falls, mod severity b/l cataracts  Visit 4  PN due   POC valid 22  No Auth- BOMN    Subjective: Pt reports that she has been doing theraputty HEP  Objective: See treatment below  Theapeutic Activities:  -Memory mixup picture categorization and recall activity  Pt wrote names of each item and noted with spelling errors for   Immediate recall   NMRE:  -Shoulder flexion b/l with 2-4 lbs, initially used 2lb dumbbells, however pt c/o pain in R upper arm (not shoulder)  Graded down to 2-3lb dowel  Completed 3x10 to improve Rue motor control, b/l coordination, R proximal strength   -Scapular rows with green theraband around 1lb dowel 3x10 focusing on RUE motor control, b/l coordination, strengthening   -Qbitz with 1lb wrist weight on RUE and pt holding/manipulating 2+ pieces at a time to address dexterity and [facility] for IADLs  Required cues for correcting 6 errors  Assessment: Tolerated treatment well  Pt demonstrated difficulty with  and serial subtraction tasks  Pt would benefit from continued OT services to address RUE function, cognition for life roles  Plan: Continued skilled OT per POC

## 2022-08-26 NOTE — PROGRESS NOTES
Daily Note     Today's date: 2022  Patient name: Burman Dakins  : [birth year]  MRN: [id number]  Referring provider: Ian Sullivan MD  Dx:   Encounter Diagnosis     ICD-10-CM    1  Cerebrovascular accident (CVA) due to occlusion of precerebral artery (HCC)  I63 20                   Subjective: Patient reports from OT session with no new issues or complaints  Objective: See treatment diary below    NMR:  - Step taps foam pad 20 reps R and L  - FTEC foam pad 30 sec x 3 reps   - STS 2 x 10 reps    ( 2 lb ankle wt on right side)  - p-ball kicking 100 feet down and back x 2 FWD direction  - p-ball kicking 100 feet down and back x 2 Backwards  Direction  - P ball bounce with focus on right hand 100 feet x 2   - Standing hip flexion with 3 sec hold 2 x 10 reps   - standing hip abd with 3 sec hold 2 x 10 reps   - standing hip ext with 3 sec hold 2 x 10 reps       Access Code: [email or web address]  URL: [email or web address]  Date: 2022  Prepared by: Samia Julio C    Exercises  · Sit to Stand - 1 x daily - 7 x weekly - 2 sets - 10 reps  · Standing March with Counter Support - 1 x daily - 7 x weekly - 2 sets - 10 reps - 3 sec hold  · Standing Hip Abduction with Counter Support - 1 x daily - 7 x weekly - 2 sets - 10 reps - 3 sec hold  · Standing Hip Extension with Counter Support - 1 x daily - 7 x weekly - 2 sets - 10 reps - 3 sec hold  · Gastroc Stretch on Wall - 1 x daily - 7 x weekly - 3 sets - 30 sec hold    - Ambulation w/ 5# R ankle weight 2 x 100 ft    Assessment: Patient tolerated treatment session well today with focus dynamic activity related to HIGT  Had good tolerance with gait and p ball activity which challenged her physically  No loss of balance with activity just fatigue  reviewed standing 3 way hip for hold time and glut contraction on stance leg    Patient will continue to benefit from skilled OPPT services to address deficits in gait, balance, and strength in order to maximize functional mobility post-TIA  Plan: Continue per plan of care  Progress treatment as tolerated  POC expires Auth Status Total     Start date  Expiration date PT/OT + Visit Limit?  Co-Insurance   11/11/22 NA NA NA NA BOMN, PT + OT No                                           Outcome Measures Initial Eval  8/19/2022 & DN 8/23/22        5xSTS 14 25 sec,   2 UE         TUG 15 01 sec        10 meter 0 8 m/s        LEACH 41/56        FGA 19/30        DGI 16/24        MiniBEST 13/28        6MWT 840 ft

## 2022-08-30 ENCOUNTER — OFFICE VISIT (OUTPATIENT)
Dept: PHYSICAL THERAPY | Facility: CLINIC | Age: 77
End: 2022-08-30
Payer: MEDICARE

## 2022-08-30 ENCOUNTER — OFFICE VISIT (OUTPATIENT)
Dept: OCCUPATIONAL THERAPY | Facility: CLINIC | Age: 77
End: 2022-08-30
Payer: MEDICARE

## 2022-08-30 DIAGNOSIS — I63.20 CEREBROVASCULAR ACCIDENT (CVA) DUE TO OCCLUSION OF PRECEREBRAL ARTERY (HCC): Primary | ICD-10-CM

## 2022-08-30 PROCEDURE — 97112 NEUROMUSCULAR REEDUCATION: CPT

## 2022-08-30 PROCEDURE — 97112 NEUROMUSCULAR REEDUCATION: CPT | Performed by: OCCUPATIONAL THERAPIST

## 2022-08-30 NOTE — PROGRESS NOTES
Daily Note     Today's date: 2022  Patient name: Angella Raymond  :   MRN: [id number]  Referring provider: Fito Bentley MD  Dx:   Encounter Diagnosis     ICD-10-CM    1  Cerebrovascular accident (CVA) due to occlusion of precerebral artery (HCC)  I63 20                   Subjective: Patient reports from OT session with no new issues or complaints  Objective: See treatment diary below    NMR (4# R ankle weight):  - STS from standard chair + foam pad on chair and one under (L) foot 2 x 10  - Ambulation with posterior resistance (yellow) 2 x 100 ft  - FWD step ups (6") x 20 B/L  - Step taps (6") from foam x 20 B/L  - FWD/LAT tabitha negotiation (6") x 3 cycles down/back    Treadmill training - Peak HR: 126 bpm  - 0-4 mins @ 1 0-1 2 mph, 0% incline  - Cool down @ 0 9 mph, 0% incline x 2 mins    Assessment: Patient tolerated treatment session well today with continued focus on functional strength, gait, and balance training  Significant hesitancy with performing resisted ambulation, stating an increased fear of falling  Trialed treadmill training for improved gait mechanics and increased number of steps achieved in treatment session to maximize blood flow to area of brain affected by TIA as supported by current research for post-CVA recovery  Patient will continue to benefit from skilled OPPT services to address deficits in gait, balance, and strength in order to maximize functional mobility post-TIA  Plan: Continue per plan of care  Progress treatment as tolerated  POC expires Auth Status Total     Start date  Expiration date PT/OT + Visit Limit?  Co-Insurance   22 NA NA NA NA BOMN, PT + OT No                                           Outcome Measures Initial Eval  2022 & DN 22        5xSTS 14 25 sec,   2 UE         TUG 15 01 sec        10 meter 0 8 m/s        LEACH 41/56        FGA         DGI         MiniBEST         6MWT 840 ft

## 2022-08-30 NOTE — PROGRESS NOTES
Daily Note     Today's date: 2022  Patient name: Iesha Gunn  :   MRN: [id number]  Referring provider: Shefali Kendall MD  Dx:   Encounter Diagnosis   Name Primary?  Cerebrovascular accident (CVA) due to occlusion of precerebral artery ([address] ) Yes       Start Time: 0811  Stop Time: 0845  Total time in clinic (min): 34 minutes    Precautions: Japanese speaking, falls, mod severity b/l cataracts  Visit 6  PN due   POC valid 22  No Auth- BOMN    Subjective: Pt reports that she has been doing theraputty HEP  Objective: See treatment below  Theapeutic Activities:  -Discussed recommendation for ST evaluation with pt and her daughter and they were agreeable  NMRE:  -Modified push-ups at raised EOM 2x10 for RUE proprioceptive input for improved motor control   -Magneciser 2x minutes forward, 2 minutes reverse focusing on motor control for scapular protraction/retraction, reciprocal coordination, and muscular endurance   -While standing on foam pad pt completed marble board task picking up a handful of marbles at a time and placing on board 1 at a time focusing on palm to finger translation, turning on compliant surface to address dynamic standing balance  Pt had 1 slight LOB and recovered with CGA  -While seated pt used tripod tweezers to  marbles from board and transfer to container  Graded up to dual tasking with addition for different marble colors  Pt required extra time and Josefina for math component  Assessment: Tolerated treatment well  Pt demonstrated difficulty with  and serial subtraction tasks  Pt would benefit from continued OT services to address RUE function, cognition for life roles  Plan: Continued skilled OT per POC

## 2022-08-31 ENCOUNTER — OFFICE VISIT (OUTPATIENT)
Dept: NEUROSURGERY | Facility: CLINIC | Age: 77
End: 2022-08-31
Payer: MEDICARE

## 2022-08-31 VITALS
SYSTOLIC BLOOD PRESSURE: 118 MMHG | TEMPERATURE: 98.2 F | WEIGHT: 172 LBS | HEIGHT: 60 IN | BODY MASS INDEX: 33.77 KG/M2 | OXYGEN SATURATION: 97 % | DIASTOLIC BLOOD PRESSURE: 76 MMHG | HEART RATE: 75 BPM

## 2022-08-31 DIAGNOSIS — I67.1 INTRACRANIAL ANEURYSM: ICD-10-CM

## 2022-08-31 PROCEDURE — 99202 OFFICE O/P NEW SF 15 MIN: CPT | Performed by: NURSE PRACTITIONER

## 2022-08-31 NOTE — ASSESSMENT & PLAN NOTE
Patient seen in outpatient office today as a new patient for further workup and evaluation of incidental 4 mm right supraclinoid ICA aneurysm  · This was an incidental finding when patient presented to the ED on 07/29 with right-sided numbness, weakness, and gait imbalance  · Patient underwent stroke workup and was started on aspirin and statin  Imaging reviewed with Dr Hurtado:    CTA head and neck w/wo 07/29/2022: Moderate to severe plaque stenosis bilateral cavernous ICA carotid siphon  4 mm right supraclinoid ICA aneurysm  Plan:  · Reviewed imaging with patient and daughter, daughter used as  for visit today   · Recommend continuing healthy habits such as eating a balanced diet and exercise regularly  · Extensively discussed natural history of aneurysms  Discussed modifiable risk factors including hypertension, hyperlipidemia, and smoking  Patient does endorse history of hypertension hyperlipidemia on medication  She denies smoking history  Patient denies any family history of aneurysms or sudden death but is unsure of her mother's family history  · Discussed signs symptoms of aneurysm rupture including severe, sudden onset headache continent neck pain, nausea vomiting, and seizure  Reiterated that the symptoms should prompt patient visit in emergency department immediately  · Spoke with patient and gave her the choice to either follow-up in 1 year with repeat imaging or follow-up p r n  Felton Ruiz Patient and her daughter both decided for follow-up p r n  or if symptoms worsen  · Continue to follow up with Neurology for stroke management  · Patient made aware to seek care sooner if she develops any new or worsening neurological changes or red flag signs   · Patient made aware to contact Neurosurgery with any further questions or concerns

## 2022-08-31 NOTE — PROGRESS NOTES
Neurosurgery Office Note  Ese Mclean 78 y o  female MRN: [id number]      Assessment/Plan     Intracranial aneurysm  Patient seen in outpatient office today as a new patient for further workup and evaluation of incidental 4 mm right supraclinoid ICA aneurysm  · This was an incidental finding when patient presented to the ED on 07/29 with right-sided numbness, weakness, and gait imbalance  · Patient underwent stroke workup and was started on aspirin and statin  Imaging reviewed with Dr Hurtado:    CTA head and neck w/wo 07/29/2022: Moderate to severe plaque stenosis bilateral cavernous ICA carotid siphon  4 mm right supraclinoid ICA aneurysm  Plan:  · Reviewed imaging with patient and daughter, daughter used as  for visit today   · Recommend continuing healthy habits such as eating a balanced diet and exercise regularly  · Extensively discussed natural history of aneurysms  Discussed modifiable risk factors including hypertension, hyperlipidemia, and smoking  Patient does endorse history of hypertension hyperlipidemia on medication  She denies smoking history  Patient denies any family history of aneurysms or sudden death but is unsure of her mother's family history  · Discussed signs symptoms of aneurysm rupture including severe, sudden onset headache continent neck pain, nausea vomiting, and seizure  Reiterated that the symptoms should prompt patient visit in emergency department immediately  · Spoke with patient and gave her the choice to either follow-up in 1 year with repeat imaging or follow-up p r n  Melissa Loyola Patient and her daughter both decided for follow-up p r n  or if symptoms worsen  · Continue to follow up with Neurology for stroke management  · Patient made aware to seek care sooner if she develops any new or worsening neurological changes or red flag signs   · Patient made aware to contact Neurosurgery with any further questions or concerns         Diagnoses and all orders for this visit:    Intracranial aneurysm  -     Ambulatory Referral to Neurosurgery            CHIEF COMPLAINT    Chief Complaint   Patient presents with    Follow-up     From hospital admission    Aneurysm     Intracranial aneurysm       HISTORY    History of Present Illness     78y o  year old female with past medical history significant for hypertension, CVA, hyperlipidemia, and kidney stones  Patient seen in outpatient office today as a new patient for further workup and evaluation of incidental 4 mm right supraclinoid ICA aneurysms  This was an incidental finding when patient presented to the ED on 07/29 with right-sided numbness, weakness and gait imbalance  Patient underwent a stroke workup when aneurysm was found and she was started on aspirin and statin  Patient states she continues to have right-sided weakness  She is seeing PT and OT as an outpatient  She endorses some dizziness before or after she takes medication or when she initially gets up in the morning if she sits up too fast which does not last long  She also endorses right ear pain and tinnitus sounds like a buzzing in her ear  Patient does endorse having ear problems since childhood and this is something that comes and goes and has been ongoing  Patient also endorses overall fatigue  She denies any recent falls or traumas and states her balance has improved  She denies any headaches, blurry vision, chest pain, shortness of breath, abdominal pain, nausea, vomiting, diarrhea, no problems with bowel or bladder, no new weakness or numbness/tingling  Patient does endorse history of hypertension and hyperlipidemia on medication  She denies any smoking history  She denies any family history of aneurysms or sudden death but states she is unsure of her mother's family history  HPI    See Discussion    REVIEW OF SYSTEMS    Review of Systems   Constitutional: Positive for activity change (too tired to do anything) and fatigue  HENT: Positive for ear pain (mild pain in right ear, started last night; always happens after sound in ear) and tinnitus (comes and goes, episode started last night, not especially loud)  Eyes: Negative  Respiratory: Positive for cough (when laying down)  Cardiovascular: Negative  Gastrointestinal: Negative  Endocrine: Positive for cold intolerance  Genitourinary: Positive for urgency  Musculoskeletal: Positive for gait problem (sometimes has trouble getting out of bed d/t dizziness, has to lie back down until it passes; loses balance occasionally d/t dizziness, no falls, catches herself) and myalgias (cramping in right calf)  Skin: Negative  Allergic/Immunologic: Negative  Neurological: Positive for dizziness (once a week) and weakness (generalized, R>L, decreased motor skills on right, decreased walking on right)  Hematological: Negative  Psychiatric/Behavioral: Positive for sleep disturbance (coughing when laying down, nocturia)  All other systems reviewed and are negative  ROS reviewed with patient and agree and changes were made as needed    Meds/Allergies     Current Outpatient Medications   Medication Sig Dispense Refill    amLODIPine (NORVASC) 5 mg tablet Take 1 tablet (5 mg total) by mouth daily 30 tablet 2    aspirin (ECOTRIN LOW STRENGTH) 81 mg EC tablet Take 1 tablet (81 mg total) by mouth daily 30 tablet 2    atorvastatin (LIPITOR) 80 mg tablet Take 0 5 tablets (40 mg total) by mouth every evening 30 tablet 2    lisinopril (ZESTRIL) 5 mg tablet Take 1 tablet (5 mg total) by mouth daily 30 tablet 1    oxybutynin (DITROPAN-XL) 5 mg 24 hr tablet Take 1 tablet (5 mg total) by mouth daily at bedtime Take 2 5mg (0 5 tablet) at night (Patient not taking: Reported on 8/31/2022) 30 tablet 1     No current facility-administered medications for this visit         No Known Allergies    PAST HISTORY    Past Medical History:   Diagnosis Date    Hypertension     Kidney stones        History reviewed  No pertinent surgical history  Social History     Tobacco Use    Smoking status: Never Smoker    Smokeless tobacco: Never Used   Vaping Use    Vaping Use: Never used   Substance Use Topics    Alcohol use: Never    Drug use: Never       Family History   Problem Relation Age of Onset    No Known Problems Mother     No Known Problems Father          Above history personally reviewed  EXAM    Vitals:Blood pressure 118/76, pulse 75, temperature 98 2 °F (36 8 °C), temperature source Temporal, height 4' 11 5" (1 511 m), weight 78 kg (172 lb), SpO2 97 %  ,Body mass index is 34 16 kg/m²  Physical Exam  Vitals reviewed  Constitutional:       General: She is awake  She is not in acute distress  Appearance: Normal appearance  She is not ill-appearing  HENT:      Head: Normocephalic and atraumatic  Eyes:      Extraocular Movements: Extraocular movements intact and EOM normal       Conjunctiva/sclera: Conjunctivae normal       Pupils: Pupils are equal, round, and reactive to light  Cardiovascular:      Rate and Rhythm: Normal rate  Pulmonary:      Effort: Pulmonary effort is normal  No respiratory distress  Chest:      Chest wall: No tenderness  Abdominal:      General: There is no distension  Palpations: Abdomen is soft  Tenderness: There is no abdominal tenderness  Musculoskeletal:         General: Normal range of motion  Cervical back: Normal range of motion and neck supple  Skin:     General: Skin is warm and dry  Neurological:      Mental Status: She is alert and oriented to person, place, and time  Coordination: Finger-Nose-Finger Test normal       Gait: Gait is intact  Deep Tendon Reflexes:      Reflex Scores:       Bicep reflexes are 2+ on the right side and 2+ on the left side  Patellar reflexes are 2+ on the right side and 2+ on the left side    Psychiatric:         Attention and Perception: Attention and perception normal          Mood and Affect: Mood and affect normal          Speech: Speech normal          Behavior: Behavior normal  Behavior is cooperative  Thought Content: Thought content normal          Cognition and Memory: Cognition and memory normal          Judgment: Judgment normal          Neurologic Exam     Mental Status   Oriented to person, place, and time  Follows 2 step commands  Attention: normal  Concentration: normal    Speech: speech is normal   Level of consciousness: alert  Knowledge: good  Able to perform simple calculations  Able to name object  Normal comprehension  Cranial Nerves     CN III, IV, VI   Pupils are equal, round, and reactive to light  Extraocular motions are normal    CN III: no CN III palsy  CN VI: no CN VI palsy  Nystagmus: none   Diplopia: none  Conjugate gaze: present    CN V   Facial sensation intact  CN VII   Facial expression full, symmetric  CN VIII   CN VIII normal    Hearing: intact    CN IX, X   CN IX normal      CN XI   CN XI normal      CN XII   CN XII normal      Motor Exam   Muscle bulk: normal  Overall muscle tone: normal  Right arm pronator drift: absent  Left arm pronator drift: absent    Strength   Strength 5/5 except as noted  RUE 4/5 and R HF 3/5, KE/KF/DF/PF 4-/5     Sensory Exam   Left arm light touch: normal  Left leg light touch: normal  Proprioception normal    Decreased sensation to LT in RUE and RLE    JPS and DST intact     Gait, Coordination, and Reflexes     Gait  Gait: normal    Coordination   Finger to nose coordination: normal    Tremor   Resting tremor: absent  Intention tremor: absent  Action tremor: absent    Reflexes   Right biceps: 2+  Left biceps: 2+  Right patellar: 2+  Left patellar: 2+  Right Villa: absent  Left Villa: absent  Right ankle clonus: absent  Left ankle clonus: absent        MEDICAL DECISION MAKING    Imaging Studies:     No results found  I have personally reviewed pertinent reports     and I have personally reviewed pertinent films in PACS

## 2022-09-02 ENCOUNTER — APPOINTMENT (OUTPATIENT)
Dept: PHYSICAL THERAPY | Facility: CLINIC | Age: 77
End: 2022-09-02
Payer: MEDICARE

## 2022-09-02 ENCOUNTER — APPOINTMENT (OUTPATIENT)
Dept: OCCUPATIONAL THERAPY | Facility: CLINIC | Age: 77
End: 2022-09-02
Payer: MEDICARE

## 2022-09-06 ENCOUNTER — OFFICE VISIT (OUTPATIENT)
Dept: PHYSICAL THERAPY | Facility: CLINIC | Age: 77
End: 2022-09-06
Payer: MEDICARE

## 2022-09-06 ENCOUNTER — OFFICE VISIT (OUTPATIENT)
Dept: OCCUPATIONAL THERAPY | Facility: CLINIC | Age: 77
End: 2022-09-06
Payer: MEDICARE

## 2022-09-06 ENCOUNTER — EVALUATION (OUTPATIENT)
Dept: PHYSICAL THERAPY | Facility: CLINIC | Age: 77
End: 2022-09-06
Payer: MEDICARE

## 2022-09-06 DIAGNOSIS — I63.20 CEREBROVASCULAR ACCIDENT (CVA) DUE TO OCCLUSION OF PRECEREBRAL ARTERY (HCC): Primary | ICD-10-CM

## 2022-09-06 DIAGNOSIS — R35.0 URINARY FREQUENCY: ICD-10-CM

## 2022-09-06 DIAGNOSIS — R35.1 NOCTURIA: Primary | ICD-10-CM

## 2022-09-06 DIAGNOSIS — N39.41 URGE INCONTINENCE: ICD-10-CM

## 2022-09-06 PROCEDURE — 97140 MANUAL THERAPY 1/> REGIONS: CPT

## 2022-09-06 PROCEDURE — 97112 NEUROMUSCULAR REEDUCATION: CPT

## 2022-09-06 PROCEDURE — 97112 NEUROMUSCULAR REEDUCATION: CPT | Performed by: PHYSICAL THERAPIST

## 2022-09-06 PROCEDURE — 97116 GAIT TRAINING THERAPY: CPT | Performed by: PHYSICAL THERAPIST

## 2022-09-06 PROCEDURE — 97162 PT EVAL MOD COMPLEX 30 MIN: CPT

## 2022-09-06 NOTE — PROGRESS NOTES
Daily Note     Today's date: 2022  Patient name: Anu Corbett  :   MRN: [id number]  Referring provider: Shayne Ann MD  Dx:   Encounter Diagnosis     ICD-10-CM    1  Cerebrovascular accident (CVA) due to occlusion of precerebral artery ([address] )  I63 20        Start Time: 4607  Stop Time: 09  Total time in clinic (min): 45 minutes    Subjective: Patient reports from OT session with no new issues or complaints  Patient's son-in-law is present throughout to provide translation as patient is [place] Islander speaking  Objective: See treatment diary below    **4 lb right ankle weight donned entire session**    NMR:   - STS from standard chair + foam pad on chair and one under (L) foot 2 x 10  - FWD step ups (6") x 20 B/L  - FWD/LAT tabitha negotiation (6") x 3 cycles down/back  - Step taps (6") from foam x 20 B/L    GAIT:  - High knee stepping, 50 ft, 2 cycles; SBA-CGA  - Fwd Ambulation with VC for fast/slow, 50 ft, 4 cycles; SBA-CGA  - Bwd Ambulation with VC for "big steps", 50 ft, 2 cycles; SBA-CGA    Assessment: Patient tolerated treatment session well today with continued focus on functional strength, gait, and balance training  Patient required intermittent rest breaks throughout her session today  Trialed over ground ambulation drills for HIGT  Patient required SBA-CGA throughout due to unsteadiness  Patient was challenged to alternate between fast/slow ambulation with some accessory stepping to regain balance at times  She was challenged with backwards walking as well with patient reporting increase in dizziness needing therapist to steady patient prior to taking a seat  Patient will continue to benefit from skilled OPPT services to address deficits in gait, balance, and strength in order to maximize functional mobility post-TIA  Plan: Continue per plan of care  Progress treatment as tolerated  POC expires Auth Status Total     Start date  Expiration date PT/OT + Visit Limit? Co-Insurance   11/11/22 NA NA NA NA BOMN, PT + OT No                                           Outcome Measures Initial Eval  8/19/2022 & DN 8/23/22        5xSTS 14 25 sec,   2 UE         TUG 15 01 sec        10 meter 0 8 m/s        LEACH 41/56        FGA 19/30        DGI 16/24        MiniBEST 13/28        6MWT 840 ft

## 2022-09-06 NOTE — PROGRESS NOTES
Daily Note     Today's date: 2022  Patient name: Belkys Cooney  :   MRN: [id number]  Referring provider: Tianna Cat MD  Dx:   Encounter Diagnosis   Name Primary?  Cerebrovascular accident (CVA) due to occlusion of precerebral artery ([facility] [address] ) Yes                  Precautions: Latvian speaking, falls, mod severity b/l cataracts  Visit 7   PN due   POC valid 22  No Auth- BOMN    Subjective: Pt reports performing theraputty HEP ocationally     Objective: See treatment below  NMRE:  - Sock folding while standing x8 focusing on B/L [facility] and dexterity and static standing balance  - B/L PNF D1 and D2 patterns with yellow theraband sitting on yellow physioball x15 each focusing on B/L strengthening and motor coordination  Activity downgraded to sitting on EOM 2* balance deficits  Pt reported fatigue in B/L UE and slight pain in R shoulder with D2 PNF Pattern  Theo Lyons theraputty bead removal with 1 lb wrist weights on B/L hands for proprioceptive feedback  Pt focused on B/L [facility] and dexterity  Pt demonstrated ability to remove all bead and reported fatigue in B/L hands post exercise  - Honey tree naming 2 categories with B/L 1 lb wrist weights for proprioceptive feedback standing on foam  Pt focused on /[facility] and divided attention  Assessment: Tolerated treatment well  Pt demonstrated difficulty with balance 2* decreased core strength and increased digit fatigue post exercises  Pt would benefit from continued OT services to address RUE function, cognition for life roles  Plan: Continued skilled OT per POC

## 2022-09-06 NOTE — PROGRESS NOTES
PT Evaluation     Today's date: 2022  Patient name: Laura Marquez  :   MRN: [id number]  Referring provider: Lico Castrejon*  Dx:   Encounter Diagnosis     ICD-10-CM    1  Urinary frequency  R35 0 Ambulatory Referral to Physical Therapy   2  Urge incontinence  N39 41 Ambulatory Referral to Physical Therapy                  Assessment  Assessment details:   CASE SUMMARY:   Laura Marquez is a 78y o  year old female who reports onset of symptoms ~ 2 years ago    Patient describes symptoms as: bothersome, consistently interrupting sleep, and uncomfortable  Symptoms are : intermittent/constant  Elenor Smiling is limited in the following activities: sleeping as she wakes up multiple times a day  PMHx includes: See chart for full details with medications  Patient's clinical presentation is consistent with their referring diagnosis of: Nocturia  (primary encounter diagnosis)    Urinary frequency  Plan: Ambulatory Referral to Physical Therapy    Urge incontinence  Plan: Ambulatory Referral to Physical Therapy    POC was discussed and agreed upon with patient  Patient was educated on: pelvic floor muscle function, pelvic floor rehab process, prognosis and diagnosis, POC, and HEP  Patient vocalized a good understanding of  POC and HEP issued   Patient would benefit from skilled physical therapy services to address their aforementioned functional limitations and progress towards prior level of function and independence with home exercise program       Pelvic floor verbal consent and written consent signed and in chart  Patient deferred second person in room: YES        Impairments: abnormal muscle firing, abnormal muscle tone, activity intolerance, impaired physical strength, lacks appropriate home exercise program, poor posture  and poor body mechanics    Symptom irritability: highUnderstanding of Dx/Px/POC: good   Prognosis: good    Goals  STG:  -The patient will improve pelvic floor muscle strength 1 to 2 grade in 8 to 12 weeks  -The patient will improve pelvic floor muscle endurance to 2 to 3 seconds in 8 to 12 weeks  -The patient will reduce diastasis of the rectus abdominis by 1/2 to 1 finger widths in 8 to 12 weeks  -The patient will reduce number of pads to 3 per day  LT  The patient will be independent with HEP upon discharge  2  The patient will perform higher level activities and exercise without leaking upon discharge  Plan  Plan details: HEP development, stretching, strengthening, A/AA/PROM, joint mobilizations, posture education, STM/MI as needed to reduce muscle tension, muscle reeducation, patient has been educated in Dx, prognosis and plan of care and is in agreement  Patient would benefit from: PT eval, women's health eval and skilled physical therapy  Planned modality interventions: biofeedback, cryotherapy, ultrasound, TENS and hydrotherapy  Planned therapy interventions: abdominal trunk stabilization, activity modification, manual therapy, massage, motor coordination training, neuromuscular re-education, body mechanics training, patient education, postural training, self care, strengthening, therapeutic exercise, therapeutic activities, home exercise program, breathing training and flexibility  Frequency: 1x week  Duration in weeks: 8        PT Pelvic Floor Subjective:   History of Present Illness:   Pt reports that she has been dealing with increase urine frequency over the last two years and states that over the last few months symptoms have gotten worse  Pt reports that she get up anywhere from 4-6 times a night to use the bathroom  States that as she is making her way to the bathroom she does notice leakage as well and leaking with coughing and laughing  States that she has had this problem in the past however, it has never been this problematic   She states that she is having a hard time getting enough sleep and at times has had accidents in her bed  Denies any pain in her pelvic region, lumbar spine, or hips  Pt does report three prior pregnancies all vaginal deliveries  Recurrent probem    Quality of life: poor    Social Support:     Lives in:  [address] [place] [address]    Lives with:  Adult children    Relationship status:     Work status: unemployed and retired    Life stress severity: mild    History of Depression: no  Diet and Exercise:    Diet:balanced nutrition    Exercise type: no activity    Not exercising due to: not interested  OB/ gyn History    Gestational History:     Prior Pregnancy: Yes      Number of prior pregnancies: 3    Number of term pregnancies: 3    Delivery Type: vaginal delivery    Bladder Function:     Voiding Difficulties positive for: urgency, frequent urination and incomplete emptying      Voiding Difficulties comments:     Urinary leakage: urine leakage    Urinary leakage aggravated by: coughing and sneezing    Nocturia (episodes per night): 4 or more    Painful urination: No      Fluid Intake Type:  Coffee, tea, soda and juice  Bowel Function:     Bowel frequency: daily  Sexual Function:     Sexually Active:  Non-contributory  Pain:     No pain reported by patient  Treatments:     Previous treatment:  Physical therapy    Current treatment: physical therapy    Patient Goals:     Patient goals for therapy:  Improved quality of life, improved sleep, improved bladder or bowel function, improved comfort and fully empty bladder or bowels      Objective   Pelvic Floor Exam   Position: supine exam    Diastatis   Connective tissue integrity at linea alba: boggy  no tenderness at linea alba  unable to engage transverse abdominis     Skin inspection:   no scars present  General Perineum Exam:   Positive for gaping introitus       Visual Inspection of Perineum:   Excursion of perineal body in cephalad direction with contraction of pelvic floor muscles (PFM): unable and weak  Excursion of perineal body in caudal direction with relaxation of pelvic floor muscles (PFM): weak  Cotton swab test: non-tender  Cough reflex: cough reflex  Sphincter Tone Resting: weak  Sphincter Tone Squeeze: weak  Sensation: intact    Pelvic Organ Prolapse   Position: hook-lying  At rest: mild and mild  With bearing down: mild (>1cm from hymenal remnants) and moderate (to the level of hymenal remnants)    Pelvic Floor Muscle Exam     Palpation   No increased muscle tension in the bulbospongiosus, ischiocavernosus and super transverse perineal  Minimal increased muscle tension in the puborectalis and pubococcygeus  Moderate increased muscle tension in the iliococcygeus and coccygeus    Breathing pattern with contraction: holding breath  Pelvic floor muscle relaxation is delayed and incomplete  PERFECT Score   Power right: 1/5  Power left: 1/5    SMEG Biofeedback   Recruitment: slow  Holding ability: poor  Derecruitment:low               Precautions: CVA, fall risk, vision impairment, Serbian speaking only  Manuals 09/06/22            PFM assessment  Verbal and written consent provided -IM             Daughter explained to mother what exam entailed via phone- IM                                       Neuro Re-Ed             PFM supine biofeedback  req multi cues              engaging core and LE muscles to try and do a kegel               Diaphragmatic breathing  Req cues- IM                                                                 Ther Ex                                                                                                                     Ther Activity                                       Gait Training                                       Modalities

## 2022-09-07 ENCOUNTER — OFFICE VISIT (OUTPATIENT)
Dept: INTERNAL MEDICINE CLINIC | Facility: CLINIC | Age: 77
End: 2022-09-07
Payer: MEDICARE

## 2022-09-07 ENCOUNTER — TELEPHONE (OUTPATIENT)
Dept: NEUROLOGY | Facility: CLINIC | Age: 77
End: 2022-09-07

## 2022-09-07 VITALS
HEART RATE: 86 BPM | SYSTOLIC BLOOD PRESSURE: 120 MMHG | OXYGEN SATURATION: 97 % | WEIGHT: 173.2 LBS | TEMPERATURE: 98.2 F | HEIGHT: 60 IN | BODY MASS INDEX: 34 KG/M2 | DIASTOLIC BLOOD PRESSURE: 80 MMHG

## 2022-09-07 DIAGNOSIS — J02.9 SORE THROAT: Primary | ICD-10-CM

## 2022-09-07 DIAGNOSIS — N39.41 URGE INCONTINENCE: ICD-10-CM

## 2022-09-07 PROCEDURE — 99213 OFFICE O/P EST LOW 20 MIN: CPT | Performed by: INTERNAL MEDICINE

## 2022-09-07 PROCEDURE — U0005 INFEC AGEN DETEC AMPLI PROBE: HCPCS

## 2022-09-07 PROCEDURE — U0003 INFECTIOUS AGENT DETECTION BY NUCLEIC ACID (DNA OR RNA); SEVERE ACUTE RESPIRATORY SYNDROME CORONAVIRUS 2 (SARS-COV-2) (CORONAVIRUS DISEASE [COVID-19]), AMPLIFIED PROBE TECHNIQUE, MAKING USE OF HIGH THROUGHPUT TECHNOLOGIES AS DESCRIBED BY CMS-2020-01-R: HCPCS

## 2022-09-07 RX ORDER — BENZONATATE 200 MG/1
200 CAPSULE ORAL 3 TIMES DAILY PRN
Qty: 20 CAPSULE | Refills: 0 | Status: SHIPPED | OUTPATIENT
Start: 2022-09-07

## 2022-09-07 NOTE — PROGRESS NOTES
INTERNAL MEDICINE FOLLOW-UP OFFICE VISIT  [facility] INTERNAL MEDICINE [facility]    NAME: Char Eli  AGE: 78 y o  SEX: female  : 1945     DATE: 2022     Assessment and Plan:     1  Sore throat  Assessment & Plan: Onset 1 day ago  Associated with dry cough   Denies fever, chills, n/v/d, congestion, rhinorrhea, sick contacts   Trial of 1 hard candy with no relief     Plan-  Covid swab  Tessalon Perles TID as needed  Coricidin HBP OTC recommendation   Honey for throat sx      Orders:  -     benzonatate (TESSALON) 200 MG capsule; Take 1 capsule (200 mg total) by mouth 3 (three) times a day as needed for cough  -     COVID Only- Office Collect    2  Urge incontinence  Assessment & Plan:  Hx of UTI from last visit  Completed Macrobid 100mg for 5 days   Sx improved, decreased frequency  Denies suprapubic pain, dysuria, incontinence    Plan-  Continue PT exercises for pelvic floor          No follow-ups on file  Chief Complaint:     Chief Complaint   Patient presents with    Follow-up     Throat pain since last night        History of Present Illness:     Patient was seen and examined in the office with her son, who is translating for the patient  Patient is complaining of sore throat onset 1 day ago  Patient is also complaining of a dry cough  Patient reported that she tried 1 hard candy with no relief  Patient denies fever, chills, congestion, SOB, rhinorrhea, N/V/D, sick contacts, or any other sx at this time  Patient was initially scheduled for a follow up for a recent UTI, where she completed Macrobid for 5 days  Patient states that her sx have been improved regarding the urinary frequency and urgency  Patient denies dysuria, suprapubic pain, fever, foul smelling urine, or any other urinary sx        The following portions of the patient's history were reviewed and updated as appropriate: allergies, current medications, past family history, past medical history, past social history, past surgical history and problem list      Review of Systems:     Review of Systems   Constitutional: Negative for chills and fever  HENT: Positive for sore throat  Negative for congestion, ear pain and rhinorrhea  Eyes: Negative for pain and visual disturbance  Respiratory: Positive for cough  Negative for shortness of breath  Cardiovascular: Negative for chest pain and palpitations  Gastrointestinal: Negative for abdominal pain, diarrhea, nausea and vomiting  Genitourinary: Negative for dysuria, frequency, hematuria and urgency  Musculoskeletal: Negative for arthralgias and back pain  Skin: Negative for color change and rash  Neurological: Negative for dizziness, seizures, syncope and headaches  Psychiatric/Behavioral: Negative for confusion  All other systems reviewed and are negative  Problem List:     Patient Active Problem List   Diagnosis    Weakness of right hand    Hypertensive urgency    Intracranial aneurysm    Hyperlipidemia    Primary hypertension    Cerebrovascular accident (CVA) due to occlusion of precerebral artery ([facility] )    Screen for colon cancer    Urge incontinence        Objective:     /80 (BP Location: Left arm, Patient Position: Sitting, Cuff Size: Standard)   Pulse 86   Temp 98 2 °F (36 8 °C) (Tympanic)   Ht 4' 11 5" (1 511 m)   Wt 78 6 kg (173 lb 3 2 oz)   SpO2 97%   BMI 34 40 kg/m²     Physical Exam  Vitals and nursing note reviewed  Constitutional:       General: She is not in acute distress  Appearance: Normal appearance  She is well-developed  She is not ill-appearing, toxic-appearing or diaphoretic  HENT:      Head: Normocephalic and atraumatic  Mouth/Throat:      Mouth: Mucous membranes are moist       Pharynx: Oropharynx is clear  No oropharyngeal exudate or posterior oropharyngeal erythema  Eyes:      Extraocular Movements: Extraocular movements intact        Conjunctiva/sclera: Conjunctivae normal  Pupils: Pupils are equal, round, and reactive to light  Cardiovascular:      Rate and Rhythm: Normal rate and regular rhythm  Pulses: Normal pulses  Heart sounds: Normal heart sounds  No murmur heard  Pulmonary:      Effort: Pulmonary effort is normal  No respiratory distress  Breath sounds: Normal breath sounds  No wheezing  Chest:      Chest wall: No tenderness  Abdominal:      General: Bowel sounds are normal  There is no distension  Palpations: Abdomen is soft  Tenderness: There is no abdominal tenderness  Musculoskeletal:      Cervical back: Normal range of motion and neck supple  Right lower leg: No edema  Left lower leg: No edema  Skin:     General: Skin is warm and dry  Neurological:      General: No focal deficit present  Mental Status: She is alert and oriented to person, place, and time  Psychiatric:         Mood and Affect: Mood normal          Behavior: Behavior normal          Pertinent Laboratory/Diagnostic Studies:    Laboratory Results: I have personally reviewed the pertinent laboratory results/reports     Urinalysis:   Results from Last 12 Months   Lab Units 08/16/22  1123   COLOR UA  Yellow   CLARITY UA  Turbid   SPEC GRAV UA  1 024   PH UA  5 5   LEUKOCYTES UA  Large*   NITRITE UA  Positive*   GLUCOSE UA mg/dl Negative   KETONES UA mg/dl Negative   BILIRUBIN UA  Negative   BLOOD UA  Small*        Radiology/Other Diagnostic Testing Results: I have personally reviewed pertinent reports        Jens Gagonn MD  [facility] INTERNAL MEDICINE [place]

## 2022-09-07 NOTE — ASSESSMENT & PLAN NOTE
Hx of UTI from last visit  Completed Macrobid 100mg for 5 days   Sx improved, decreased frequency  Denies suprapubic pain, dysuria, incontinence    Plan-  Continue PT exercises for pelvic floor

## 2022-09-07 NOTE — TELEPHONE ENCOUNTER
Called pt for reminder of appt with Amy Winston on 09/21/22, pt's daughter answered and verified appt

## 2022-09-07 NOTE — ASSESSMENT & PLAN NOTE
Onset 1 day ago  Associated with dry cough   Denies fever, chills, n/v/d, congestion, rhinorrhea, sick contacts   Trial of 1 hard candy with no relief     Plan-  Covid swab  Tessalon Perles TID as needed  Coricidin HBP OTC recommendation   Honey for throat sx

## 2022-09-08 LAB — SARS-COV-2 RNA RESP QL NAA+PROBE: NEGATIVE

## 2022-09-09 ENCOUNTER — OFFICE VISIT (OUTPATIENT)
Dept: PHYSICAL THERAPY | Facility: CLINIC | Age: 77
End: 2022-09-09
Payer: MEDICARE

## 2022-09-09 ENCOUNTER — OFFICE VISIT (OUTPATIENT)
Dept: OCCUPATIONAL THERAPY | Facility: CLINIC | Age: 77
End: 2022-09-09
Payer: MEDICARE

## 2022-09-09 DIAGNOSIS — I63.20 CEREBROVASCULAR ACCIDENT (CVA) DUE TO OCCLUSION OF PRECEREBRAL ARTERY (HCC): Primary | ICD-10-CM

## 2022-09-09 PROCEDURE — 97112 NEUROMUSCULAR REEDUCATION: CPT

## 2022-09-09 PROCEDURE — 97112 NEUROMUSCULAR REEDUCATION: CPT | Performed by: OCCUPATIONAL THERAPIST

## 2022-09-09 NOTE — PROGRESS NOTES
Daily Note     Today's date: 2022  Patient name: Ese Mclean  :   MRN: [id number]  Referring provider: Allison Zarco MD  Dx:   Encounter Diagnosis   Name Primary?  Cerebrovascular accident (CVA) due to occlusion of precerebral artery ([facility] [address] ) Yes                  Precautions: Sami speaking, falls, mod severity b/l cataracts  Visit 8  PN due   POC valid 22  No Auth- BOMN    Subjective: Pt's daughter reports that pt is losing interest in doing physical activities     Objective: See treatment below  NMRE:  - Quadruped push-ups x10 focusing on RUE proprioceptive input for neuroplasticity  - Maze balance board completed x2 rounds at raised EOM focusing on RUE WB for proprioceptive input, b/l coordination, core strengthening for IADLs  Reported pain in elbows and R knee after activity  - Removing and replacing rubber bands on cylinder using R hand only focusing on [address] [place], dexterity, and pinch strengthening for IADLs  - While standing on airex pad pt completed R in hand manipulation with pixy cubes holding 2 cubes at a time  Pt required Josefina and mod cues for accurately copying design d/t  deficits  Completed to address R [address] [place], standing static balance for IADLs  Assessment: Tolerated treatment well  Pt demonstrated difficulty with motor planning during maze balance board,  deficits  Pt would benefit from continued OT services to address RUE function, cognition for life roles  Plan: Continued skilled OT per POC

## 2022-09-09 NOTE — PROGRESS NOTES
Daily Note     Today's date: 2022  Patient name: Ese Mclean  : 9/15/2016  MRN: [id number]  Referring provider: Allison Zarco MD  Dx:   Encounter Diagnosis     ICD-10-CM    1  Cerebrovascular accident (CVA) due to occlusion of precerebral artery (HCC)  I63 20                   Subjective: Patient reports with no new issues or complaints  Objective: See treatment diary below    **4 lb right ankle weight donned entire session**    NMR:   - STS from standard chair + foam pad on chair and one under (L) foot x 10  - Step taps (6") from foam x 2 minutes  - Step ups (6") from foam x 10 B/L   - Ambulation with posterior resistance (red) x 300 feet + random perturbations   - High knee marching 50 feet x 4 cycles  - FWD/BCK with posterior resistance x 10 cycles  - Tandem ambulation x 20 feet, PT CG   - STS x 10 (1 airex on chair)    Assessment: Patient tolerated treatment session well today with continued focus on functional strength, gait, and balance training  She continues to demonstrate fearfulness of falling but is able to effectively elicit strategies to maintain stance  Overall fatigue and slowed stepping when walking with posterior resistance  Notable challenge maintaining narrow KEYLA  Patient fatigued end of session  Patient will continue to benefit from skilled OPPT services to address deficits in gait, balance, and strength in order to maximize functional mobility post-TIA  Plan: Continue per plan of care  Progress treatment as tolerated  POC expires Auth Status Total     Start date  Expiration date PT/OT + Visit Limit?  Co-Insurance   22 NA NA NA NA BOMN, PT + OT No                                           Outcome Measures Initial Eval  2022 & DN 22        5xSTS 14 25 sec,   2 UE         TUG 15 01 sec        10 meter 0 8 m/s        LEACH 41/56        FGA         DGI         MiniBEST         6MWT 840 ft

## 2022-09-12 ENCOUNTER — HOSPITAL ENCOUNTER (OUTPATIENT)
Dept: BONE DENSITY | Facility: CLINIC | Age: 77
Discharge: HOME | End: 2022-09-12
Payer: MEDICARE

## 2022-09-12 DIAGNOSIS — Z13.820 SCREENING FOR OSTEOPOROSIS: ICD-10-CM

## 2022-09-12 DIAGNOSIS — Z78.0 ASYMPTOMATIC MENOPAUSAL STATE: ICD-10-CM

## 2022-09-12 PROCEDURE — 77080 DXA BONE DENSITY AXIAL: CPT

## 2022-09-13 ENCOUNTER — EVALUATION (OUTPATIENT)
Dept: OCCUPATIONAL THERAPY | Facility: CLINIC | Age: 77
End: 2022-09-13
Payer: MEDICARE

## 2022-09-13 ENCOUNTER — OFFICE VISIT (OUTPATIENT)
Dept: PHYSICAL THERAPY | Facility: CLINIC | Age: 77
End: 2022-09-13
Payer: MEDICARE

## 2022-09-13 DIAGNOSIS — I63.20 CEREBROVASCULAR ACCIDENT (CVA) DUE TO OCCLUSION OF PRECEREBRAL ARTERY (HCC): Primary | ICD-10-CM

## 2022-09-13 DIAGNOSIS — R35.1 NOCTURIA: ICD-10-CM

## 2022-09-13 PROCEDURE — 97530 THERAPEUTIC ACTIVITIES: CPT | Performed by: OCCUPATIONAL THERAPIST

## 2022-09-13 PROCEDURE — 97112 NEUROMUSCULAR REEDUCATION: CPT

## 2022-09-13 NOTE — PROGRESS NOTES
Daily Note     Today's date: 2022  Patient name: Blayne Yost  :   MRN: [id number]  Referring provider: Stephen Cuba MD  Dx:   Encounter Diagnosis     ICD-10-CM    1  Cerebrovascular accident (CVA) due to occlusion of precerebral artery (HCC)  I63 20    2  Nocturia  R35 1                   Subjective: Patient reports with no new issues or complaints  Objective: See treatment diary below    **4 lb right ankle weight donned entire session**    NMR:   - STS from standard chair + foam pad on chair and one under (L) foot x 20  - Posterior resisted (red) walking x 300 ft + random perturbations   - Step ups FWD (8") + posterior resistance (red) : 20x, 1 UE support from PT   - Step ups LAT (8") + posterior resistance (red) : 20x, 1 UE support from PT  - FWD Hurdles (reciprocal stepping): 6 hurdles x 4 cycles down/back,CS    Assessment: Patient tolerated treatment session well today with continued focus on functional strength, gait, and balance training  Patient with noted narrow KEYLA when provided with posterior resistance, reporting "I learned to walk this way after my CVA"  Patient challenged with step ups and reactive balance tasks, noted by medial to lateral sway  Patient fatigued end of session  Patient will continue to benefit from skilled OPPT services to address deficits in gait, balance, and strength in order to maximize functional mobility post-TIA  Plan: Continue per plan of care  Progress treatment as tolerated  POC expires Auth Status Total     Start date  Expiration date PT/OT + Visit Limit?  Co-Insurance   22 NA NA NA NA BOMN, PT + OT No                                           Outcome Measures Initial Eval  2022 & DN 22        5xSTS 14 25 sec,   2 UE         TUG 15 01 sec        10 meter 0 8 m/s        LEACH 41/56        FGA         DGI         MiniBEST         6MWT 840 ft

## 2022-09-13 NOTE — PROGRESS NOTES
OCCUPATIONAL THERAPY RE- EVALUATION    Today's Date: 2022  Patient Name: Karsten Kehr  :   MRN: [id number]  Referring Provider: Krystian Funk MD  Dx: Cerebrovascular accident (CVA) due to occlusion of precerebral artery ([address] ) [I63 20]    SKILLED ANALYSIS:  Pt is a R hand dominant 78year old female presenting to re-evaluation after 1 month of OP OT services s/p CVA  Pt reports improved RUE and hand function, increased independence with fine motor tasks including opening containers and increased participation with homemaking tasks  Today she demonstrates improved b/l [address], improved gross grasp strength, improved RUE strength at shoulder, elbow and wrist, and improved cognitive function  Recommend continued OT services 2x/wk for 8 weeks in order to maximize recovery s/p CVA  Pt and family in agreement  Next session finish vision screen  PLAN OF CARE START:22  PLAN OF CARE END: 22  FREQUENCY: 2-3x/wk  PRECAUTIONS Tajik speaking, falls, mod severity b/l cataracts    Subjective  Pt reports that her hand is much better since [facility] and is no longer swollen  She reports no difficulty with daily activities and can now open her bottles of water without assistance  Mechanism of Injury  Richa Chaudhari is a 78 y o  female patient who originally presented to the hospital on 2022 due to arm weakness numbness and gait imbalance  In the ED patient presented with significant elevated blood pressure 237/200 decreased motor strength 4/5 in the right loupper extremity  A stroke alert was initiated and neurology was consulted  Patient did not receive tPA   Patient was loaded with aspirin and Plavix  Patient had a CT head that was negative for any acute infarct infarction or bleeding  Patient had a CTA head and neck that was unremarkable for large vessel occlusion however did reveal a 4 mm ICA aneurysm   MRI brain did not show any acute findings, did show chronic small L basal ganglia lacunar infarct  Mild, chronic microangiopathy is similar to the previous study During hospitalization symptoms significantly improved    Occupational Profile  Recently moved to the [address] [place] [address] [address] from [facility]  Lives with her daughter, Dara La  Had vision checked just before TIA and was diagnosed with moderate cataracts  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  Enjoys taking the family dog for walks  PATIENT GOAL: "The right side, the strength in my right hand and leg  I feel like I fall to the right side "  Wants to be able to go for walks with the dog  HISTORY OF PRESENT ILLNESS:     Pt is a 78 y o  female who was referred to Occupational Therapy s/p  Cerebrovascular accident (CVA) due to occlusion of precerebral artery ([address] ) [I63 20]  PMH:   Past Medical History:   Diagnosis Date    Hypertension     Kidney stones        Past Surgical Hx: No past surgical history on file  Pain:  Location:     Restin    With Activity:  5/10 from low back down RLE when walking    Objective    Upper Extremities:  Pt is right hand dominant                STANLEY: RUE: 42lbs (on IE 25 3/200) LUE: 42 6 (on IE 34 6/200)  The age norm is approximately 42 lbs R, 37 lbs L lbs and indicating  strength WNL  Range of Motion: [facility]     Manual Muscle Testing:  R UE:  - Shoulder flexion: 4-/5  - Shoulder abduction 4-/5  - Elbow flexion: 5/5  - Elbow extension: 4-/5  - Wrist flexion: 5/5  - Wrist extension: 5/5  L UE:  - Shoulder flexion: 4/5  - Shoulder abduction 4/5  - Elbow flexion: 5/5  - Elbow extension: 4/5  - Wrist flexion: 5/5  - Wrist extension: 5/5       NINE-HOLE PEG TEST: assessing dexterity/fine motor coordination with pt scoring 28 1 seconds on R hand and 27 1 seconds on L hand side  Pt demonstrating decreased [facility] related to age-related norms (age R 25 seconds, L 24)  Scores indicate decreased b/l [facility] however may have been affected by vision deficits      Functional Dexterity Test: assesses patient's ability to use the hand for daily tasks requiring a 3-jaw jose m prehension between the fingers and the thumb     R UE: 35 7 seconds with 1 drop (prior 28 7 seconds)  L UE: 31 1 seconds (prior 29 9 seconds)  Norms for age/sex: dominant hand 37 seconds, non-dominant hand 38 seconds  Scores indicate dexterity WNL for age      Modified Iain Scale: measures spasticity in patients with lesions in the Central Nervous System  R UE: 0/4 at elbow, wrist, FA    0: No increase in muscle tone  1: Slight increase in muscle tone, manifested by a catch and release or by minimal resistance at the end of the range of motion when the affected part(s) is moved in flexion or extension  1+: Slight increase in muscle tone, manifested by a catch, followed by minimal resistance throughout the remainder (less than half) of the ROM  2: More marked increase in muscle tone through most of the ROM, but affected part(s) easily moved  3: Considerable increase in muscle tone, passive movement difficult  4: Affected part(s) rigid in flexion or extension    Subluxation: not assessed    No scapular winging noted     PROPRIOCEPTION:  not tested d/t time constraints    Finger to Nose Testing without visual occlusion: not tested d/t time constraints    Monofilaments/sensory testing: not tested d/t time constraints    Functional Cognition:  Highest level of education: some college    [place] Making Part A: 1:06 (on IE 1:44 with 1 self corrected error)  [place] Making Part B: 3:00 with no cues (on IE 3:23 with multiple errors/omissions, at least 10 verbal cues)  Norms for age/education: A 41 seconds, B 100 seconds    Contextual Memory Test:  Immediate: /20,  confabulations  Delayed: /20,  confabulations     GOALS:   Short Term Goals:  Pt will increase BUE  strength by 3 lbs to complete IADLs ACHIEVED 9/13  Pt will increase b/l FMC by 3 seconds on 9 hole peg to complete ADLs and IADLs ACHIEVED 9/13  Pt will increase RUE strength by 1 level for MMT at all pivots in order to complete ADLs/IADLs  ACHIEVED 9/13  Pt will participate in simulated homemaking tasks with distant supervision based on improved standing balance in order to complete IADLs  ACHIEVED 9/13      Long Term Goals: 8-12 weeks  Pt will increase BUE  strength by 7 lbs to complete IADLs ACHIEVED 9/13  Pt will increase b/l FMC by 8 seconds on 9 hole peg to complete ADLs and IADLs PROGRESSING  Pt will increase RUE strength to 4+ to 5/5 at all pivots in order to complete ADLs/IADLs  PROGRESSING  Pt will participate in simulated homemaking tasks independently based on improved standing balance in order to complete IADLs  PROGRESSING      OTHER PLANNED THERAPY INTERVENTIONS:   Supine, seated, and in stance neuro re-ed  Tricep AG  NMES/FES  FMC/prehension  Timed Trials  Manual tx  Hand to target  Sensory re-ed  Seated functional reach: crossing midline  Supine place and hold  WBearing strategies   Closed chain activities  Open chain activities  Internal and external memory aides  Multimatrix for saccades/ visual clutter/attention  Hypersensitivity strategies education  Multi-modal environment  Sustained/alternating/divided attention  Tracking tube  Oculomotor control:  saccades, con/divergence  Conv /div   Dynamic tasks  Work stations with timed transitions  Temporal Awareness  Memory and mental manipulation  Auditory processing with immediate recall  Memory retention with immediate and delayed recall  Edu on cog/vision apps      Outcome Measures EVAL  PN  PN PN PN PN   Fugyl carmichael         STANLEY    2 pinch  Lateral  3 pinch R 25 3  L 34 6 42  42 6       9 hole peg test R 32 7  L 33 7 28 1  27 1       Functional dexterity R 28 7  L 29 9        MOCA Score only          Trail making Part A  Part B

## 2022-09-14 ENCOUNTER — OFFICE VISIT (OUTPATIENT)
Dept: PHYSICAL THERAPY | Facility: CLINIC | Age: 77
End: 2022-09-14
Payer: MEDICARE

## 2022-09-14 DIAGNOSIS — R35.0 URINARY FREQUENCY: Primary | ICD-10-CM

## 2022-09-14 DIAGNOSIS — N39.41 URGE INCONTINENCE: ICD-10-CM

## 2022-09-14 DIAGNOSIS — R35.1 NOCTURIA: ICD-10-CM

## 2022-09-14 PROCEDURE — 97112 NEUROMUSCULAR REEDUCATION: CPT

## 2022-09-14 PROCEDURE — 97110 THERAPEUTIC EXERCISES: CPT

## 2022-09-14 NOTE — PROGRESS NOTES
Daily Note     Today's date: 2022  Patient name: Belkys Cooney  :   MRN: [id number]  Referring provider: Landy Cabello  Dx:   Encounter Diagnosis     ICD-10-CM    1  Urinary frequency  R35 0    2  Urge incontinence  N39 41    3  Nocturia  R35 1                   Subjective: Pt reports that she is feeling a little tired today  Pt reports that she continues to have issues with urinary incontinence as she gets up 3-4 times during the night  Objective: See treatment diary below      Assessment: Tolerated treatment well  Patient demonstrated fatigue post treatment, exhibited good technique with therapeutic exercises and would benefit from continued PT      Plan: Continue per plan of care  Precautions: CVA, fall risk, vision impairment, Danish speaking only  Manuals 22           PFM assessment  Verbal and written consent provided -IM             Daughter explained to mother what exam entailed via phone- IM                                       Neuro Re-Ed             PFM supine biofeedback  req multi cues  req multi cues             engaging core and LE muscles to try and do a kegel   engaging core and LE muscles to try and do a kegel  Diaphragmatic breathing  Req cues- IM  req cues- im              Liana stretch x 10 5s              Child pose x 10 5s              happy baby x 10 5s                         Ther Ex               PFM iso x 20 requires cues and progression provided                                                                                                         Ther Activity                                       Gait Training                                       Modalities

## 2022-09-15 ENCOUNTER — APPOINTMENT (OUTPATIENT)
Dept: OCCUPATIONAL THERAPY | Facility: CLINIC | Age: 77
End: 2022-09-15
Payer: MEDICARE

## 2022-09-15 ENCOUNTER — APPOINTMENT (OUTPATIENT)
Dept: PHYSICAL THERAPY | Facility: CLINIC | Age: 77
End: 2022-09-15
Payer: MEDICARE

## 2022-09-16 ENCOUNTER — HOSPITAL ENCOUNTER (OUTPATIENT)
Dept: GASTROENTEROLOGY | Facility: AMBULATORY SURGERY CENTER | Age: 77
Discharge: HOME | End: 2022-09-16
Payer: MEDICARE

## 2022-09-16 ENCOUNTER — ANESTHESIA EVENT (OUTPATIENT)
Dept: GASTROENTEROLOGY | Facility: AMBULATORY SURGERY CENTER | Age: 77
End: 2022-09-16

## 2022-09-16 ENCOUNTER — ANESTHESIA (OUTPATIENT)
Dept: GASTROENTEROLOGY | Facility: AMBULATORY SURGERY CENTER | Age: 77
End: 2022-09-16

## 2022-09-16 VITALS
RESPIRATION RATE: 18 BRPM | HEIGHT: 60 IN | HEART RATE: 62 BPM | BODY MASS INDEX: 32.89 KG/M2 | OXYGEN SATURATION: 100 % | SYSTOLIC BLOOD PRESSURE: 123 MMHG | TEMPERATURE: 96.5 F | DIASTOLIC BLOOD PRESSURE: 73 MMHG | WEIGHT: 167.55 LBS

## 2022-09-16 DIAGNOSIS — Z12.11 SCREEN FOR COLON CANCER: ICD-10-CM

## 2022-09-16 PROCEDURE — 88305 TISSUE EXAM BY PATHOLOGIST: CPT | Performed by: PATHOLOGY

## 2022-09-16 PROCEDURE — 45385 COLONOSCOPY W/LESION REMOVAL: CPT | Performed by: INTERNAL MEDICINE

## 2022-09-16 RX ORDER — SODIUM CHLORIDE 9 MG/ML
INJECTION, SOLUTION INTRAVENOUS CONTINUOUS PRN
Status: DISCONTINUED | OUTPATIENT
Start: 2022-09-16 | End: 2022-09-16

## 2022-09-16 RX ORDER — PROPOFOL 10 MG/ML
INJECTION, EMULSION INTRAVENOUS AS NEEDED
Status: DISCONTINUED | OUTPATIENT
Start: 2022-09-16 | End: 2022-09-16

## 2022-09-16 RX ADMIN — PROPOFOL 20 MG: 10 INJECTION, EMULSION INTRAVENOUS at 09:58

## 2022-09-16 RX ADMIN — PROPOFOL 80 MG: 10 INJECTION, EMULSION INTRAVENOUS at 09:44

## 2022-09-16 RX ADMIN — SODIUM CHLORIDE: 9 INJECTION, SOLUTION INTRAVENOUS at 09:35

## 2022-09-16 RX ADMIN — PROPOFOL 20 MG: 10 INJECTION, EMULSION INTRAVENOUS at 09:46

## 2022-09-16 RX ADMIN — PROPOFOL 20 MG: 10 INJECTION, EMULSION INTRAVENOUS at 09:49

## 2022-09-16 RX ADMIN — PROPOFOL 20 MG: 10 INJECTION, EMULSION INTRAVENOUS at 09:55

## 2022-09-16 NOTE — ANESTHESIA PREPROCEDURE EVALUATION
Procedure:  COLONOSCOPY    Relevant Problems   CARDIO   (+) Cerebrovascular accident (CVA) due to occlusion of precerebral artery (HCC)   (+) Hyperlipidemia   (+) Hypertensive urgency   (+) Primary hypertension      NEURO/PSYCH   (+) Cerebrovascular accident (CVA) due to occlusion of precerebral artery (HCC)   (+) Weakness of right hand        Physical Exam    Airway    Mallampati score: III  TM Distance: >3 FB  Neck ROM: full     Dental   No notable dental hx     Cardiovascular  Cardiovascular exam normal    Pulmonary  Pulmonary exam normal     Other Findings      Interpretation Summary         Left Ventricle: Left ventricular cavity size is normal  Wall thickness is upper normal  The left ventricular ejection fraction is 65%  Systolic function is normal  Wall motion is normal  Diastolic function is mildly abnormal, consistent with grade I (abnormal) relaxation    Left Atrium: The atrium is mildly dilated    Aortic Valve: There is mild to moderate regurgitation    Mitral Valve: There is trace regurgitation    Tricuspid Valve: There is mild regurgitation    Pulmonary Artery: The pulmonary artery systolic pressure is normal        Anesthesia Plan  ASA Score- 2     Anesthesia Type- IV sedation with anesthesia with ASA Monitors  Additional Monitors:   Airway Plan:           Plan Factors-Exercise tolerance (METS): >4 METS  Chart reviewed  Patient summary reviewed  Patient is not a current smoker  Induction- intravenous  Postoperative Plan-     Informed Consent- Anesthetic plan and risks discussed with patient

## 2022-09-16 NOTE — INTERVAL H&P NOTE
H&P reviewed  After examining the patient I find no changes in the patients condition since the H&P had been written      Vitals:    09/16/22 0928   BP: 125/67   Pulse: 73   Resp: 18   Temp: (!) 96 5 °F (35 8 °C)   SpO2: 96%

## 2022-09-16 NOTE — ANESTHESIA POSTPROCEDURE EVALUATION
Post-Op Assessment Note    CV Status:  Stable    Pain management: adequate     Mental Status:  Alert and awake   Hydration Status:  Euvolemic   PONV Controlled:  Controlled   Airway Patency:  Patent      Post Op Vitals Reviewed: Yes      Staff: CRNA         No complications documented      BP   124/78   Temp  98 2   Pulse  74   Resp   18   SpO2   99

## 2022-09-20 ENCOUNTER — OFFICE VISIT (OUTPATIENT)
Dept: PHYSICAL THERAPY | Facility: CLINIC | Age: 77
End: 2022-09-20
Payer: MEDICARE

## 2022-09-20 ENCOUNTER — OFFICE VISIT (OUTPATIENT)
Dept: OCCUPATIONAL THERAPY | Facility: CLINIC | Age: 77
End: 2022-09-20
Payer: MEDICARE

## 2022-09-20 DIAGNOSIS — I63.20 CEREBROVASCULAR ACCIDENT (CVA) DUE TO OCCLUSION OF PRECEREBRAL ARTERY (HCC): Primary | ICD-10-CM

## 2022-09-20 PROCEDURE — 97112 NEUROMUSCULAR REEDUCATION: CPT | Performed by: OCCUPATIONAL THERAPIST

## 2022-09-20 PROCEDURE — 97530 THERAPEUTIC ACTIVITIES: CPT | Performed by: OCCUPATIONAL THERAPIST

## 2022-09-20 PROCEDURE — 97112 NEUROMUSCULAR REEDUCATION: CPT

## 2022-09-20 NOTE — PROGRESS NOTES
Daily Note     Today's date: 2022  Patient name: Ese Mclean  :   MRN: [id number]  Referring provider: Allison Zarco MD  Dx:   Encounter Diagnosis   Name Primary?  Cerebrovascular accident (CVA) due to occlusion of precerebral artery ([address] ) Yes       Start Time: 822  Stop Time: 845  Total time in clinic (min): 23 minutes    Precautions: Portuguese speaking, falls, mod severity b/l cataracts  Visit 2  PN due 10/13  POC valid 22  No Auth- BOMN    Subjective: "Massimo"    Objective: See treatment below  NMRE:  -Quadruped push-ups 2x10 for RUE proprioceptive input to promote neuroplasticity  Pt gestured that her R knee hurt after second set, however had no c/o pain after a short seated rest break    -While standing pt completed RUE reaching/FMC activity using tapered pegs and pegboard on wall at shoulder height to R side  Completed x80 reps to address RUE motor control and [address], proximal strengthening  Therapeutic Activities:  -Qbitz x1 round focusing on  skills  Completed with extra time, cues to correcting 2 errors  Assessment: Tolerated treatment well  Pt demonstrated improvement in  skills during qbitz  Pt would benefit from continued OT services to address RUE function, cognition for life roles  Plan: Continued skilled OT per POC

## 2022-09-20 NOTE — PROGRESS NOTES
Daily Note     Today's date: 2022  Patient name: Josiane Alberts  :   MRN: [id number]  Referring provider: Charlie Garcia MD  Dx:   Encounter Diagnosis     ICD-10-CM    1  Cerebrovascular accident (CVA) due to occlusion of precerebral artery (HCC)  I63 20                   Subjective: Patient reports with no new issues or complaints  Objective: See treatment diary below    **4 lb right ankle weight donned entire session**    NMR:   - STS from standard chair + foam pad on chair and one under (L) foot x 20  - Posterior resisted (red) walking x 300 ft + random perturbations   - Step ups FWD (8") + posterior resistance (red) : 20x, no UE  - Step ups LAT (8") + lateral resistance (red) : 20x, no UE  - Sidestepping on foam beam w/ posterior resistance (red) x 5 cycles; HHA    Treadmill training - Peak HR: 140 bpm  - 0-5 mins @ 1 1 mph, 0% incline  - Cool down @ 0 9 mph, 0% incline x 1 mins    Assessment: Patient tolerated treatment session well today with continued focus on functional strength, gait, and balance training  Frequent use of stepping strategy observed with resisted ambulation, particularly with addition of perturbations  Significant difficulty maintaining postural stability when navigating compliant surfaces with posterior resistance with increased reliance on UE support  Patient will continue to benefit from skilled OPPT services to address deficits in gait, balance, and strength in order to maximize functional mobility post-TIA  Plan: Continue per plan of care  Progress treatment as tolerated  POC expires Auth Status Total     Start date  Expiration date PT/OT + Visit Limit?  Co-Insurance   22 NA NA NA NA BOMN, PT + OT No                                           Outcome Measures Initial Eval  2022 & DN 22        5xSTS 14 25 sec,   2 UE         TUG 15 01 sec        10 meter 0 8 m/s        LEACH /56        FGA         DGI         MiniBEST  6MWT 840 ft

## 2022-09-21 ENCOUNTER — OFFICE VISIT (OUTPATIENT)
Dept: NEUROLOGY | Facility: CLINIC | Age: 77
End: 2022-09-21

## 2022-09-21 VITALS
WEIGHT: 171.9 LBS | HEIGHT: 60 IN | HEART RATE: 73 BPM | DIASTOLIC BLOOD PRESSURE: 71 MMHG | BODY MASS INDEX: 33.75 KG/M2 | SYSTOLIC BLOOD PRESSURE: 131 MMHG

## 2022-09-21 DIAGNOSIS — I63.20 CEREBROVASCULAR ACCIDENT (CVA) DUE TO OCCLUSION OF PRECEREBRAL ARTERY (HCC): ICD-10-CM

## 2022-09-21 DIAGNOSIS — R29.898 WEAKNESS OF RIGHT HAND: Primary | ICD-10-CM

## 2022-09-21 DIAGNOSIS — Z86.73 HISTORY OF STROKE: ICD-10-CM

## 2022-09-21 NOTE — ASSESSMENT & PLAN NOTE
· Improving  · Continue OT to improve strength and function of the Rt hand  I have spent 30 minutes with Patient and family today in which greater than 50% of this time was spent in counseling/coordination of care regarding Diagnostic results, Prognosis, Risks and benefits of tx options, Intructions for management, Patient and family education, Importance of tx compliance, Risk factor reductions and Impressions  She will follow-up as needed

## 2022-09-21 NOTE — ASSESSMENT & PLAN NOTE
 Pt denies any symptoms to suggest new stroke   Pt should continue Aspirin and atorvastatin for secondary stroke prevention   Pt was encouraged to get regular exercise and follow a Mediterranean diet   If pt has any new stroke-like symptoms including sudden painless loss of vision or double vision, difficulty speaking or swallowing, vertigo / room spinning that does not quickly resolve, or weakness / numbness affecting 1 side of the face or body he should proceed by ambulance to the nearest emergency room immediately

## 2022-09-21 NOTE — PROGRESS NOTES
Patient ID: Iesha Gunn is a 78 y o  female  Assessment/Plan:    Weakness of right hand  · Improving  · Continue OT to improve strength and function of the Rt hand  I have spent 30 minutes with Patient and family today in which greater than 50% of this time was spent in counseling/coordination of care regarding Diagnostic results, Prognosis, Risks and benefits of tx options, Intructions for management, Patient and family education, Importance of tx compliance, Risk factor reductions and Impressions  She will follow-up as needed  History of stroke   Pt denies any symptoms to suggest new stroke   Pt should continue Aspirin and atorvastatin for secondary stroke prevention   Pt was encouraged to get regular exercise and follow a Mediterranean diet   If pt has any new stroke-like symptoms including sudden painless loss of vision or double vision, difficulty speaking or swallowing, vertigo / room spinning that does not quickly resolve, or weakness / numbness affecting 1 side of the face or body he should proceed by ambulance to the nearest emergency room immediately  Problem List Items Addressed This Visit        Cardiovascular and Mediastinum    Cerebrovascular accident (CVA) due to occlusion of precerebral artery ([address] )       Other    Weakness of right hand - Primary     · Improving  · Continue OT to improve strength and function of the Rt hand  I have spent 30 minutes with Patient and family today in which greater than 50% of this time was spent in counseling/coordination of care regarding Diagnostic results, Prognosis, Risks and benefits of tx options, Intructions for management, Patient and family education, Importance of tx compliance, Risk factor reductions and Impressions  She will follow-up as needed  History of stroke      Pt denies any symptoms to suggest new stroke   Pt should continue Aspirin and atorvastatin for secondary stroke prevention     Pt was encouraged to get regular exercise and follow a Mediterranean diet   If pt has any new stroke-like symptoms including sudden painless loss of vision or double vision, difficulty speaking or swallowing, vertigo / room spinning that does not quickly resolve, or weakness / numbness affecting 1 side of the face or body he should proceed by ambulance to the nearest emergency room immediately  Subjective:    CAMILA Florentino is a 78 y o  right handed female, with intracranial aneurysm, HTN and OAB, who presented to [facility] on 7/29/22 with R sided numbness and Rt hand weakness  NIHSS 1 (sensation)  BP was elevated in the ED  She was not a tPA candidate due to being outside of tPA time window  Imagining did not reveal a new stroke  She did have findings consistent with an old stroke and is taking ASA and atorvastatin  She has been attending outpt PT and OT since being discharged from the hospital  She reports more fatigue form therapy  She reports that the Rt hand weakness is improving  An EMG was ordered but she did not schedule it due to the improvement in her hand strength  She remains on ASA and atorvastatin as she was prior to presenting to the hospital  She denies any symptoms to suggest new stroke  She admits to not getting much exercise outside of PT and OT  She does not follow a specific diet  The following portions of the patient's history were reviewed and updated as appropriate: allergies, current medications, past family history, past medical history, past social history, past surgical history and problem list          Objective:    Blood pressure 131/71, pulse 73, height 5' (1 524 m), weight 78 kg (171 lb 14 4 oz)  Physical Exam  Vitals reviewed  Eyes:      General: Lids are normal       Extraocular Movements: Extraocular movements intact  Pupils: Pupils are equal, round, and reactive to light  Neurological:      Coordination: Coordination is intact        Deep Tendon Reflexes: Reflexes are normal and symmetric  Psychiatric:         Speech: Speech normal          Neurological Exam  Mental Status   Oriented to person, place, time and situation  Recent and remote memory are intact  Speech is normal  Language is fluent with no aphasia  Attention and concentration are normal  Fund of knowledge is appropriate for level of education  Apraxia absent  Cranial Nerves  CN II: Visual acuity is normal  Visual fields full to confrontation  CN III, IV, VI: Extraocular movements intact bilaterally  Normal lids and orbits bilaterally  Pupils equal round and reactive to light bilaterally  CN V: Facial sensation is normal   CN VII: Full and symmetric facial movement  CN VIII: Hearing is normal   CN IX, X: Palate elevates symmetrically  Normal gag reflex  CN XI: Shoulder shrug strength is normal   CN XII: Tongue midline without atrophy or fasciculations  Motor   Strength is 5/5 in all four extremities except as noted  Decreased  Rt  Sensory  Sensation is intact to light touch, pinprick, vibration and proprioception in all four extremities  Reflexes  Deep tendon reflexes are 2+ and symmetric in all four extremities  Coordination    Finger-to-nose, rapid alternating movements and heel-to-shin normal bilaterally without dysmetria  Gait  Casual gait is normal including stance, stride, and arm swing  ROS:    Review of Systems   Constitutional: Negative  Negative for appetite change and fever  HENT: Negative  Negative for hearing loss, tinnitus, trouble swallowing and voice change  Eyes: Negative  Negative for photophobia and pain  Respiratory: Negative  Negative for shortness of breath  Cardiovascular: Negative  Negative for palpitations  Gastrointestinal: Negative  Negative for nausea and vomiting  Endocrine: Negative  Negative for cold intolerance  Genitourinary: Negative  Negative for dysuria, frequency and urgency     Musculoskeletal: Negative  Negative for myalgias and neck pain  Skin: Negative  Negative for rash  Neurological: Negative  Negative for dizziness, tremors, seizures, syncope, facial asymmetry, speech difficulty, weakness, light-headedness, numbness and headaches  Hematological: Negative  Does not bruise/bleed easily  Psychiatric/Behavioral: Negative  Negative for confusion, hallucinations and sleep disturbance  All other systems reviewed and are negative  Review of systems personally reviewed

## 2022-09-22 ENCOUNTER — OFFICE VISIT (OUTPATIENT)
Dept: OCCUPATIONAL THERAPY | Facility: CLINIC | Age: 77
End: 2022-09-22
Payer: MEDICARE

## 2022-09-22 ENCOUNTER — EVALUATION (OUTPATIENT)
Dept: PHYSICAL THERAPY | Facility: CLINIC | Age: 77
End: 2022-09-22
Payer: MEDICARE

## 2022-09-22 DIAGNOSIS — I63.20 CEREBROVASCULAR ACCIDENT (CVA) DUE TO OCCLUSION OF PRECEREBRAL ARTERY (HCC): Primary | ICD-10-CM

## 2022-09-22 PROCEDURE — 97530 THERAPEUTIC ACTIVITIES: CPT

## 2022-09-22 PROCEDURE — 97112 NEUROMUSCULAR REEDUCATION: CPT

## 2022-09-22 NOTE — PROGRESS NOTES
Daily Note     Today's date: 2022  Patient name: Ludy Sánchez  : [id number]  MRN: [id number]  Referring provider: Cecile Lorenzo MD  Dx:   Encounter Diagnosis   Name Primary?  Cerebrovascular accident (CVA) due to occlusion of precerebral artery ([facility] [address] ) Yes                  Precautions: Welsh speaking, falls, mod severity b/l cataracts  Visit 3  PN due 10/13  POC valid 22  No Auth- BOMN    Subjective: "Massimo"    Objective: See treatment below  NMRE:  -Quadruped push-ups 2x10 and shoulder flexion 2 x 10 reps  for RUE proprioceptive input to promote neuroplasticity  Pt reports RUE pain however educated on post stroke pain after stoke for radiating throughout RUE   - performed 1 lb wrist weight for proprioceptive feedback in RUE- performed mulitmatrix of simple shapes of matching required 2-3 VCs for problem solving secondary to decrease  discrimination  Therapeutic Activities:  -educated on fall recovery- educated son on how to provide minimal A - performed from floor to chair transfer with minimal A      Assessment: Tolerated treatment well  Pt demonstrated improvement in  skills during qbitz  Pt would benefit from continued OT services to address RUE function, cognition for life roles  Plan: Continued skilled OT per POC

## 2022-09-22 NOTE — PROGRESS NOTES
PT Re-Evaluation          POC expires Auth Status Total     Start date  Expiration date PT/OT + Visit Limit? Co-Insurance   22 NA NA NA NA BOMN, PT + OT No                                                Today's date: 2022  Patient name: David Auguste  : 1945  MRN: [id number]  Referring provider: Stephen Estrada MD  Dx:   Encounter Diagnosis     ICD-10-CM    1  Cerebrovascular accident (CVA) due to occlusion of precerebral artery [facility])  I63 20          Assessment  Assessment details:  Patient is a 78year old male who has been presenting to skilled OPPT services following TIA and subsequent (R) sided weakness that in turn has limited safe performance in ADLs/IADLs and mobility at home and in the community  Patient presented to ED on 22 with complaints of dizziness and (R) sided weakness  CTA scan and MRI findings negative, however referral for cerebrovascular accident (CVA) due to occlusion of precerebral artery per Dr Jesusita Ybarra MD  Patient demonstrated overall improvements in the following outcome measures since IE on 2022: 5 x STS, TUG, 10 MWT, Alves, DGI, MiniBEST, and 6 MWT, likely indicating overall gains in functional LE strength, safe mobility, ambulation speed, static balance, dynamic balance, postural stability/reactive balance, and cardiovascular endurance, respectively  Despite these improvements, per cutoff scores for the FGA, DGI, and miniBEST she is classified as HIGH risk for falls  All normative values and cutoff scores taken from the APTA Neuro Section and Rehab Measures  Plan for general strength, balance, and gait training  Plan to continue to focus on higher level dynamic balance and endurance tasks for next 1-2 months  She has currently met 2 short term goals at this time   Patient will continue benefit from skilled OPPT services to address these aforementioned deficits in order to maximize functional mobility post-TIA  Impairments: Abnormal gait, Activity intolerance and Impaired balance  Understanding of Dx/Px/POC: Good  Prognosis: Excellent      Patient verbalized understanding of POC  Please contact me if you have any questions or recommendations  Thank you for the referral and the opportunity to share in [address] care          Plan  Plan details: balance, gait, strength training  Patient would benefit from: PT Eval and Skilled PT  Planned modality interventions: Biofeedback, Cryotherapy, TENS and Thermotherapy: Hydrocollator Packs  Planned therapy interventions: Balance, Gait training, Neuromuscular re-education, Strengthening, Stretching, Therapeutic activities and Therapeutic exercises  Frequency: 2x/wk  Duration in weeks: 12 weeks  Plan of Care beginning date: 8/19/2022  Plan of Care expiration date: 12 weeks - 11/11/2022  Treatment plan discussed with: Patient        Goals  Short Term Goals (4 weeks):    - Patient will improve time on TUG by 2 9 seconds from 15 01 seconds to 12 11 seconds to facilitate improved safety in all ambulation - PROGRESSING  - Patient will improve scoring on DGI by 2 6 points from 16/24 to 19/24 to progress safety - PROGRESSING  - Patient will be independent in basic HEP 2-3 weeks - MET  - Patient will improve 5xSTS score by 2 3 seconds from 14 25 seconds to 11 95 seconds to promote improved LE functional strength needed for ADLs - MET    Long Term Goals (12 weeks):  - Patient will be independent in a comprehensive home exercise program  - Patient will improve gait speed by 0 18 s/sec to improve safety with community ambulation  - Patient will improve LEACH by 6 points from 41/56 to 47/56 to facilitate return to safe independent ambulation  - Patient will improve scoring on FGA by 4 points from 19/30 to 23/30 to progress safety with dynamic tasks  - Patient will be able to demonstrate HT in gait without veering  - Patient will improve 6 Minute Walk Test score by 190 feet to promote improved cardiovascular endurance  - Patient will report 50% reduction in near falls in order to improve safety with functional tasks and reduce his risk for falls  - Patient will report going on walks at least 3 days per week to promote independence and improved cardiovascular endurance  - Patient will be able to ascend/descend stairs reciprocally without UE assist to promote independence and safety with ADLs  - Patient will report 50% reduction in near falls when ambulating on uneven terrain      Cut off score    All date taken from APTA Neuro Section or Rehab Measures      Alves:  Agustin et al , 2018  Mariela [address]: 2 7 pts    Genia et al , 2011  Cut-off score: 45/56    Chronic CVA  < 44/56 high risk for falls (Agustin et al , 2018)  < 47 5/56 slow walker status (Tigist mir al , 2011) 5xSTS: Lizeth mir al 2010  MDC: 2 3 sec  Age Norms:  60-69: 11 1 sec  70-79: 12 6 sec  80-89: 14 8 sec    Lizeth 2010, Chronic Stroke  Chronic CVA: 12 sec   TUG  Reymundo , 2005  MDC: 2 9 sec    Cut off score:  >13 5 sec community dwelling adults  >32 2 frail elderly  <20 I for basic transfers  >30 dependent on transfers 10 Meter Walk Test:   Venkatesh pablo , 2006  Small meaningful change: 0 06 m/s  Substantial meaningful change: 0 14 m/s  MCID: 0 16 m/s    < 0 4 m/s household ambulators  0 4 - 0 8 m/s limited community ambulators  > 0 8 m/s community ambulators   FGA: Renee et al , 2010  MCID: 4 2 pts  Geriatrics/community < 22/30 fall risk  Geriatrics/community < 20/30 unexplained falls DGI  MDC: vestibular - 4 pts  MDC: geriatric/community - 3 pts  Falls risk <19/24   6 Minute Walk Test  Amna et al , 2006  MDC: 60 98 m (200 01 ft)    Brad De La Fuente, & Cut off, 2012  MCID: 34 4 m    Age Norms  60-69: M - 1876 ft   F - 1765 ft  70-79: M - 1729 ft   F - 1545 ft  80-89: M - 1368 ft   F - 1286 ft Modified Iain  0: No increase in tone  1: Catch and release or min resistance at end range  1+: Catch f/b min resistance throughout remainder (< half ROM)  2: Easily moved, but more marked tone throughout most ROM  3: Significant tone, PROM difficult  4: Rigid   MiniBest: Sarah et al , 2013  CVA < 17 5 fall risk Pass (Acute CVA)  MDC: 1 8 points (acute), 3 2 points (chronic)         Subjective    History of Present Illness  Mechanism of injury: Patient is a 78year old male who is presenting to skilled OPPT services following TIA and sequale of (R) sided weakness  Patient presented to ED on 22 with complaints of dizziness and (R) sided weakness  CTA scan and MRI findings negative  Patient is primarily [address] speaking and daughter Laverne Navarro presents to serve as   Patient's daughter reports patient enjoys walking the family dog but is unable to walk longer than one block secondary to onset of RLE pain  She obtained a shower chair for her mother based on OT's recommendation  She notes no falls, but states her mother has been more off balance  Update (2022): Patient reports overall satisfaction with PT services thus far  States she notices her right leg is less tired when walking longer distances        Primary AD: none  Assist level at home: modified independent, supervision with showering  WC usage: none  PLOF: independent    Pain  Current pain ratin/10  At best pain ratin/10  At worst pain ratin/10  Location: cramping in calf  Aggravating factors: walking    Social Support  Steps to enter house: 3 [address], but goes through garage  Stairs in house: one flight, L hand side   Lives in: 2 story home  Lives with: daughter    Employment status: retired  Hand dominance: R    Treatments  Previous treatment: none  Current treatment: OT  Diagnostic Testing: CT scan, MRI      Objective     Vitals  - HR: 76 bpm  - BP: 122/76  - SPO2: 98%    HR Max  - 207 - (0 7x75)  = 154 bpm    LE MMT  - R Hip Flexion: 3/5   - L Hip Flexion: 4/5  - R Hip Extension: 3/5   - L Hip Extension: 4/5  - R Hip Abduction: 3+/5  - L Hip abduction: 4+/5  - R Hip adduction: 3+/5  - L Hip adduction: 4+/5  - R Knee Extension: 3+/5  - L Knee Extension: 4+/5  - R Knee Flexion: 4-/5   - L Knee Flexion: 4+/5  - R Ankle DF: 3/5   - L Ankle DF: 4+/5    Sensation  - Light touch: diminished light touch on right  - Deep pressure: intact    Coordination  - Dysmetria: slowed  - Dysdiadochokinesia: slowed  - Alternating Toe Taps: difficulty, slowed on R    Modified Iain  - R knee extension: 1 - catch and release or minimum resistance at end range  - L knee extension: 0 - no increase in tone  - R knee flexion: 0 - no increase in tone       - L knee flexion: 0 - no increase in tone  - R ankle DF: 0 - no increase in tone        - L ankle DF: 0 - no increase in tone      Clonus  - L: No  - R: No  - Fatiguing: No  - Number of beats: NA    Vision  - Glasses: Yes  - Abnormalities prior to CVA: Yes, cataracts both eyes  - Abnormalities post CVA: No    Neglect  - R sided: No  - L sided: No    Pusher's Syndrome  - R sided: No  - L sided: No        Outcome Measures Initial Eval  8/19/2022 & DN 8/23/22 PN  9/22/2022       5xSTS 14 25 sec,   2 UE  10 45 sec, no UE       TUG 15 01 sec 12 34 sec       10 meter 0 8 m/s 0 91 m/s       LEACH 41/56 52/56       FGA 19/30 19/30       DGI 16/24 17/24       MiniBEST 13/28 15/28       6MWT 840 ft 940 ft                     Precautions: CVA     **Wolof Speaking**    Past Medical History:   Diagnosis Date    Hyperlipidemia     Hypertension     Kidney stones     hx of

## 2022-09-26 NOTE — RESULT ENCOUNTER NOTE
Please call the patient regarding her result  Benign, Adenomatous polyp    Repeat colonoscopy in 5 years

## 2022-09-27 ENCOUNTER — OFFICE VISIT (OUTPATIENT)
Dept: PHYSICAL THERAPY | Facility: CLINIC | Age: 77
End: 2022-09-27
Payer: MEDICARE

## 2022-09-27 ENCOUNTER — OFFICE VISIT (OUTPATIENT)
Dept: OCCUPATIONAL THERAPY | Facility: CLINIC | Age: 77
End: 2022-09-27
Payer: MEDICARE

## 2022-09-27 DIAGNOSIS — I63.20 CEREBROVASCULAR ACCIDENT (CVA) DUE TO OCCLUSION OF PRECEREBRAL ARTERY (HCC): Primary | ICD-10-CM

## 2022-09-27 PROCEDURE — 97112 NEUROMUSCULAR REEDUCATION: CPT | Performed by: PHYSICAL THERAPIST

## 2022-09-27 PROCEDURE — 97112 NEUROMUSCULAR REEDUCATION: CPT

## 2022-09-27 NOTE — PROGRESS NOTES
Daily Note     Today's date: 2022  Patient name: Iesha Gunn  :   MRN: [id number]  Referring provider: Shefali Kendall MD  Dx:   Encounter Diagnosis     ICD-10-CM    1  Cerebrovascular accident (CVA) due to occlusion of precerebral artery (HCC)  I63 20                   Subjective: Patient reports with no new issues or complaints  Objective: See treatment diary below    **4 lb right ankle weight donned entire session**    NMR:   - STS from standard chair + foam pad on chair and one under (L) foot x 20  - STS w/ foam and post band resistance: 20x  - STS no pad: 15x  - Posterior resisted (yellow) walking x 300 ft + random perturbations (2x)  - FWD lunge on BOSU: 20x  - LAT lunge on BOSU      Treadmill training - Peak HR: 140 bpm  - 0-6 mins @ 1 mph, 2% incline      Assessment: Patient tolerated treatment well  Progressed patient to 6 minutes on treadmill and 2 rounds of 300 ft walking with resistance  During walking, patient displays lateral LOB that she is able to self-correct with stepping reactions  She was most challenged with BOSU lunges and maintaining postural stability with increasing difficulty of unstable surface  Patient will continue to benefit from skilled OPPT services to address deficits in gait, balance, and strength in order to maximize functional mobility post-TIA  Plan: Continue per plan of care  Progress treatment as tolerated  POC expires Auth Status Total     Start date  Expiration date PT/OT + Visit Limit?  Co-Insurance   22 NA NA NA NA BOMN, PT + OT No                                           Outcome Measures Initial Eval  2022 & DN 22        5xSTS 14 25 sec,   2 UE         TUG 15 01 sec        10 meter 0 8 m/s        LEACH         FGA         DGI         MiniBEST         6MWT 840 ft

## 2022-09-27 NOTE — PROGRESS NOTES
Daily Note     Today's date: 2022  Patient name: Guillermo Copeland  : [birth year]  MRN: [id number]  Referring provider: Clary Sever, MD  Dx:   Encounter Diagnosis   Name Primary?  Cerebrovascular accident (CVA) due to occlusion of precerebral artery ([address] ) Yes                  Precautions: Czech speaking, falls, mod severity b/l cataracts  Visit 3  PN due 10/13  POC valid 22  No Auth- BOMN    Subjective: "Massimo"    Objective: See treatment below  NMRE:  -  - performed 2 lb wrist weight for proprioceptive feedback in RUE- pixy cube task standing on foam- focusing on [address]  - performed 2 lb wrist weight for proprioceptive feedback in RUE- light brite  Task with large amplitude movements for UE strength/coordination standing on foam- focusing on [address]  - performed 2 lb wrist weight for proprioceptive feedback in RUE- Q bitz task standing on foam- focusing on [address] required brekaing up into quadrants   Performed D2 PNF patterns using red theraband x 30 reps   Therapeutic Exercise:   performed x shoulder extensions and rows x 20 reps using red theraband     Assessment: Tolerated treatment well  Pt demonstrated improvement in  skills during qbitz  Pt would benefit from continued OT services to address RUE function, cognition for life roles  Plan: Continued skilled OT per POC

## 2022-09-28 DIAGNOSIS — I10 PRIMARY HYPERTENSION: ICD-10-CM

## 2022-09-29 ENCOUNTER — APPOINTMENT (OUTPATIENT)
Dept: OCCUPATIONAL THERAPY | Facility: CLINIC | Age: 77
End: 2022-09-29
Payer: MEDICARE

## 2022-09-29 ENCOUNTER — OFFICE VISIT (OUTPATIENT)
Dept: PHYSICAL THERAPY | Facility: CLINIC | Age: 77
End: 2022-09-29
Payer: MEDICARE

## 2022-09-29 DIAGNOSIS — I63.20 CEREBROVASCULAR ACCIDENT (CVA) DUE TO OCCLUSION OF PRECEREBRAL ARTERY (HCC): Primary | ICD-10-CM

## 2022-09-29 PROCEDURE — 97112 NEUROMUSCULAR REEDUCATION: CPT

## 2022-09-29 RX ORDER — LISINOPRIL 5 MG/1
TABLET ORAL
Qty: 30 TABLET | Refills: 1 | Status: SHIPPED | OUTPATIENT
Start: 2022-09-29

## 2022-09-29 NOTE — PROGRESS NOTES
Daily Note     Today's date: 2022  Patient name: Char Eli  : [birth year]  MRN: [id number]  Referring provider: Diogo Barlow MD  Dx:   Encounter Diagnosis     ICD-10-CM    1  Cerebrovascular accident (CVA) due to occlusion of precerebral artery (HCC)  I63 20                   Subjective: Patient reports with no new issues or complaints  Objective: See treatment diary below      NMR:   - STS from standard chair + foam pad on chair and one under (L) foot with 5# med ball hold x 20  - Posterior resisted (red) walking x 300 ft with ramp negotiation + random perturbations   - Step ups FWD (8") + posterior resistance (red) : 20x, no UE  - Ambulation w/ 8# R ankle weight x 250 ft + ramp negotiation (2x)  - Alternating step taps (8") w/ 8# R ankle weight x 20 B/L; no UE    Treadmill training - Peak HR: 121 bpm  - 0-6 mins @ 1 0-1 3 mph, 0% incline      Assessment: Patient tolerated treatment session well today with continued focus on functional strength, gait, and balance training  Patient demonstrated occasional toe catching (R > L) when performing step taps with 8 lb ankle weight  Continue with increased distal weighting of RLE to improve neuromuscular control and increased proprioceptive awareness  Plan to incorporate more dynamic gait drills in future sessions  Patient will continue to benefit from skilled OPPT services to address deficits in gait, balance, and strength in order to maximize functional mobility post-TIA  Plan: Continue per plan of care  Progress treatment as tolerated  POC expires Auth Status Total     Start date  Expiration date PT/OT + Visit Limit?  Co-Insurance   22 NA NA NA NA BOMN, PT + OT No                                           Outcome Measures Initial Eval  2022 & DN 22 PN  2022       5xSTS 14 25 sec,   2 UE  10 45 sec, no UE       TUG 15 01 sec 12 34 sec       10 meter 0 8 m/s 0 91 m/s       LEACH 41/56 52/56       FGA  DGI 16/24 17/24       MiniBEST 13/28 15/28       6MWT 840 ft 940 ft

## 2022-09-29 NOTE — TELEPHONE ENCOUNTER
Kari Bui has requested a refill of lisinopril (ZESTRIL) 5 mg tablet  Pt meets the requirements placed by [facility]  Will refill medication

## 2022-10-03 ENCOUNTER — OFFICE VISIT (OUTPATIENT)
Dept: PHYSICAL THERAPY | Facility: CLINIC | Age: 77
End: 2022-10-03
Payer: MEDICARE

## 2022-10-03 DIAGNOSIS — R35.1 NOCTURIA: ICD-10-CM

## 2022-10-03 DIAGNOSIS — R35.0 URINARY FREQUENCY: Primary | ICD-10-CM

## 2022-10-03 DIAGNOSIS — N39.41 URGE INCONTINENCE: ICD-10-CM

## 2022-10-03 PROCEDURE — 97110 THERAPEUTIC EXERCISES: CPT

## 2022-10-03 PROCEDURE — 97140 MANUAL THERAPY 1/> REGIONS: CPT

## 2022-10-03 PROCEDURE — 97112 NEUROMUSCULAR REEDUCATION: CPT

## 2022-10-03 NOTE — PROGRESS NOTES
Daily Note     Today's date: 10/3/2022  Patient name: Zeyad Jang  :   MRN: [id number]  Referring provider: Nelsy Abdul*  Dx:   Encounter Diagnosis     ICD-10-CM    1  Urinary frequency  R35 0    2  Urge incontinence  N39 41    3  Nocturia  R35 1                   Subjective: Pt reports that she she has noted decrease trips to the bathroom during the night  She states that she is able to sleep a little more soundly throughout the night  She does continue to get up in the middle of night however, reports that it isn't as frequent  Today she came in complaining of pain along her mid back  States this pain began about 4 days ago and increases when she is taking a deep breath  Objective: See treatment diary below  Thoracic spine:   Flexion: WNL no pain   Extension: 15 deg mild discomfort   Right lateral flexion: 25 deg mild discomfort   Left lateral flexion: 20 deg mild discomfort and reports sensation of stretch    TTP: along intercostal muscles between rib 5-rib 6    Thoracic spine: hypomobile along T4-T5, T5-T6, and T6-T7  Pain along T5,T6, and T7  Assessment: Tolerated treatment fair  Patient denied any type of mechanical injury that initiated rib pain  Did respond positively to heat and STM of intercostal muslces with improvements in symptoms  Pt was adviced to follow up with MD and she stated she would  Pt requires multiple cues in order to engage PFM correclty  Discussed dietary issues that could be irritating the bladder and educated patient on PFM function which she was receptive to  Pt seemed to be more concerned with intercostal pain and stated that she would follow up with MD about it  Pt will benefit from additional physical therapy in order to address PFM deficits and improve qulaity of life  Plan: Continue per plan of care  POC expires Auth Status Total     Start date  Expiration date PT/OT + Visit Limit?  Co-Insurance    NA NA NA NA BOMN, PT + OT No Manuals 09/06/22 09/14/22 10/03          PFM assessment  Verbal and written consent provided -IM  STM of intercostal muscles - im            Daughter explained to mother what exam entailed via phone- IM   assessment of rib pain with deep breathing                                     Neuro Re-Ed             PFM supine biofeedback  req multi cues  req multi cues  education provided on PFM function and proper engagement-IM           engaging core and LE muscles to try and do a kegel   engaging core and LE muscles to try and do a kegel   engaging core and LE w/ PFm req cues           Diaphragmatic breathing  Req cues- IM  req cues- im  education on bladder irritants             Liana stretch x 10 5s  open books x 10 5'             Child pose x 10 5s  piriformis stretch- x 10'             happy baby x 10 5s                         Ther Ex               PFM iso x 20 requires cues and progression provided    PFM iso x 10 req cues sitting and supine              bridge x 10              clamshells x 10                                                                            Ther Activity                                       Gait Training                                       Modalities

## 2022-10-05 ENCOUNTER — APPOINTMENT (OUTPATIENT)
Dept: PHYSICAL THERAPY | Facility: CLINIC | Age: 77
End: 2022-10-05

## 2022-10-05 ENCOUNTER — APPOINTMENT (OUTPATIENT)
Dept: OCCUPATIONAL THERAPY | Facility: CLINIC | Age: 77
End: 2022-10-05

## 2022-10-10 ENCOUNTER — OFFICE VISIT (OUTPATIENT)
Dept: PHYSICAL THERAPY | Facility: CLINIC | Age: 77
End: 2022-10-10
Payer: MEDICARE

## 2022-10-10 DIAGNOSIS — R35.1 NOCTURIA: Primary | ICD-10-CM

## 2022-10-10 DIAGNOSIS — R35.0 URINARY FREQUENCY: ICD-10-CM

## 2022-10-10 DIAGNOSIS — N39.41 URGE INCONTINENCE: ICD-10-CM

## 2022-10-10 PROCEDURE — 97530 THERAPEUTIC ACTIVITIES: CPT

## 2022-10-10 PROCEDURE — 97110 THERAPEUTIC EXERCISES: CPT

## 2022-10-10 PROCEDURE — 97112 NEUROMUSCULAR REEDUCATION: CPT

## 2022-10-10 NOTE — PROGRESS NOTES
Daily Note     Today's date: 10/10/2022  Patient name: Titus Elaine  : [birth year]  MRN: [id number]  Referring provider: Lorena Vasquez*  Dx:   Encounter Diagnosis     ICD-10-CM    1  Nocturia  R35 1    2  Urinary frequency  R35 0    3  Urge incontinence  N39 41                   Subjective: Pt reports that she has noted improvements in symptoms since starting therapy  States that she is able to sleep well at night without having to get up multiple times throughout the night  States that she has noted no leakage with any activities this pass week and she is pleased with the progress  Objective: See treatment diary below      Assessment: Tolerated treatment fair  Patient demonstrates improvements in muscle strength, endurance, and awareness  Pt tolerated treatment well with no adverse reactions  She does seem to have some issues with transportation and is hopeing to make next session her last  Pt is approaching discharge and will be re-evaluated by primary PT  next session  Plan: Progress note during next visit  POC expires Auth Status Total     Start date  Expiration date PT/OT + Visit Limit?  Co-Insurance    NA NA NA NA BOMN, PT + OT No                                               Manuals 09/06/22 09/14/22 10/03 10/10         PFM assessment  Verbal and written consent provided -IM  STM of intercostal muscles - im  stM PVM and intercostal muscles           Daughter explained to mother what exam entailed via phone- IM   assessment of rib pain with deep breathing                                     Neuro Re-Ed             PFM supine biofeedback  req multi cues  req multi cues  education provided on PFM function and proper engagement-IM reviewed and educated w/ patient           engaging core and LE muscles to try and do a kegel   engaging core and LE muscles to try and do a kegel   engaging core and LE w/ PFm req cues  PFM iso supine and sitting and stnding x 10 cues req Diaphragmatic breathing  Req cues- IM  req cues- im  education on bladder irritants  3 min            Liana stretch x 10 5s  open books x 10 5'  10s x 5 B            Child pose x 10 5s  piriformis stretch- x 10'  10s x 5 B            happy baby x 10 5s   KTC 10s x 5 B/L             TA standard x 10   TA w/ bridge 2 x 5     TA w/ heel slides x 10          Ther Ex    TA fallsouts x 10            PFM iso x 20 requires cues and progression provided  PFM iso x 10 req cues sitting and supine              bridge x 10              clamshells x 10                                                                            Ther Activity                 educated on bladder irritants, PFM muscle function             Educated on HEP and progression            Gait Training                                       Modalities

## 2022-10-11 ENCOUNTER — APPOINTMENT (OUTPATIENT)
Dept: OCCUPATIONAL THERAPY | Facility: CLINIC | Age: 77
End: 2022-10-11

## 2022-10-13 ENCOUNTER — OFFICE VISIT (OUTPATIENT)
Dept: OCCUPATIONAL THERAPY | Facility: CLINIC | Age: 77
End: 2022-10-13
Payer: MEDICARE

## 2022-10-13 ENCOUNTER — OFFICE VISIT (OUTPATIENT)
Dept: PHYSICAL THERAPY | Facility: CLINIC | Age: 77
End: 2022-10-13
Payer: MEDICARE

## 2022-10-13 DIAGNOSIS — I63.20 CEREBROVASCULAR ACCIDENT (CVA) DUE TO OCCLUSION OF PRECEREBRAL ARTERY (HCC): Primary | ICD-10-CM

## 2022-10-13 PROCEDURE — 97112 NEUROMUSCULAR REEDUCATION: CPT

## 2022-10-13 PROCEDURE — 97530 THERAPEUTIC ACTIVITIES: CPT

## 2022-10-13 PROCEDURE — 97112 NEUROMUSCULAR REEDUCATION: CPT | Performed by: PHYSICAL THERAPIST

## 2022-10-13 NOTE — PROGRESS NOTES
Daily Note     Today's date: 10/13/2022  Patient name: Burman Dakins  : 1945  MRN: [id number]  Referring provider: Irvin Cardona MD  Dx:   Encounter Diagnosis     ICD-10-CM    1  Cerebrovascular accident (CVA) due to occlusion of precerebral artery (HCC)  I63 20                   Subjective: Patient reports with no new issues or complaints  Arrives w/ son  Objective: See treatment diary below      NMR:   - STS from standard chair + foam pad on chair and one under (L) foot with 5# med ball hold x 20  - Posterior resisted (red) walking x 300 ft with ramp negotiation + random perturbations   - Step ups FWD (8") + posterior resistance (red) : 20x, no UE , 2 sets   - Ambulation w/ 5# R ankle weight x 250 ft + ramp negotiation (2x)  - Alternating step taps (8") w/ 5# R ankle weight x 20 B/L; no  , 2 sets (2nd set from foam)    Treadmill training - Peak HR: 121 bpm  - 0-6 mins @ 1 0-1 3 mph, 0% incline      Assessment: Patient tolerated treatment session well today with continued focus on functional strength, gait, and balance training  Occasional LOB with resisted step ups and step taps, requiring CG  Plan to incorporate more dynamic gait drills in future sessions  Patient will continue to benefit from skilled OPPT services to address deficits in gait, balance, and strength in order to maximize functional mobility post-TIA  Plan: Continue per plan of care  Progress treatment as tolerated  POC expires Auth Status Total     Start date  Expiration date PT/OT + Visit Limit?  Co-Insurance   22 NA NA NA NA BOMN, PT + OT No                                           Outcome Measures Initial Eval  2022 & DN 22 PN  2022       5xSTS 14 25 sec,   2 UE  10 45 sec, no UE       TUG 15 01 sec 12 34 sec       10 meter 0 8 m/s 0 91 m/s       LEACH 56 52/56       FGA        DGI        MiniBEST 13/28 15/28       6MWT 840 ft 940 ft

## 2022-10-13 NOTE — PROGRESS NOTES
Daily Note     Today's date: 10/13/2022  Patient name: Josiane Alberts  :   MRN: [id number]  Referring provider: Charlie Garcia MD  Dx:   Encounter Diagnosis   Name Primary? • Cerebrovascular accident (CVA) due to occlusion of precerebral artery ([address] ) Yes                  Precautions: Swedish speaking, falls, mod severity b/l cataracts  Visit 4  PN due 10/13  POC valid 22  No Auth- BOMN    Subjective: "Massimo"-pt was 7 minutes late for OT session  Objective: See treatment below  NMRE:  -  - performed 1 lb wrist weight for proprioceptive feedback in RUE- in quadruped using D2 patterns perfection task - performed 2 lb wrist weight for proprioceptive feedback in RUE- light brite  Task with large amplitude movements for UE strength/coordination standing on foam- focusing on [address]  - performed 1 lb wrist weight for proprioceptive feedback in RUE- honey bee task - focusing on [address]   Performed with in hand manipulation of wooden mosaic pegs using 1 lb wrist weight       Therapeutic Activity  Performed memory mix up task where pt studied 8 objects and completed in  independently required min VCs, completed 2nd trial /8      Assessment: Tolerated treatment well  Pt demonstrated improvement in  skills during qbitz  Pt would benefit from continued OT services to address RUE function, cognition for life roles  Plan: Continued skilled OT per POC

## 2022-10-17 ENCOUNTER — OFFICE VISIT (OUTPATIENT)
Dept: PHYSICAL THERAPY | Facility: CLINIC | Age: 77
End: 2022-10-17
Payer: MEDICARE

## 2022-10-17 DIAGNOSIS — R35.0 URINARY FREQUENCY: ICD-10-CM

## 2022-10-17 DIAGNOSIS — R35.1 NOCTURIA: Primary | ICD-10-CM

## 2022-10-17 DIAGNOSIS — N39.41 URGE INCONTINENCE: ICD-10-CM

## 2022-10-17 PROCEDURE — 97110 THERAPEUTIC EXERCISES: CPT

## 2022-10-17 PROCEDURE — 97112 NEUROMUSCULAR REEDUCATION: CPT

## 2022-10-17 NOTE — PROGRESS NOTES
Discharge/Daily Note     Today's date: 10/17/2022  Patient name: Hamlet Pardo  :   MRN: [id number]  Referring provider: Fatou Ya*  Dx:   Encounter Diagnosis     ICD-10-CM    1  Nocturia  R35 1    2  Urinary frequency  R35 0    3  Urge incontinence  N39 41                   Subjective: Pt reports that she feels pleased with progress and feels ready to do independent management of symptoms  States that she has noted improvements and feels about 85-90% better since starting therapy  Objective: See treatment diary below  Objective   Pelvic Floor Exam   Position: supine exam    Diastatis   Connective tissue integrity at linea alba: boggy   no tenderness at linea alba  able to engage transverse abdominis     Skin inspection:   no scars present  General Perineum Exam:   Positive for gaping introitus  Visual Inspection of Perineum:   Excursion of perineal body in cephalad direction with contraction of pelvic floor muscles (PFM): fair and good   Excursion of perineal body in caudal direction with relaxation of pelvic floor muscles (PFM): fair and good   Cotton swab test: non-tender  Cough reflex: cough reflex  Sphincter Tone Resting: normal   Sphincter Tone Squeeze: normal   Sensation: intact    Pelvic Organ Prolapse   Position: hook-lying  At rest: mild and mild  With bearing down: mild (>1cm from hymenal remnants) and moderate (to the level of hymenal remnants)    Pelvic Floor Muscle Exam     Palpation   No increased muscle tension in the bulbospongiosus, ischiocavernosus and super transverse perineal  Minimal increased muscle tension in the puborectalis and pubococcygeus  Moderate increased muscle tension in the iliococcygeus and coccygeus    Breathing pattern with contraction: wnl   Pelvic floor muscle relaxation is COMPLETE  PERFECT Score   Power right: 2+ TO 3-   Power left: 2+ TO 3-   Repetition: 5x   Endurance: 7s        Assessment: Tolerated treatment well  Patient deomstrates improvements in pelvic floor muscle strength, endurance, muscle awareness, and power  She also has had improvements in sleep pattern as she is not waking up as frequently  Increase confidence with urine control is evident  Pt has met all personal goals  She feels ready to be discharged to an HEP and is ready for independent management of symptoms  Has been educated on appropriate progression of exercises and will be discharged to an HEP  Plan: discharge to HEP               Manuals 09/06/22 09/14/22 10/03 10/10 10/17        PFM assessment  Verbal and written consent provided -IM  STM of intercostal muscles - im  stM PVM and intercostal muscles  Re-assement verbal and writtne consent          Daughter explained to mother what exam entailed via phone- IM   assessment of rib pain with deep breathing                                     Neuro Re-Ed             PFM supine biofeedback  req multi cues  req multi cues  education provided on PFM function and proper engagement-IM reviewed and educated w/ patient  iso supine, sitting and standing HEP provided 1x 10 5s hold ea  engaging core and LE muscles to try and do a kegel   engaging core and LE muscles to try and do a kegel   engaging core and LE w/ PFm req cues  PFM iso supine and sitting and stnding x 10 cues req          Diaphragmatic breathing  Req cues- IM  req cues- im  education on bladder irritants  3 min  3 min           Liana stretch x 10 5s  open books x 10 5'  10s x 5 B  TA iso supine x 10 4s hold   Ta BRIDGE X 10 3S     TA heel ext x 10 B           Child pose x 10 5s  piriformis stretch- x 10'  10s x 5 B  clamshells supine w/ TA x 10           happy baby x 10 5s   KTC 10s x 5 B/L             TA standard x 10   TA w/ bridge 2 x 5     TA w/ heel slides x 10  TA fallouts x 15   Ea  LTR and KTC x 10 B         Ther Ex    TA fallsouts x 10            PFM iso x 20 requires cues and progression provided    PFM iso x 10 req cues sitting and supine   piriformis strtehc 10s x 5B            bridge x 10   jimi strtehc 10s x 5 B           clamshells x 10   seated lumbar stretch 10s x 5                                                                          Ther Activity                 educated on bladder irritants, PFM muscle function reviewed             Educated on HEP and progression    reviewed         Gait Training                                       Modalities

## 2022-10-24 PROBLEM — Z12.11 SCREEN FOR COLON CANCER: Status: RESOLVED | Noted: 2022-08-16 | Resolved: 2022-10-24

## 2022-10-25 ENCOUNTER — APPOINTMENT (OUTPATIENT)
Dept: OCCUPATIONAL THERAPY | Facility: CLINIC | Age: 77
End: 2022-10-25

## 2022-10-27 ENCOUNTER — APPOINTMENT (OUTPATIENT)
Dept: OCCUPATIONAL THERAPY | Facility: CLINIC | Age: 77
End: 2022-10-27

## 2022-11-16 ENCOUNTER — OFFICE VISIT (OUTPATIENT)
Dept: INTERNAL MEDICINE CLINIC | Facility: CLINIC | Age: 77
End: 2022-11-16

## 2022-11-16 VITALS
HEIGHT: 60 IN | WEIGHT: 175.2 LBS | SYSTOLIC BLOOD PRESSURE: 136 MMHG | BODY MASS INDEX: 34.4 KG/M2 | DIASTOLIC BLOOD PRESSURE: 82 MMHG | TEMPERATURE: 98.1 F | OXYGEN SATURATION: 99 % | HEART RATE: 88 BPM

## 2022-11-16 DIAGNOSIS — Z23 NEED FOR INFLUENZA VACCINATION: ICD-10-CM

## 2022-11-16 DIAGNOSIS — Z01.818 OTHER SPECIFIED PRE-OPERATIVE EXAMINATION: Primary | ICD-10-CM

## 2022-11-16 RX ORDER — POLYMYXIN B SULFATE AND TRIMETHOPRIM 1; 10000 MG/ML; [USP'U]/ML
SOLUTION OPHTHALMIC
COMMUNITY
Start: 2022-11-08

## 2022-11-16 RX ORDER — PREDNISOLONE ACETATE 10 MG/ML
SUSPENSION/ DROPS OPHTHALMIC
COMMUNITY
Start: 2022-11-08

## 2022-11-16 NOTE — PROGRESS NOTES
INTERNAL MEDICINE PRE-OPERATIVE EVALUATION  [facility] - [facility]    NAME: Zeyad Jang  AGE: 78 y o  SEX: female  : 1945     DATE: 2022     Internal Medicine Pre-Operative Evaluation:     Chief Complaint: Pre-operative Evaluation     Surgery: Cataract surgery  Anticipated Date of Surgery: 2022 (Right eye), 2022 (Left eye)  Referring Provider: No ref  provider found        History of Present Illness:     Zeyad Jang is a 78 y o  female who presents to the office today for a preoperative consultation at the request of surgeon, Dr Anastasiya Mccallum, who plans on performing cataract surgery on the right eye  Planned anesthesia is local  Patient has a bleeding risk of: no recent abnormal bleeding  Patient does not have objections to receiving blood products if needed  Current anti-platelet/anti-coagulation medications that the patient is prescribed includes: Aspirin  Assessment of Chronic Conditions:   - Cerebrovascular Disease: Patient was recently admitted at [address] [place] [address] for TIA  She was started on aspirin at discharge  - Hypertension: BP stable in the office this morning  Patient continues to take amlodipine and lisinopril       Assessment of Cardiac Risk:  · Denies unstable or severe angina or MI in the last 6 weeks or history of stent placement in the last year   · Denies decompensated heart failure (e g  New onset heart failure, NYHA functional class IV heart failure, or worsening existing heart failure)  · Denies significant arrhythmias such as high grade AV block, symptomatic ventricular arrhythmia, newly recognized ventricular tachycardia, supraventricular tachycardia with resting heart rate >100, or symptomatic bradycardia  · Denies severe heart valve disease including aortic stenosis or symptomatic mitral stenosis     Exercise Capacity:  · Able to walk 4 blocks without symptoms?: Yes  · Able to walk 2 flights without symptoms?: Yes    Prior Anesthesia Reactions: No     Personal history of venous thromboembolic disease? No    History of steroid use for >2 weeks within last year? No    STOP-BANG Sleep Apnea Screening Questionnaire:      Do you SNORE loudly (louder than talking or loud enough to be heard through closed doors)? No   Do you often feel TIRED, fatigued, or sleepy during daytime? Yes   Has anyone OBSERVED you stop breathing during your sleep? No   Do you have or are you being treated for high blood pressure? Yes   BMI more than 35 kg/m2? No   AGE over 48years old? Yes   NECK circumference > 16 inches (40 cm)? No   Male GENDER? No   TOTAL SCORE 3       Review of Systems:     Review of Systems   Constitutional: Negative for chills and fever  HENT: Negative for rhinorrhea and sore throat  Eyes: Negative for pain and visual disturbance  Respiratory: Negative for cough and shortness of breath  Cardiovascular: Negative for chest pain and leg swelling  Gastrointestinal: Negative for abdominal pain, nausea and vomiting  Endocrine: Negative for polyuria  Genitourinary: Negative for frequency and urgency  Musculoskeletal: Negative for arthralgias and myalgias  Skin: Negative for color change and rash  Neurological: Negative for light-headedness and headaches  Psychiatric/Behavioral: Negative for confusion  The patient is not nervous/anxious           Problem List:     Patient Active Problem List   Diagnosis   • Weakness of right hand   • Hypertensive urgency   • Intracranial aneurysm   • Hyperlipidemia   • Primary hypertension   • Cerebrovascular accident (CVA) due to occlusion of precerebral artery (HCC)   • Other specified pre-operative examination   • Urge incontinence   • Sore throat   • History of stroke        Allergies:     No Known Allergies     Current Medications:       Current Outpatient Medications:   •  amLODIPine (NORVASC) 5 mg tablet, Take 1 tablet (5 mg total) by mouth daily, Disp: 30 tablet, Rfl: 2  •  aspirin (ECOTRIN LOW STRENGTH) 81 mg EC tablet, Take 1 tablet (81 mg total) by mouth daily, Disp: 30 tablet, Rfl: 2  •  atorvastatin (LIPITOR) 80 mg tablet, Take 0 5 tablets (40 mg total) by mouth every evening, Disp: 30 tablet, Rfl: 2  •  lisinopril (ZESTRIL) 5 mg tablet, TAKE ONE TABLET BY MOUTH EVERY DAY, Disp: 30 tablet, Rfl: 1  •  polymyxin b-trimethoprim (POLYTRIM) ophthalmic solution, , Disp: , Rfl:   •  prednisoLONE acetate (PRED FORTE) 1 % ophthalmic suspension, , Disp: , Rfl:   •  benzonatate (TESSALON) 200 MG capsule, Take 1 capsule (200 mg total) by mouth 3 (three) times a day as needed for cough (Patient not taking: Reported on 11/16/2022), Disp: 20 capsule, Rfl: 0  •  oxybutynin (DITROPAN-XL) 5 mg 24 hr tablet, Take 1 tablet (5 mg total) by mouth daily at bedtime Take 2 5mg (0 5 tablet) at night (Patient not taking: Reported on 11/16/2022), Disp: 30 tablet, Rfl: 1     Past History:     Past Medical History:   Diagnosis Date   • Hyperlipidemia    • Hypertension    • Kidney stones     hx of        Past Surgical History:   Procedure Laterality Date   • COLONOSCOPY          Family History   Problem Relation Age of Onset   • No Known Problems Mother    • No Known Problems Father         Social History     Socioeconomic History   • Marital status: Single     Spouse name: Not on file   • Number of children: Not on file   • Years of education: Not on file   • Highest education level: Not on file   Occupational History   • Not on file   Tobacco Use   • Smoking status: Never   • Smokeless tobacco: Never   Vaping Use   • Vaping Use: Never used   Substance and Sexual Activity   • Alcohol use: Never   • Drug use: Never   • Sexual activity: Not Currently   Other Topics Concern   • Not on file   Social History Narrative   • Not on file     Social Determinants of Health     Financial Resource Strain: Not on file   Food Insecurity: Not on file   Transportation Needs: Not on file   Physical Activity: Not on file   Stress: Not on file   Social Connections: Not on file   Intimate Partner Violence: Not on file   Housing Stability: Not on file        Physical Exam:      /82 (BP Location: Right arm, Patient Position: Sitting, Cuff Size: Standard)   Pulse 88   Temp 98 1 °F (36 7 °C) (Tympanic)   Ht 5' (1 524 m)   Wt 79 5 kg (175 lb 3 2 oz)   SpO2 99%   BMI 34 22 kg/m²     Physical Exam  Constitutional:       General: She is not in acute distress  Appearance: Normal appearance  She is obese  She is not ill-appearing  HENT:      Head: Normocephalic and atraumatic  Right Ear: Hearing and external ear normal       Left Ear: Hearing and external ear normal       Nose: Nose normal       Mouth/Throat:      Mouth: Mucous membranes are moist    Eyes:      General: Lids are normal       Extraocular Movements: Extraocular movements intact  Conjunctiva/sclera: Conjunctivae normal       Pupils: Pupils are equal, round, and reactive to light  Cardiovascular:      Rate and Rhythm: Normal rate and regular rhythm  Pulses: Normal pulses  Heart sounds: Normal heart sounds, S1 normal and S2 normal  No murmur heard  No friction rub  No gallop  Pulmonary:      Effort: Pulmonary effort is normal  No respiratory distress  Breath sounds: Normal breath sounds  No decreased breath sounds, wheezing, rhonchi or rales  Abdominal:      General: Abdomen is flat  Bowel sounds are normal  There is no distension  Palpations: Abdomen is soft  Tenderness: There is no abdominal tenderness  There is no guarding or rebound  Musculoskeletal:         General: No tenderness  Normal range of motion  Cervical back: Normal range of motion and neck supple  Right lower leg: No edema  Left lower leg: No edema  Skin:     General: Skin is warm and dry  Neurological:      General: No focal deficit present        Mental Status: She is alert and oriented to person, place, and time  Mental status is at baseline  Sensory: Sensation is intact  Motor: Motor function is intact  Psychiatric:         Attention and Perception: Attention normal          Mood and Affect: Mood normal          Speech: Speech normal          Behavior: Behavior normal  Behavior is cooperative  Data:     Pre-operative work-up    Laboratory Results: I have personally reviewed the pertinent laboratory results/reports     EKG: I have personally reviewed pertinent reports  Chest x-ray: No pertinent chest x-ray    Previous cardiopulmonary studies within the past year:  · Echocardiogram: Echo last done on 7/30/2022 with EF of 65%  · Cardiac Catheterization: None  · Stress Test: None  · Pulmonary Function Testing: None       Assessment:     1  Need for influenza vaccination  influenza vaccine, high-dose, PF 0 7 mL (FLUZONE HIGH-DOSE)      2  Other specified pre-operative examination             Plan:     78 y o  female with planned surgery: Cataract surgery  Cardiac Risk Estimation: per the Revised Cardiac Risk Index (Circ  100:1043, 1999), the patient's risk factors for cardiac complications include history of cerebrovascular disease, putting her in: RCI RISK CLASS II (1 risk factor, risk of major cardiac compl  appr  1 3%)  1  Further preoperative workup as follows:   None    2  Medication Management/Recommendations:   - None, continue medication regimen including morning of surgery, with sip of water    3  Prophylaxis for cardiac events with perioperative beta-blockers: not indicated    4  Patient requires further consultation with: None    Clearance  Patient is medically cleared       [address] [facility] [address] [address]  Phone#  [phone]  Fax#  [phone]

## 2022-11-16 NOTE — ASSESSMENT & PLAN NOTE
· Patient to have cataract surgery on the right eye on 11/21/2022 followed by surgery on the left eye on 12/05/2022  Plan:  · Patient is medically cleared for the operations  · Instructed patient to continue taking all of her medications on the morning of surgery

## 2022-11-20 DIAGNOSIS — I10 PRIMARY HYPERTENSION: ICD-10-CM

## 2022-11-20 DIAGNOSIS — I63.20 CEREBROVASCULAR ACCIDENT (CVA) DUE TO OCCLUSION OF PRECEREBRAL ARTERY (HCC): ICD-10-CM

## 2022-11-22 RX ORDER — LISINOPRIL 5 MG/1
TABLET ORAL
Qty: 30 TABLET | Refills: 1 | Status: SHIPPED | OUTPATIENT
Start: 2022-11-22

## 2022-11-22 RX ORDER — ASPIRIN 81 MG/1
TABLET, COATED ORAL
Qty: 30 TABLET | Refills: 2 | Status: SHIPPED | OUTPATIENT
Start: 2022-11-22

## 2022-11-22 RX ORDER — AMLODIPINE BESYLATE 5 MG/1
TABLET ORAL
Qty: 30 TABLET | Refills: 2 | Status: SHIPPED | OUTPATIENT
Start: 2022-11-22

## 2022-11-22 NOTE — TELEPHONE ENCOUNTER
Dandre Raya has requested refills of amLODIPine (NORVASC) 5 mg tablet, lisinopril (ZESTRIL) 5 mg tablet, and aspirin low dose 81 MG EC Tablet  Pt meets the requirements placed by [facility]  Will refill medication

## 2022-11-26 DIAGNOSIS — I10 PRIMARY HYPERTENSION: ICD-10-CM

## 2022-11-26 DIAGNOSIS — I63.20 CEREBROVASCULAR ACCIDENT (CVA) DUE TO OCCLUSION OF PRECEREBRAL ARTERY (HCC): ICD-10-CM

## 2022-11-28 RX ORDER — ASPIRIN 81 MG/1
TABLET, COATED ORAL
Qty: 30 TABLET | Refills: 2 | OUTPATIENT
Start: 2022-11-28

## 2022-11-28 RX ORDER — AMLODIPINE BESYLATE 5 MG/1
TABLET ORAL
Qty: 30 TABLET | Refills: 2 | OUTPATIENT
Start: 2022-11-28

## 2022-11-28 RX ORDER — LISINOPRIL 5 MG/1
TABLET ORAL
Qty: 30 TABLET | Refills: 1 | OUTPATIENT
Start: 2022-11-28

## 2022-11-28 NOTE — TELEPHONE ENCOUNTER
Gladys Mcghee has requested refills of lisinopril, aspirin, and amlodipine  Refill requests too soon  Last ordered 6 days ago by Dr Pete Verdin  Will refuse refill requests

## 2023-01-16 DIAGNOSIS — I10 PRIMARY HYPERTENSION: ICD-10-CM

## 2023-01-17 RX ORDER — LISINOPRIL 5 MG/1
TABLET ORAL
Qty: 30 TABLET | Refills: 1 | Status: SHIPPED | OUTPATIENT
Start: 2023-01-17

## 2023-01-17 NOTE — TELEPHONE ENCOUNTER
Debra Dale has requested a refill of lisinopril (ZESTRIL) 5 mg tablet  Pt meets the requirements placed by [facility]  Will refill medication

## 2023-03-31 DIAGNOSIS — I10 PRIMARY HYPERTENSION: ICD-10-CM

## 2023-03-31 DIAGNOSIS — I63.20 CEREBROVASCULAR ACCIDENT (CVA) DUE TO OCCLUSION OF PRECEREBRAL ARTERY (HCC): ICD-10-CM

## 2023-03-31 RX ORDER — ATORVASTATIN CALCIUM 80 MG/1
TABLET, FILM COATED ORAL
Qty: 30 TABLET | Refills: 2 | Status: SHIPPED | OUTPATIENT
Start: 2023-03-31

## 2023-03-31 RX ORDER — AMLODIPINE BESYLATE 5 MG/1
5 TABLET ORAL DAILY
Qty: 90 TABLET | Refills: 2 | Status: SHIPPED | OUTPATIENT
Start: 2023-03-31

## 2023-03-31 RX ORDER — LISINOPRIL 5 MG/1
5 TABLET ORAL DAILY
Qty: 90 TABLET | Refills: 1 | Status: SHIPPED | OUTPATIENT
Start: 2023-03-31

## 2023-03-31 NOTE — TELEPHONE ENCOUNTER
[place] going [place] , she needs a 90 days supply with refills   She is leaving tomorrow

## 2023-04-22 DIAGNOSIS — I63.20 CEREBROVASCULAR ACCIDENT (CVA) DUE TO OCCLUSION OF PRECEREBRAL ARTERY (HCC): ICD-10-CM

## 2023-04-24 RX ORDER — ASPIRIN 81 MG/1
TABLET, COATED ORAL
Qty: 30 TABLET | Refills: 2 | Status: SHIPPED | OUTPATIENT
Start: 2023-04-24

## 2023-11-21 ENCOUNTER — OFFICE VISIT (OUTPATIENT)
Dept: INTERNAL MEDICINE CLINIC | Facility: CLINIC | Age: 78
End: 2023-11-21
Payer: MEDICARE

## 2023-11-21 VITALS
DIASTOLIC BLOOD PRESSURE: 92 MMHG | RESPIRATION RATE: 16 BRPM | TEMPERATURE: 98 F | OXYGEN SATURATION: 97 % | WEIGHT: 168 LBS | HEIGHT: 61 IN | SYSTOLIC BLOOD PRESSURE: 144 MMHG | BODY MASS INDEX: 31.72 KG/M2 | HEART RATE: 88 BPM

## 2023-11-21 DIAGNOSIS — Z11.59 NEED FOR HEPATITIS C SCREENING TEST: ICD-10-CM

## 2023-11-21 DIAGNOSIS — I63.20 CEREBROVASCULAR ACCIDENT (CVA) DUE TO OCCLUSION OF PRECEREBRAL ARTERY (HCC): ICD-10-CM

## 2023-11-21 DIAGNOSIS — Z00.00 WELCOME TO MEDICARE PREVENTIVE VISIT: Primary | ICD-10-CM

## 2023-11-21 DIAGNOSIS — Z00.00 MEDICARE ANNUAL WELLNESS VISIT, SUBSEQUENT: ICD-10-CM

## 2023-11-21 DIAGNOSIS — N39.41 URGE INCONTINENCE: ICD-10-CM

## 2023-11-21 DIAGNOSIS — R45.89 DEPRESSED MOOD: ICD-10-CM

## 2023-11-21 DIAGNOSIS — I10 PRIMARY HYPERTENSION: ICD-10-CM

## 2023-11-21 PROBLEM — I63.81 LACUNAR INFARCTION (HCC): Status: ACTIVE | Noted: 2022-08-02

## 2023-11-21 PROBLEM — Z01.818 OTHER SPECIFIED PRE-OPERATIVE EXAMINATION: Status: RESOLVED | Noted: 2022-08-16 | Resolved: 2023-11-21

## 2023-11-21 PROBLEM — Z86.73 HISTORY OF STROKE: Status: RESOLVED | Noted: 2022-09-21 | Resolved: 2023-11-21

## 2023-11-21 PROBLEM — I16.0 HYPERTENSIVE URGENCY: Status: RESOLVED | Noted: 2022-07-29 | Resolved: 2023-11-21

## 2023-11-21 PROBLEM — J02.9 SORE THROAT: Status: RESOLVED | Noted: 2022-09-07 | Resolved: 2023-11-21

## 2023-11-21 PROBLEM — F33.9 DEPRESSION, RECURRENT (HCC): Status: ACTIVE | Noted: 2023-11-21

## 2023-11-21 PROCEDURE — G0439 PPPS, SUBSEQ VISIT: HCPCS

## 2023-11-21 RX ORDER — AMLODIPINE BESYLATE 5 MG/1
5 TABLET ORAL DAILY
Qty: 90 TABLET | Refills: 2 | Status: SHIPPED | OUTPATIENT
Start: 2023-11-21

## 2023-11-21 RX ORDER — ATORVASTATIN CALCIUM 80 MG/1
TABLET, FILM COATED ORAL
Qty: 30 TABLET | Refills: 2 | Status: SHIPPED | OUTPATIENT
Start: 2023-11-21

## 2023-11-21 RX ORDER — LISINOPRIL 5 MG/1
5 TABLET ORAL DAILY
Qty: 90 TABLET | Refills: 1 | Status: SHIPPED | OUTPATIENT
Start: 2023-11-21

## 2023-11-21 RX ORDER — ASPIRIN 81 MG/1
81 TABLET ORAL DAILY
Qty: 30 TABLET | Refills: 2 | Status: SHIPPED | OUTPATIENT
Start: 2023-11-21

## 2023-11-21 NOTE — PATIENT INSTRUCTIONS
Medicare Preventive Visit Patient Instructions  Thank you for completing your Welcome to Medicare Visit or Medicare Annual Wellness Visit today. Your next wellness visit will be due in one year (11/21/2024). The screening/preventive services that you may require over the next 5-10 years are detailed below. Some tests may not apply to you based off risk factors and/or age. Screening tests ordered at today's visit but not completed yet may show as past due. Also, please note that scanned in results may not display below. Preventive Screenings:  Service Recommendations Previous Testing/Comments   Colorectal Cancer Screening  * Colonoscopy    * Fecal Occult Blood Test (FOBT)/Fecal Immunochemical Test (FIT)  * Fecal DNA/Cologuard Test  * Flexible Sigmoidoscopy Age: 43-73 years old   Colonoscopy: every 10 years (may be performed more frequently if at higher risk)  OR  FOBT/FIT: every 1 year  OR  Cologuard: every 3 years  OR  Sigmoidoscopy: every 5 years  Screening may be recommended earlier than age 39 if at higher risk for colorectal cancer. Also, an individualized decision between you and your healthcare provider will decide whether screening between the ages of 77-80 would be appropriate. Colonoscopy: 09/16/2022  FOBT/FIT: Not on file  Cologuard: Not on file  Sigmoidoscopy: Not on file          Breast Cancer Screening Age: 36 years old  Frequency: every 1-2 years  Not required if history of left and right mastectomy Mammogram: Not on file        Cervical Cancer Screening Between the ages of 21-29, pap smear recommended once every 3 years. Between the ages of 32-69, can perform pap smear with HPV co-testing every 5 years.    Recommendations may differ for women with a history of total hysterectomy, cervical cancer, or abnormal pap smears in past. Pap Smear: Not on file    Screening Not Indicated   Hepatitis C Screening Once for adults born between [address] [place] [address]  More frequently in patients at high risk for Hepatitis C Hep C Antibody: Not on file        Diabetes Screening 1-2 times per year if you're at risk for diabetes or have pre-diabetes Fasting glucose: No results in last 5 years (No results in last 5 years)  A1C: 5.6 % (7/30/2022)      Cholesterol Screening Once every 5 years if you don't have a lipid disorder. May order more often based on risk factors. Lipid panel: 07/30/2022    Screening Not Indicated  History Lipid Disorder     Other Preventive Screenings Covered by Medicare:  Abdominal Aortic Aneurysm (AAA) Screening: covered once if your at risk. You're considered to be at risk if you have a family history of AAA. Lung Cancer Screening: covers low dose CT scan once per year if you meet all of the following conditions: (1) Age 48-67; (2) No signs or symptoms of lung cancer; (3) Current smoker or have quit smoking within the last 15 years; (4) You have a tobacco smoking history of at least 20 pack years (packs per day multiplied by number of years you smoked); (5) You get a written order from a healthcare provider. Glaucoma Screening: covered annually if you're considered high risk: (1) You have diabetes OR (2) Family history of glaucoma OR (3)  aged 48 and older OR (3)  American aged 72 and older  Osteoporosis Screening: covered every 2 years if you meet one of the following conditions: (1) You're estrogen deficient and at risk for osteoporosis based off medical history and other findings; (2) Have a vertebral abnormality; (3) On glucocorticoid therapy for more than 3 months; (4) Have primary hyperparathyroidism; (5) On osteoporosis medications and need to assess response to drug therapy. Last bone density test (DXA Scan): 09/12/2022. HIV Screening: covered annually if you're between the age of 14-79. Also covered annually if you are younger than 13 and older than 72 with risk factors for HIV infection.  For pregnant patients, it is covered up to 3 times per pregnancy. Immunizations:  Immunization Recommendations   Influenza Vaccine Annual influenza vaccination during flu season is recommended for all persons aged >= 6 months who do not have contraindications   Pneumococcal Vaccine   * Pneumococcal conjugate vaccine = PCV13 (Prevnar 13), PCV15 (Vaxneuvance), PCV20 (Prevnar 20)  * Pneumococcal polysaccharide vaccine = PPSV23 (Pneumovax) Adults 87-90 yo with certain risk factors or if 69+ yo  If never received any pneumonia vaccine: recommend Prevnar 20 (PCV20)  Give PCV20 if previously received 1 dose of PCV13 or PPSV23   Hepatitis B Vaccine 3 dose series if at intermediate or high risk (ex: diabetes, end stage renal disease, liver disease)   Respiratory syncytial virus (RSV) Vaccine - COVERED BY MEDICARE PART D  * RSVPreF3 (Arexvy) CDC recommends that adults 61years of age and older may receive a single dose of RSV vaccine using shared clinical decision-making (SCDM)   Tetanus (Td) Vaccine - COST NOT COVERED BY MEDICARE PART B Following completion of primary series, a booster dose should be given every 10 years to maintain immunity against tetanus. Td may also be given as tetanus wound prophylaxis. Tdap Vaccine - COST NOT COVERED BY MEDICARE PART B Recommended at least once for all adults. For pregnant patients, recommended with each pregnancy. Shingles Vaccine (Shingrix) - COST NOT COVERED BY MEDICARE PART B  2 shot series recommended in those 19 years and older who have or will have weakened immune systems or those 50 years and older     Health Maintenance Due:      Topic Date Due   • Hepatitis C Screening  Never done   • Colorectal Cancer Screening  Discontinued     Immunizations Due:      Topic Date Due   • COVID-19 Vaccine (1) Never done   • Pneumococcal Vaccine: 65+ Years (1 - PCV) Never done   • Influenza Vaccine (1) 09/01/2023     Advance Directives   What are advance directives?   Advance directives are legal documents that state your wishes and plans for medical care. These plans are made ahead of time in case you lose your ability to make decisions for yourself. Advance directives can apply to any medical decision, such as the treatments you want, and if you want to donate organs. What are the types of advance directives? There are many types of advance directives, and each state has rules about how to use them. You may choose a combination of any of the following:  Living will: This is a written record of the treatment you want. You can also choose which treatments you do not want, which to limit, and which to stop at a certain time. This includes surgery, medicine, IV fluid, and tube feedings. Durable power of  for healthcare [facility] [place]): This is a written record that states who you want to make healthcare choices for you when you are unable to make them for yourself. This person, called a proxy, is usually a family member or a friend. You may choose more than 1 proxy. Do not resuscitate (DNR) order:  A DNR order is used in case your heart stops beating or you stop breathing. It is a request not to have certain forms of treatment, such as CPR. A DNR order may be included in other types of advance directives. Medical directive: This covers the care that you want if you are in a coma, near death, or unable to make decisions for yourself. You can list the treatments you want for each condition. Treatment may include pain medicine, surgery, blood transfusions, dialysis, IV or tube feedings, and a ventilator (breathing machine). Values history: This document has questions about your views, beliefs, and how you feel and think about life. This information can help others choose the care that you would choose. Why are advance directives important? An advance directive helps you control your care. Although spoken wishes may be used, it is better to have your wishes written down. Spoken wishes can be misunderstood, or not followed.  Treatments may be given even if you do not want them. An advance directive may make it easier for your family to make difficult choices about your care. Depression   Depression  is a medical condition that causes feelings of sadness or hopelessness that do not go away. Depression may cause you to lose interest in things you used to enjoy. These feelings may interfere with your daily life. Call your local emergency number (911 in the [address]) if:   You think about harming yourself or someone else. You have done something on purpose to hurt yourself. The following resources are available at any time to help you, if needed:   [facility]: 7-881-683-371-871-3958 (8-776-752-SDLN)   Suicide Hotline: 4-108.284.6796 (6-366-LPPWAXZ)   For a list of international numbers: https://[employer].org/find-help/international-resources/  Treatment for depression may include medicine to relieve depression. Medicine is often used together with therapy. Therapy is a way for you to talk about your feelings and anything that may be causing depression. Therapy can be done alone or in a group. It may also be done with family members or a significant other. Get regular physical activity. Create a regular sleep schedule. Eat a variety of healthy foods. Do not drink alcohol or use drugs. Urinary Incontinence   Urinary incontinence (UI)  is when you lose control of your bladder. UI develops because your bladder cannot store or empty urine properly. The 3 most common types of UI are stress incontinence, urge incontinence, or both. Medicines:   May be given to help strengthen your bladder control. Report any side effects of medication to your healthcare provider. Do pelvic muscle exercises often:  Your pelvic muscles help you stop urinating. Squeeze these muscles tight for 5 seconds, then relax for 5 seconds. Gradually work up to squeezing for 10 seconds. Do 3 sets of 15 repetitions a day, or as directed.  This will help strengthen your pelvic muscles and improve bladder control. Train your bladder:  Go to the bathroom at set times, such as every 2 hours, even if you do not feel the urge to go. You can also try to hold your urine when you feel the urge to go. For example, hold your urine for 5 minutes when you feel the urge to go. As that becomes easier, hold your urine for 10 minutes. Self-care:   Keep a UI record. Write down how often you leak urine and how much you leak. Make a note of what you were doing when you leaked urine. Drink liquids as directed. You may need to limit the amount of liquid you drink to help control your urine leakage. Do not drink any liquid right before you go to bed. Limit or do not have drinks that contain caffeine or alcohol. Prevent constipation. Eat a variety of high-fiber foods. Good examples are high-fiber cereals, beans, vegetables, and whole-grain breads. Walking is the best way to trigger your intestines to have a bowel movement. Exercise regularly and maintain a healthy weight. Weight loss and exercise will decrease pressure on your bladder and help you control your leakage. Use a catheter as directed  to help empty your bladder. A catheter is a tiny, plastic tube that is put into your bladder to drain your urine. Go to behavior therapy as directed. Behavior therapy may be used to help you learn to control your urge to urinate. Weight Management   Why it is important to manage your weight:  Being overweight increases your risk of health conditions such as heart disease, high blood pressure, type 2 diabetes, and certain types of cancer. It can also increase your risk for osteoarthritis, sleep apnea, and other respiratory problems. Aim for a slow, steady weight loss. Even a small amount of weight loss can lower your risk of health problems. How to lose weight safely:  A safe and healthy way to lose weight is to eat fewer calories and get regular exercise.  You can lose up about 1 pound a week by decreasing the number of calories you eat by 500 calories each day. Healthy meal plan for weight management:  A healthy meal plan includes a variety of foods, contains fewer calories, and helps you stay healthy. A healthy meal plan includes the following:  Eat whole-grain foods more often. A healthy meal plan should contain fiber. Fiber is the part of grains, fruits, and vegetables that is not broken down by your body. Whole-grain foods are healthy and provide extra fiber in your diet. Some examples of whole-grain foods are whole-wheat breads and pastas, oatmeal, brown rice, and bulgur. Eat a variety of vegetables every day. Include dark, leafy greens such as spinach, kale, ann greens, and mustard greens. Eat yellow and orange vegetables such as carrots, sweet potatoes, and winter squash. Eat a variety of fruits every day. Choose fresh or canned fruit (canned in its own juice or light syrup) instead of juice. Fruit juice has very little or no fiber. Eat low-fat dairy foods. Drink fat-free (skim) milk or 1% milk. Eat fat-free yogurt and low-fat cottage cheese. Try low-fat cheeses such as mozzarella and other reduced-fat cheeses. Choose meat and other protein foods that are low in fat. Choose beans or other legumes such as split peas or lentils. Choose fish, skinless poultry (chicken or turkey), or lean cuts of red meat (beef or pork). Before you cook meat or poultry, cut off any visible fat. Use less fat and oil. Try baking foods instead of frying them. Add less fat, such as margarine, sour cream, regular salad dressing and mayonnaise to foods. Eat fewer high-fat foods. Some examples of high-fat foods include french fries, doughnuts, ice cream, and cakes. Eat fewer sweets. Limit foods and drinks that are high in sugar. This includes candy, cookies, regular soda, and sweetened drinks. Exercise:  Exercise at least 30 minutes per day on most days of the week.  Some examples of exercise include walking, biking, dancing, and swimming. You can also fit in more physical activity by taking the stairs instead of the elevator or parking farther away from stores. Ask your healthcare provider about the best exercise plan for you. © Copyright [employer] 2018 Information is for End User's use only and may not be sold, redistributed or otherwise used for commercial purposes.  All illustrations and images included in CareNotes® are the copyrighted property of [employer] or [address] [place] [address]

## 2023-11-21 NOTE — ASSESSMENT & PLAN NOTE
-Was visiting [facility] and ran out of her medications 2 months ago.  -Has had intermittent right-sided headache, no visual deficits no other focal neurological deficits.  -Blood pressure elevated at 144/92 in clinic today. Medications refilled  Patient advised to check her blood pressure twice a day 3 times a week and bring blood pressure readings to follow-up visit in 1 month.

## 2023-11-21 NOTE — PROGRESS NOTES
Assessment and Plan:     Problem List Items Addressed This Visit       Primary hypertension     -Was visiting [facility] and ran out of her medications 2 months ago.  -Has had intermittent right-sided headache, no visual deficits no other focal neurological deficits.  -Blood pressure elevated at 144/92 in clinic today. Medications refilled  Patient advised to check her blood pressure twice a day 3 times a week and bring blood pressure readings to follow-up visit in 1 month. Relevant Medications    amLODIPine (NORVASC) 5 mg tablet    lisinopril (ZESTRIL) 5 mg tablet    Urge incontinence    RESOLVED: Depressed mood     Other Visit Diagnoses       Welcome to Medicare preventive visit    -  Primary    Cerebrovascular accident (CVA) due to occlusion of precerebral artery (HCC)        Relevant Medications    aspirin (Aspirin Low Dose) 81 mg EC tablet    atorvastatin (LIPITOR) 80 mg tablet    Medicare annual wellness visit, subsequent        Relevant Orders    CBC and differential    Comprehensive metabolic panel    Lipid panel    HEMOGLOBIN A1C W/ EAG ESTIMATION    TSH w/Reflex            Depression Screening and Follow-up Plan: Patient's depression screening was positive with a PHQ-2 score of 4. Their PHQ-9 score was 8. Clincally patient does not have depression. No treatment is required. Patient states she feels sad off and on when she was alone in [place]. She has currently moved into a house with her daughter and is feeling better as her daughter is caring an affectionate towards her but she does worry about her relatives in [place]. Will follow up in a month and repeat PHQ-2 and 9. Urinary Incontinence Plan of Care: counseling topics discussed: limiting fluid intake 3-4 hours before bed and limiting fluid intake to 60 oz. per day. Risks and benefits of pharmacotherapy was reviewed and patient was prescribed Antimuscarinics. Patient trialed Diptropan XL, didn't help her symptoms.  Symptoms currently not severe enough to warrant pharmacotherapy. .       Preventive health issues were discussed with patient, and age appropriate screening tests were ordered as noted in patient's After Visit Summary. Personalized health advice and appropriate referrals for health education or preventive services given if needed, as noted in patient's After Visit Summary. History of Present Illness:     Patient presents for a Medicare Wellness Visit    Ms. Georges Tinoco is a 70 Y. [place] Said who presents to the clinic for medicare annual wellness visit. She has been followed for the last 6 months and just returned home. She states overall her health is well but she did feel off-and-on sad while she was in [facility] due to being there alone. She ran out of her blood pressure and cholesterol medications about 2 months ago and has been noted intermittent right-sided headache which is alleviated with Advil. Patient does endorse some fatigue and nocturia along with occasional incontinence while going to the restroom to urinate. She denies any fevers or chills, chest pain, shortness of breath or any other complaint at this time. Fatigue  Associated symptoms include fatigue. Pertinent negatives include no abdominal pain, arthralgias, chest pain, congestion, headaches, myalgias, nausea, sore throat, vomiting or weakness. Patient Care Team:  Adrien Escalona MD as PCP - General (Internal Medicine)     Review of Systems:     Review of Systems   Constitutional:  Positive for fatigue. HENT:  Negative for congestion, hearing loss, postnasal drip, rhinorrhea, sore throat and trouble swallowing. Respiratory:  Negative for chest tightness, shortness of breath and wheezing. Cardiovascular:  Negative for chest pain, palpitations and leg swelling. Gastrointestinal:  Negative for abdominal distention, abdominal pain, blood in stool, constipation, diarrhea, nausea and vomiting. Genitourinary:  Positive for enuresis (Intermittent).  Negative for dysuria, hematuria and urgency. Nocturia   Musculoskeletal:  Negative for arthralgias, back pain, gait problem and myalgias. Skin:  Negative for color change and wound. Neurological:  Negative for tremors, weakness and headaches. Psychiatric/Behavioral:  Positive for dysphoric mood (Felt sad when she was alone in [facility]). Negative for confusion, decreased concentration, sleep disturbance and suicidal ideas.          Problem List:     Patient Active Problem List   Diagnosis    Weakness of right hand    Intracranial aneurysm, incidental 4 mm right supraclinoid ICA aneurysm    Hyperlipidemia    Primary hypertension    Lacunar infarction [facility])    Urge incontinence      Past Medical and Surgical History:     Past Medical History:   Diagnosis Date    History of stroke 09/21/2022    Hyperlipidemia     Hypertension     Hypertensive urgency 07/29/2022    Kidney stones     hx of    Other specified pre-operative examination 08/16/2022     Past Surgical History:   Procedure Laterality Date    COLONOSCOPY        Family History:     Family History   Problem Relation Age of Onset    No Known Problems Mother     No Known Problems Father       Social History:     Social History     Socioeconomic History    Marital status: Single     Spouse name: None    Number of children: None    Years of education: None    Highest education level: None   Occupational History    None   Tobacco Use    Smoking status: Never    Smokeless tobacco: Never   Vaping Use    Vaping Use: Never used   Substance and Sexual Activity    Alcohol use: Never    Drug use: Never    Sexual activity: Not Currently   Other Topics Concern    None   Social History Narrative    None     Social Determinants of Health     Financial Resource Strain: Low Risk  (11/21/2023)    Overall Financial Resource Strain (CARDIA)     Difficulty of Paying Living Expenses: Not very hard   Food Insecurity: Not on file   Transportation Needs: No Transportation Needs (11/21/2023) PRAPARE - Transportation     Lack of Transportation (Medical): No     Lack of Transportation (Non-Medical): No   Physical Activity: Not on file   Stress: Not on file   Social Connections: Not on file   Intimate Partner Violence: Not on file   Housing Stability: Not on file      Medications and Allergies:     Current Outpatient Medications   Medication Sig Dispense Refill    amLODIPine (NORVASC) 5 mg tablet Take 1 tablet (5 mg total) by mouth daily 90 tablet 2    aspirin (Aspirin Low Dose) 81 mg EC tablet Take 1 tablet (81 mg total) by mouth daily 30 tablet 2    atorvastatin (LIPITOR) 80 mg tablet Take 1/2 tablet (40 mg total] by mouth every evening 30 tablet 2    lisinopril (ZESTRIL) 5 mg tablet Take 1 tablet (5 mg total) by mouth daily 90 tablet 1     No current facility-administered medications for this visit. No Known Allergies   Immunizations:     Immunization History   Administered Date(s) Administered    Influenza, high dose seasonal 0.7 mL 11/16/2022      Health Maintenance:         Topic Date Due    Hepatitis C Screening  Never done    Colorectal Cancer Screening  Discontinued         Topic Date Due    COVID-19 Vaccine (1) Never done    Pneumococcal Vaccine: 65+ Years (1 - PCV) Never done    Influenza Vaccine (1) 09/01/2023      Medicare Screening Tests and Risk Assessments:     Janes Bird is here for her Initial Wellness visit. Health Risk Assessment:   Patient rates overall health as very good. Patient feels that their physical health rating is slightly better. Patient is satisfied with their life. Eyesight was rated as same. Hearing was rated as same. Patient feels that their emotional and mental health rating is same. Patients states they are never, rarely angry. Patient states they are often unusually tired/fatigued. Pain experienced in the last 7 days has been some. Patient's pain rating has been 4/10. Patient states that she has experienced weight loss or gain in last 6 months.  Lost several pounds    Depression Screening:   PHQ-2 Score: 4  PHQ-9 Score: 8      Fall Risk Screening: In the past year, patient has experienced: no history of falling in past year      Urinary Incontinence Screening:   Patient has leaked urine accidently in the last six months. Home Safety:  Patient does not have trouble with stairs inside or outside of their home. Patient has working smoke alarms and has working carbon monoxide detector. Home safety hazards include: none. Nutrition:   Current diet is Regular. Medications:   Patient is not currently taking any over-the-counter supplements. Patient is able to manage medications. Activities of Daily Living (ADLs)/Instrumental Activities of Daily Living (IADLs):   Walk and transfer into and out of bed and chair?: Yes  Dress and groom yourself?: Yes    Bathe or shower yourself?: Yes    Feed yourself? Yes  Do your laundry/housekeeping?: Yes  Manage your money, pay your bills and track your expenses?: Yes  Make your own meals?: Yes    Do your own shopping?: Yes    Previous Hospitalizations:   Any hospitalizations or ED visits within the last 12 months?: No      Advance Care Planning:   Living will: No    Durable POA for healthcare: No    Advanced directive: No    Advanced directive counseling given: Yes    ACP document given: Yes    Patient declined ACP directive: No    End of Life Decisions reviewed with patient: Yes      Comments: Patient will review the ACP document with her daughter, completed and bring it back on her follow-up visit in a month.     PREVENTIVE SCREENINGS      Cardiovascular Screening:    General: Screening Not Indicated and History Lipid Disorder      Cervical Cancer Screening:    General: Screening Not Indicated      Lung Cancer Screening:     General: Screening Not Indicated    Screening, Brief Intervention, and Referral to Treatment (SBIRT)    Screening      AUDIT-C Screenin) How often did you have a drink containing alcohol in the past year? never  2) How many drinks did you have on a typical day when you were drinking in the past year? 0  3) How often did you have 6 or more drinks on one occasion in the past year? never    AUDIT-C Score: 0  Interpretation: Score 0-2 (female): Negative screen for alcohol misuse    Single Item Drug Screening:  How often have you used an illegal drug (including marijuana) or a prescription medication for non-medical reasons in the past year? never    Single Item Drug Screen Score: 0  Interpretation: Negative screen for possible drug use disorder    No results found. Physical Exam:     /92 (BP Location: Left arm, Patient Position: Sitting, Cuff Size: Adult)   Pulse 88   Temp 98 °F (36.7 °C) (Tympanic)   Resp 16   Ht 5' 1" (1.549 m)   Wt 76.2 kg (168 lb)   SpO2 97%   BMI 31.74 kg/m²     Physical Exam  Vitals reviewed. Constitutional:       Appearance: Normal appearance. She is obese. HENT:      Head: Normocephalic and atraumatic. Nose: Nose normal.      Mouth/Throat:      Mouth: Mucous membranes are moist.      Pharynx: Oropharynx is clear. No oropharyngeal exudate or posterior oropharyngeal erythema. Eyes:      General: No scleral icterus. Extraocular Movements: Extraocular movements intact. Conjunctiva/sclera: Conjunctivae normal.   Cardiovascular:      Rate and Rhythm: Normal rate and regular rhythm. Pulses: Normal pulses. Pulmonary:      Effort: Pulmonary effort is normal. No respiratory distress. Breath sounds: Normal breath sounds. No stridor. No wheezing. Abdominal:      General: Bowel sounds are normal. There is no distension. Palpations: Abdomen is soft. There is no mass. Tenderness: There is no abdominal tenderness. Musculoskeletal:         General: Normal range of motion. Cervical back: Normal range of motion. No rigidity or tenderness. Right lower leg: No edema. Left lower leg: No edema. Skin:     General: Skin is warm. Findings: No lesion or rash. Neurological:      Mental Status: She is alert and oriented to person, place, and time. Mental status is at baseline. Psychiatric:         Behavior: Behavior normal.         Thought Content:  Thought content normal.          Bradley Reis MD

## 2023-11-22 ENCOUNTER — APPOINTMENT (OUTPATIENT)
Dept: LAB | Facility: CLINIC | Age: 78
End: 2023-11-22
Payer: MEDICARE

## 2023-11-22 DIAGNOSIS — Z11.59 NEED FOR HEPATITIS C SCREENING TEST: ICD-10-CM

## 2023-11-22 DIAGNOSIS — Z00.00 MEDICARE ANNUAL WELLNESS VISIT, SUBSEQUENT: ICD-10-CM

## 2023-11-22 LAB
ALBUMIN SERPL BCP-MCNC: 3.8 G/DL (ref 3.5–5)
ALP SERPL-CCNC: 78 U/L (ref 34–104)
ALT SERPL W P-5'-P-CCNC: 11 U/L (ref 7–52)
ANION GAP SERPL CALCULATED.3IONS-SCNC: 4 MMOL/L
AST SERPL W P-5'-P-CCNC: 18 U/L (ref 13–39)
BASOPHILS # BLD AUTO: 0.04 THOUSANDS/ÂΜL (ref 0–0.1)
BASOPHILS NFR BLD AUTO: 1 % (ref 0–1)
BILIRUB SERPL-MCNC: 0.37 MG/DL (ref 0.2–1)
BUN SERPL-MCNC: 13 MG/DL (ref 5–25)
CALCIUM SERPL-MCNC: 8.5 MG/DL (ref 8.4–10.2)
CHLORIDE SERPL-SCNC: 107 MMOL/L (ref 96–108)
CHOLEST SERPL-MCNC: 208 MG/DL
CO2 SERPL-SCNC: 28 MMOL/L (ref 21–32)
CREAT SERPL-MCNC: 0.68 MG/DL (ref 0.6–1.3)
EOSINOPHIL # BLD AUTO: 0.21 THOUSAND/ÂΜL (ref 0–0.61)
EOSINOPHIL NFR BLD AUTO: 3 % (ref 0–6)
ERYTHROCYTE [DISTWIDTH] IN BLOOD BY AUTOMATED COUNT: 16.2 % (ref 11.6–15.1)
EST. AVERAGE GLUCOSE BLD GHB EST-MCNC: 123 MG/DL
GFR SERPL CREATININE-BSD FRML MDRD: 85 ML/MIN/1.73SQ M
GLUCOSE P FAST SERPL-MCNC: 85 MG/DL (ref 65–99)
HBA1C MFR BLD: 5.9 %
HCT VFR BLD AUTO: 39.9 % (ref 34.8–46.1)
HCV AB SER QL: NORMAL
HDLC SERPL-MCNC: 55 MG/DL
HGB BLD-MCNC: 12.5 G/DL (ref 11.5–15.4)
IMM GRANULOCYTES # BLD AUTO: 0.03 THOUSAND/UL (ref 0–0.2)
IMM GRANULOCYTES NFR BLD AUTO: 1 % (ref 0–2)
LDLC SERPL CALC-MCNC: 135 MG/DL (ref 0–100)
LYMPHOCYTES # BLD AUTO: 1.65 THOUSANDS/ÂΜL (ref 0.6–4.47)
LYMPHOCYTES NFR BLD AUTO: 26 % (ref 14–44)
MCH RBC QN AUTO: 24.4 PG (ref 26.8–34.3)
MCHC RBC AUTO-ENTMCNC: 31.3 G/DL (ref 31.4–37.4)
MCV RBC AUTO: 78 FL (ref 82–98)
MONOCYTES # BLD AUTO: 0.47 THOUSAND/ÂΜL (ref 0.17–1.22)
MONOCYTES NFR BLD AUTO: 7 % (ref 4–12)
NEUTROPHILS # BLD AUTO: 4.07 THOUSANDS/ÂΜL (ref 1.85–7.62)
NEUTS SEG NFR BLD AUTO: 62 % (ref 43–75)
NONHDLC SERPL-MCNC: 153 MG/DL
NRBC BLD AUTO-RTO: 0 /100 WBCS
PLATELET # BLD AUTO: 342 THOUSANDS/UL (ref 149–390)
PMV BLD AUTO: 9.4 FL (ref 8.9–12.7)
POTASSIUM SERPL-SCNC: 4.2 MMOL/L (ref 3.5–5.3)
PROT SERPL-MCNC: 6.8 G/DL (ref 6.4–8.4)
RBC # BLD AUTO: 5.12 MILLION/UL (ref 3.81–5.12)
SODIUM SERPL-SCNC: 139 MMOL/L (ref 135–147)
TRIGL SERPL-MCNC: 89 MG/DL
TSH SERPL DL<=0.05 MIU/L-ACNC: 3.23 UIU/ML (ref 0.45–4.5)
WBC # BLD AUTO: 6.47 THOUSAND/UL (ref 4.31–10.16)

## 2023-11-22 PROCEDURE — 83036 HEMOGLOBIN GLYCOSYLATED A1C: CPT

## 2023-11-22 PROCEDURE — 80053 COMPREHEN METABOLIC PANEL: CPT

## 2023-11-22 PROCEDURE — 84443 ASSAY THYROID STIM HORMONE: CPT

## 2023-11-22 PROCEDURE — 86803 HEPATITIS C AB TEST: CPT

## 2023-11-22 PROCEDURE — 85025 COMPLETE CBC W/AUTO DIFF WBC: CPT

## 2023-11-22 PROCEDURE — 36415 COLL VENOUS BLD VENIPUNCTURE: CPT

## 2023-11-22 PROCEDURE — 80061 LIPID PANEL: CPT

## 2023-11-24 ENCOUNTER — TELEPHONE (OUTPATIENT)
Dept: INTERNAL MEDICINE CLINIC | Facility: CLINIC | Age: 78
End: 2023-11-24

## 2023-11-24 NOTE — TELEPHONE ENCOUNTER
Called Patient, phone was answered by patient's daughter, reviewed lab results. Slight elevation in HbA1C, advised to moderate the consumption of processed foods and high sugar beverages. Cholesterol and LDL are elevated due to patient being off her atorvastatin, which is recently resumed. Will continue to monitor. Patient's daughter conferred understanding, all questions were answered to the best of my ability.  Follow up appointment scheduled in 1/24

## 2024-01-05 ENCOUNTER — OFFICE VISIT (OUTPATIENT)
Dept: INTERNAL MEDICINE CLINIC | Facility: CLINIC | Age: 79
End: 2024-01-05
Payer: MEDICARE

## 2024-01-05 VITALS
OXYGEN SATURATION: 98 % | TEMPERATURE: 97.9 F | HEIGHT: 60 IN | HEART RATE: 74 BPM | DIASTOLIC BLOOD PRESSURE: 78 MMHG | BODY MASS INDEX: 33.38 KG/M2 | WEIGHT: 170 LBS | SYSTOLIC BLOOD PRESSURE: 130 MMHG | RESPIRATION RATE: 16 BRPM

## 2024-01-05 DIAGNOSIS — E78.2 MIXED HYPERLIPIDEMIA: Primary | ICD-10-CM

## 2024-01-05 DIAGNOSIS — I10 PRIMARY HYPERTENSION: ICD-10-CM

## 2024-01-05 PROCEDURE — 99214 OFFICE O/P EST MOD 30 MIN: CPT | Performed by: HOSPITALIST

## 2024-01-05 RX ORDER — LISINOPRIL 5 MG/1
5 TABLET ORAL DAILY
Qty: 90 TABLET | Refills: 1 | Status: SHIPPED | OUTPATIENT
Start: 2024-01-05

## 2024-01-05 RX ORDER — AMLODIPINE BESYLATE 5 MG/1
5 TABLET ORAL DAILY
Qty: 90 TABLET | Refills: 2 | Status: SHIPPED | OUTPATIENT
Start: 2024-01-05

## 2024-01-05 NOTE — PROGRESS NOTES
INTERNAL MEDICINE FOLLOW-UP OFFICE VISIT  [facility] - [facility] INTERNAL MEDICINE [place]    NAME: Ximena Toledo  AGE: 78 y.o. SEX: female  : 1945     DATE: 2024     Assessment and Plan:     1. Primary hypertension  Blood pressure under good control with lisinopril and amlodipine.  No changes made to the regimen.  - lisinopril (ZESTRIL) 5 mg tablet; Take 1 tablet (5 mg total) by mouth daily  Dispense: 90 tablet; Refill: 1  - amLODIPine (NORVASC) 5 mg tablet; Take 1 tablet (5 mg total) by mouth daily  Dispense: 90 tablet; Refill: 2    2. Mixed hyperlipidemia her last lipid profile in 2023 revealed total cholesterol of 208, triglycerides of 89, HDL cholesterol 55, LDL of 135-patient was prescribed atorvastatin however she wishes to hold off on starting statins currently and has started on modified regiment including more salmon in her diet-will repeat a lipid profile in 6 months to see if that this is working.  I am okay with her holding off starting statins for now.    Suspected hypovitaminosis D--given that she is an indoor person would recommend starting 1000 to 2000 units of vitamin D daily.      Has taken the flu shot    Otherwise up-to-date on all preventive measures    Repeat lipid profile in 6 months time  Plan of care discussed at length with patient as well as with patient's daughter who acted as      Chief Complaint:     Chief Complaint   Patient presents with   • Follow-up     4 week f/u        History of Present Illness:     Ximena Huff is here along with her daughter for follow-up.  She has no complaints.  She has modified her diet and is more on fish based diet.  Is exercising more.  However is an indoor person.  Denies any chest pain, palpitations or shortness of breath.  She wants to hold off on starting statins and see whether her modified diet helps in reducing her cholesterol.  Blood sugars are well-controlled and blood pressure is within  normal limits as well.   The following portions of the patient's history were reviewed and updated as appropriate: allergies, current medications, past family history, past medical history, past social history, past surgical history and problem list.     Review of Systems:     Review of Systems All other review of symptoms negative unless specified otherwise     Problem List:     Patient Active Problem List   Diagnosis   • Weakness of right hand   • Intracranial aneurysm, incidental 4 mm right supraclinoid ICA aneurysm   • Hyperlipidemia   • Primary hypertension   • Lacunar infarction (HCC)   • Urge incontinence        Objective:     /78 (BP Location: Left arm, Patient Position: Sitting, Cuff Size: Adult)   Pulse 74   Temp 97.9 °F (36.6 °C) (Tympanic)   Resp 16   Ht 5' (1.524 m)   Wt 77.1 kg (170 lb)   SpO2 98%   BMI 33.20 kg/m²     Physical Exam  General Appearance:    Alert, cooperative, no distress   Head:    Normocephalic, without obvious abnormality, atraumatic   Eyes:    PERRL, conjunctiva/corneas clear, EOM's intact, fundi     benign, both eyes        Neck:   Supple, no adenopathy, no JVD   Back:     Symmetric, no curvature, ROM normal, no CVA tenderness   Lungs:     Clear to auscultation bilaterally, no wheezing or rhonchi   Heart:    Regular rate and rhythm, S1 and S2 normal, no murmur, rub   or gallop   Abdomen:     Soft, non-tender, bowel sounds active    Extremities:   Extremities normal, atraumatic, no cyanosis or edema   Psych:   Normal Affect   Neurologic:   CNII-XII intact. Normal strength, sensation and reflexes       Throughout       Pertinent Laboratory/Diagnostic Studies:    Laboratory Results: I have personally reviewed the pertinent laboratory results/reports             Fabrice Jewell MD  [facility] INTERNAL MEDICINE [place]

## 2024-07-29 DIAGNOSIS — I10 PRIMARY HYPERTENSION: ICD-10-CM

## 2024-07-29 RX ORDER — LISINOPRIL 5 MG/1
5 TABLET ORAL DAILY
Qty: 100 TABLET | Refills: 1 | Status: SHIPPED | OUTPATIENT
Start: 2024-07-29

## 2024-07-29 NOTE — TELEPHONE ENCOUNTER
Reason for call:   [x] Refill   [] Prior Auth  [] Other:     Office:   [x] PCP/Provider -   [] Specialty/Provider -     PATIENT IS GOING AWAY FOR 8 MONTHS TO [place] AND NEEDS A 6 MONTH SUPPLY, PATIENT IS LEAVING TOMORROW 07/30.  DAUGHTER IS ASKING FOR THEM TO BE FILLED TODAY.    LISINOPRIL 5 MG    AMLODIPINE  5 MG    PHARMACY  [address] [address]  Phone: [phone]  Fax: [phone]  YUE #: --     Does the patient have enough for 3 days?   [x] Yes   [] No - Send as HP to POD

## 2024-12-13 ENCOUNTER — TELEPHONE (OUTPATIENT)
Dept: INTERNAL MEDICINE CLINIC | Facility: CLINIC | Age: 79
End: 2024-12-13

## 2024-12-13 NOTE — TELEPHONE ENCOUNTER
I called patient to schedule her Medicare Wellness and daughter picked up phone and said [place] sometime next year. She will call then to schedule.

## 2025-04-24 ENCOUNTER — DOCUMENTATION (OUTPATIENT)
Dept: ADMINISTRATIVE | Facility: OTHER | Age: 80
End: 2025-04-24

## 2025-04-24 NOTE — PROGRESS NOTES
04/24/25 10:57 AM    CRC outreach is not required, [place]    Thank you.  Nicole Saez  [facility]

## 2025-08-08 ENCOUNTER — TELEPHONE (OUTPATIENT)
Dept: ADMINISTRATIVE | Facility: OTHER | Age: 80
End: 2025-08-08